# Patient Record
Sex: FEMALE | Race: WHITE | Employment: FULL TIME | URBAN - METROPOLITAN AREA
[De-identification: names, ages, dates, MRNs, and addresses within clinical notes are randomized per-mention and may not be internally consistent; named-entity substitution may affect disease eponyms.]

---

## 2018-09-18 ENCOUNTER — HOSPITAL ENCOUNTER (OUTPATIENT)
Dept: CT IMAGING | Age: 43
Discharge: HOME | End: 2018-09-20
Payer: COMMERCIAL

## 2018-09-18 DIAGNOSIS — R10.9 ACUTE ABDOMINAL PAIN: ICD-10-CM

## 2018-09-18 PROCEDURE — 2580000003 HC RX 258: Performed by: INTERNAL MEDICINE

## 2018-09-18 PROCEDURE — 74177 CT ABD & PELVIS W/CONTRAST: CPT

## 2018-09-18 PROCEDURE — 6360000004 HC RX CONTRAST MEDICATION: Performed by: INTERNAL MEDICINE

## 2018-09-18 RX ORDER — SODIUM CHLORIDE 0.9 % (FLUSH) 0.9 %
10 SYRINGE (ML) INJECTION
Status: COMPLETED | OUTPATIENT
Start: 2018-09-18 | End: 2018-09-18

## 2018-09-18 RX ORDER — 0.9 % SODIUM CHLORIDE 0.9 %
80 INTRAVENOUS SOLUTION INTRAVENOUS ONCE
Status: COMPLETED | OUTPATIENT
Start: 2018-09-18 | End: 2018-09-18

## 2018-09-18 RX ADMIN — IOPAMIDOL 75 ML: 755 INJECTION, SOLUTION INTRAVENOUS at 17:27

## 2018-09-18 RX ADMIN — SODIUM CHLORIDE 80 ML: 9 INJECTION, SOLUTION INTRAVENOUS at 17:26

## 2018-09-18 RX ADMIN — IOHEXOL 50 ML: 240 INJECTION, SOLUTION INTRATHECAL; INTRAVASCULAR; INTRAVENOUS; ORAL at 17:27

## 2018-09-18 RX ADMIN — Medication 10 ML: at 17:27

## 2021-08-02 ENCOUNTER — HOSPITAL ENCOUNTER (OUTPATIENT)
Dept: PREADMISSION TESTING | Age: 46
Discharge: HOME | End: 2021-08-06
Payer: COMMERCIAL

## 2021-08-02 VITALS
BODY MASS INDEX: 37.54 KG/M2 | TEMPERATURE: 98.1 F | SYSTOLIC BLOOD PRESSURE: 128 MMHG | DIASTOLIC BLOOD PRESSURE: 85 MMHG | OXYGEN SATURATION: 100 % | HEART RATE: 99 BPM | RESPIRATION RATE: 16 BRPM | HEIGHT: 68 IN | WEIGHT: 247.7 LBS

## 2021-08-02 LAB
-: ABNORMAL
ABSOLUTE EOS #: 1.51 K/UL (ref 0–0.4)
ABSOLUTE IMMATURE GRANULOCYTE: 0 K/UL (ref 0–0.3)
ABSOLUTE LYMPH #: 0.63 K/UL (ref 1–4.8)
ABSOLUTE MONO #: 0.44 K/UL (ref 0.2–0.8)
AMORPHOUS: ABNORMAL
ANION GAP SERPL CALCULATED.3IONS-SCNC: 13 MMOL/L (ref 9–17)
BACTERIA: ABNORMAL
BASOPHILS # BLD: 0 %
BASOPHILS ABSOLUTE: 0 K/UL (ref 0–0.2)
BILIRUBIN URINE: NEGATIVE
BUN BLDV-MCNC: 15 MG/DL (ref 6–20)
BUN/CREAT BLD: 18 (ref 9–20)
CALCIUM SERPL-MCNC: 9.2 MG/DL (ref 8.6–10.4)
CASTS UA: ABNORMAL /LPF
CHLORIDE BLD-SCNC: 104 MMOL/L (ref 98–107)
CO2: 20 MMOL/L (ref 20–31)
COLOR: YELLOW
COMMENT UA: ABNORMAL
CREAT SERPL-MCNC: 0.82 MG/DL (ref 0.5–0.9)
CRYSTALS, UA: ABNORMAL /HPF
DIFFERENTIAL TYPE: ABNORMAL
EOSINOPHILS RELATIVE PERCENT: 24 % (ref 1–4)
EPITHELIAL CELLS UA: ABNORMAL /HPF (ref 0–5)
GFR AFRICAN AMERICAN: >60 ML/MIN
GFR NON-AFRICAN AMERICAN: >60 ML/MIN
GFR SERPL CREATININE-BSD FRML MDRD: ABNORMAL ML/MIN/{1.73_M2}
GFR SERPL CREATININE-BSD FRML MDRD: ABNORMAL ML/MIN/{1.73_M2}
GLUCOSE BLD-MCNC: 96 MG/DL (ref 70–99)
GLUCOSE URINE: NEGATIVE
HCT VFR BLD CALC: 45.1 % (ref 36.3–47.1)
HEMOGLOBIN: 15.1 G/DL (ref 11.9–15.1)
IMMATURE GRANULOCYTES: 0 %
INR BLD: 1
KETONES, URINE: NEGATIVE
LEUKOCYTE ESTERASE, URINE: NEGATIVE
LYMPHOCYTES # BLD: 10 % (ref 24–44)
MCH RBC QN AUTO: 31.5 PG (ref 25.2–33.5)
MCHC RBC AUTO-ENTMCNC: 33.5 G/DL (ref 28.4–34.8)
MCV RBC AUTO: 94.2 FL (ref 82.6–102.9)
MONOCYTES # BLD: 7 % (ref 1–7)
MUCUS: ABNORMAL
NITRITE, URINE: NEGATIVE
NRBC AUTOMATED: 0 PER 100 WBC
OTHER OBSERVATIONS UA: ABNORMAL
PARTIAL THROMBOPLASTIN TIME: 24.6 SEC (ref 23.9–33.8)
PDW BLD-RTO: 14.1 % (ref 11.8–14.4)
PH UA: 6 (ref 5–8)
PLATELET # BLD: 251 K/UL (ref 138–453)
PLATELET ESTIMATE: ABNORMAL
PMV BLD AUTO: 9.9 FL (ref 8.1–13.5)
POTASSIUM SERPL-SCNC: 3.5 MMOL/L (ref 3.7–5.3)
PROTEIN UA: ABNORMAL
PROTHROMBIN TIME: 13.5 SEC (ref 11.5–14.2)
RBC # BLD: 4.79 M/UL (ref 3.95–5.11)
RBC # BLD: ABNORMAL 10*6/UL
RBC UA: ABNORMAL /HPF (ref 0–2)
RENAL EPITHELIAL, UA: ABNORMAL /HPF
SEG NEUTROPHILS: 59 % (ref 36–66)
SEGMENTED NEUTROPHILS ABSOLUTE COUNT: 3.72 K/UL (ref 1.8–7.7)
SODIUM BLD-SCNC: 137 MMOL/L (ref 135–144)
SPECIFIC GRAVITY UA: 1.03 (ref 1–1.03)
TRICHOMONAS: ABNORMAL
TURBIDITY: CLEAR
URINE HGB: NEGATIVE
UROBILINOGEN, URINE: ABNORMAL
WBC # BLD: 6.3 K/UL (ref 3.5–11.3)
WBC # BLD: ABNORMAL 10*3/UL
WBC UA: ABNORMAL /HPF (ref 0–5)
YEAST: ABNORMAL

## 2021-08-02 PROCEDURE — 36415 COLL VENOUS BLD VENIPUNCTURE: CPT

## 2021-08-02 PROCEDURE — 85025 COMPLETE CBC W/AUTO DIFF WBC: CPT

## 2021-08-02 PROCEDURE — 87641 MR-STAPH DNA AMP PROBE: CPT

## 2021-08-02 PROCEDURE — 81001 URINALYSIS AUTO W/SCOPE: CPT

## 2021-08-02 PROCEDURE — 85610 PROTHROMBIN TIME: CPT

## 2021-08-02 PROCEDURE — 80048 BASIC METABOLIC PNL TOTAL CA: CPT

## 2021-08-02 PROCEDURE — 85730 THROMBOPLASTIN TIME PARTIAL: CPT

## 2021-08-02 RX ORDER — VENLAFAXINE 37.5 MG/1
37.5 TABLET ORAL 2 TIMES DAILY
COMMUNITY

## 2021-08-02 RX ORDER — TRAMADOL HYDROCHLORIDE 50 MG/1
50 TABLET ORAL EVERY 6 HOURS PRN
Status: ON HOLD | COMMUNITY
End: 2021-08-24 | Stop reason: HOSPADM

## 2021-08-02 RX ORDER — KETOROLAC TROMETHAMINE 10 MG/1
10 TABLET, FILM COATED ORAL EVERY 6 HOURS PRN
COMMUNITY
End: 2021-08-02

## 2021-08-02 RX ORDER — CLINDAMYCIN PHOSPHATE 900 MG/50ML
900 INJECTION INTRAVENOUS ONCE
Status: CANCELLED | OUTPATIENT
Start: 2021-08-23

## 2021-08-02 RX ORDER — ROPINIROLE 2 MG/1
2 TABLET, FILM COATED ORAL 2 TIMES DAILY
COMMUNITY

## 2021-08-02 RX ORDER — TOPIRAMATE 25 MG/1
25 TABLET ORAL 2 TIMES DAILY
COMMUNITY

## 2021-08-02 RX ORDER — DEXTROAMPHETAMINE SACCHARATE, AMPHETAMINE ASPARTATE, DEXTROAMPHETAMINE SULFATE AND AMPHETAMINE SULFATE 7.5; 7.5; 7.5; 7.5 MG/1; MG/1; MG/1; MG/1
30 TABLET ORAL DAILY
Status: ON HOLD | COMMUNITY
End: 2021-12-22

## 2021-08-02 ASSESSMENT — PAIN DESCRIPTION - DESCRIPTORS: DESCRIPTORS: CONSTANT

## 2021-08-02 ASSESSMENT — PAIN DESCRIPTION - ORIENTATION: ORIENTATION: LOWER

## 2021-08-02 ASSESSMENT — PAIN DESCRIPTION - LOCATION: LOCATION: BACK

## 2021-08-02 ASSESSMENT — PAIN DESCRIPTION - FREQUENCY: FREQUENCY: CONTINUOUS

## 2021-08-02 ASSESSMENT — PAIN SCALES - GENERAL: PAINLEVEL_OUTOF10: 5

## 2021-08-02 ASSESSMENT — PAIN DESCRIPTION - PAIN TYPE: TYPE: CHRONIC PAIN

## 2021-08-02 NOTE — H&P
History and Physical Service   [facility] [place] - INPATIENT    HISTORY AND PHYSICAL EXAMINATION            Date of Evaluation: 8/2/2021  Patient name:  Alona Stanley  MRN:   [id number]  YOB: 1975  PCP:    QIAN Hawkins CNP    History Obtained From:     Patient, Medical records    History of Present Illness: This is Alona Stanley a 40 y.o. female who presents for a pre-admission testing appointment for an upcoming T10-T11 posterior lateral fusion and instrumentation with neuromonitoring by Dr. Stefanie Purvis scheduled on 08/23/2021 at 0730 due to thoracic spine instability. The patient's chief complaint is continuous, non-radiating, 4-8/10 low back pain that has progressively worsened over the past year. The pain is aggravated by prolonged sitting or walking. The back pain is minimally relieved by taking tramadol and by doing stretches. Pt denies numbness, tingling, and weakness. S/p thoracic spine surgery in 2017. History of multiple sclerosis, unbalanced gait, and falls. Pt states she last fell 1 year ago. Functional Capacity per pt:   1) Pt is able to walk 2 city blocks on level ground without SOB. 2) Pt is able to climb 2 flights of stairs without SOB. 3) Pt is able to walk up a hill for 1-2 city blocks without SOB.     Past Medical History:     Past Medical History:   Diagnosis Date    Balance problem     COVID-19 virus infection 02/2021    Dizziness     History of depression     Hypothyroidism     Migraines     Multiple sclerosis ([facility] [address]     Restless leg syndrome     Sleep apnea     patient does not wear CPAP        Past Surgical History:     Past Surgical History:   Procedure Laterality Date    CARPAL TUNNEL RELEASE Left 10-    CARPAL TUNNEL RELEASE Right 10-    CHOLECYSTECTOMY      HYSTERECTOMY      THORACIC SPINE SURGERY      VARICOSE VEIN SURGERY  2001        Medications Prior to Admission:     Prior to Admission medications    Medication Sig Start Date End Date Taking? Authorizing Provider   amphetamine-dextroamphetamine (ADDERALL) 30 MG tablet Take 30 mg by mouth daily. Yes Historical Provider, MD   venlafaxine (EFFEXOR) 37.5 MG tablet Take 37.5 mg by mouth 2 times daily   Yes Historical Provider, MD   topiramate (TOPAMAX) 100 MG tablet Take 100 mg by mouth 2 times daily   Yes Historical Provider, MD   rOPINIRole (REQUIP) 2 MG tablet Take 2 mg by mouth 2 times daily   Yes Historical Provider, MD   Amivantamab-vmjw 350 MG/7ML SOLN Infuse 7 mLs intravenously every 30 days   Yes Historical Provider, MD   Cladribine (MAVENCLAD, 5 TABS, PO) Take 5 tablets by mouth See Admin Instructions Patient takes 5 tabs twice yearly   Yes Historical Provider, MD   traMADol (ULTRAM) 50 MG tablet Take 50 mg by mouth every 6 hours as needed for Pain. Yes Historical Provider, MD   levothyroxine (SYNTHROID) 25 MCG tablet Take 25 mcg by mouth Daily  8/26/14   Historical Provider, MD   vitamin B-12 (CYANOCOBALAMIN) 100 MCG tablet Take 100 mcg by mouth daily     Historical Provider, MD        Allergies:     Pcn [penicillins]    Social History:     Tobacco:    reports that she has never smoked. She has never used smokeless tobacco.  Alcohol:      reports no history of alcohol use. Drug Use:  reports no history of drug use. Family History:     History reviewed. No pertinent family history. Review of Systems:     Positive and Negative as described in HPI. CONSTITUTIONAL: Fatigue. Pt takes Adderall for \"energy\". Negative for fevers, chills, sweats, and weight loss. HEENT: Pt wears glasses. Negative for hearing changes, rhinorrhea, and throat pain. RESPIRATORY: Sleep apnea. Negative for shortness of breath, cough, congestion, and wheezing. CARDIOVASCULAR: Negative for chest pain, blood clot, irregular heartbeat, and palpitations. GASTROINTESTINAL: Chronic nausea due to dizziness and medication. Pt vomits approximately once per week.  Negative for reflux, diarrhea, constipation, change in bowel habits, and abdominal pain. GENITOURINARY: Negative for difficulty of urination, burning with urination, and frequency. INTEGUMENT: Negative for rash, skin lesions, and easy bruising. HEMATOLOGIC/LYMPHATIC: Left foot edema. S/p left lower extremity varicose vein surgery in 2001. ALLERGIC/IMMUNOLOGIC: Negative for urticaria and itching. ENDOCRINE: Heat intolerance. Hypothyroidism. Negative for increase in thirst, increase in urination, and cold intolerance. MUSCULOSKELETAL: See HPI. NEUROLOGICAL: Dizziness due to multiple sclerosis. Migraines. Pt follows-up with neurology at the [facility]. Negative for lightheadedness, numbness, and tingling extremities. Pt denies history of strokes, head injuries, and seizures. BEHAVIOR/PSYCH: Treated depression. Negative for anxiety. Physical Exam:   /85   Pulse 99   Temp 98.1 °F (36.7 °C)   Resp 16   Ht 5' 8\" (1.727 m)   Wt 247 lb 11.2 oz (112.4 kg)   LMP 10/16/2005   SpO2 100%   BMI 37.66 kg/m²   Patient's last menstrual period was 10/16/2005. No obstetric history on file. No results for input(s): POCGLU in the last 72 hours. General Appearance:  Alert, well appearing, and in no acute distress. Mental status: Oriented to person, place, and time. Head: Normocephalic and atraumatic. Eye: Pt is wearing glasses. No icterus, redness, pupils equal and reactive, extraocular eye movements intact, and conjunctiva clear. Ear: Hearing grossly intact. Nose: No drainage noted. Mouth: Mucous membranes moist.  Neck: Supple and no carotid bruits noted. Lungs: Bilateral equal air entry, clear to auscultation, no wheezing, rales or rhonchi, and normal effort. Cardiovascular: Normal rate, regular rhythm, no murmur, gallop, or rub. Abdomen: Soft, nontender, nondistended, and active bowel sounds. Neurologic: Normal speech and cranial nerves II through XII grossly intact. Strength 5/5 bilaterally.   Skin: No gross lesions, rashes, bruising, or bleeding on exposed skin area. Extremities: Mild left pedal edema. No right pedal edema. Posterior tibial pulses 2+ bilaterally. No calf tenderness with palpation. Psych: Normal affect.      Investigations:      Laboratory Testing:  Recent Results (from the past 24 hour(s))   CBC Auto Differential    Collection Time: 08/02/21  2:28 PM   Result Value Ref Range    WBC 6.3 3.5 - 11.3 k/uL    RBC 4.79 3.95 - 5.11 m/uL    Hemoglobin 15.1 11.9 - 15.1 g/dL    Hematocrit 45.1 36.3 - 47.1 %    MCV 94.2 82.6 - 102.9 fL    MCH 31.5 25.2 - 33.5 pg    MCHC 33.5 28.4 - 34.8 g/dL    RDW 14.1 11.8 - 14.4 %    Platelets 842 370 - 234 k/uL    MPV 9.9 8.1 - 13.5 fL    NRBC Automated 0.0 0.0 per 100 WBC    Differential Type NOT REPORTED     Seg Neutrophils PENDING %    Lymphocytes PENDING %    Monocytes PENDING %    Eosinophils % PENDING %    Basophils PENDING %    Immature Granulocytes PENDING 0 %    Segs Absolute PENDING k/uL    Absolute Lymph # PENDING k/uL    Absolute Mono # PENDING k/uL    Absolute Eos # PENDING k/uL    Basophils Absolute PENDING 0.0 - 0.2 k/uL    Absolute Immature Granulocyte PENDING 0.00 - 0.30 k/uL    WBC Morphology NOT REPORTED     RBC Morphology NOT REPORTED     Platelet Estimate NOT REPORTED    Protime-INR    Collection Time: 08/02/21  2:28 PM   Result Value Ref Range    Protime 13.5 11.5 - 14.2 sec    INR 1.0    APTT    Collection Time: 08/02/21  2:28 PM   Result Value Ref Range    PTT 24.6 23.9 - 33.8 sec   Basic Metabolic Panel    Collection Time: 08/02/21  2:28 PM   Result Value Ref Range    Glucose 96 70 - 99 mg/dL    BUN 15 6 - 20 mg/dL    CREATININE 0.82 0.50 - 0.90 mg/dL    Bun/Cre Ratio 18 9 - 20    Calcium 9.2 8.6 - 10.4 mg/dL    Sodium 137 135 - 144 mmol/L    Potassium 3.5 (L) 3.7 - 5.3 mmol/L    Chloride 104 98 - 107 mmol/L    CO2 20 20 - 31 mmol/L    Anion Gap 13 9 - 17 mmol/L    GFR Non-African American >60 >60 mL/min    GFR African American >60 >60 mL/min    GFR Comment GFR Staging NOT REPORTED        Recent Labs     08/02/21  1428   HGB 15.1   HCT 45.1   WBC 6.3   MCV 94.2      K 3.5*      CO2 20   BUN 15   CREATININE 0.82   GLUCOSE 96   INR 1.0   PROTIME 13.5   APTT 24.6       No results for input(s): COVID19 in the last 720 hours. Diagnosis:      1.  Thoracic spine instability    Plans:     1. T10-T11 posterior lateral fusion and instrumentation with neuromonitoring       QIAN Ramirez - CNP  8/2/2021  3:00 PM

## 2021-08-02 NOTE — H&P (VIEW-ONLY)
History and Physical Service   [facility] [place] - INPATIENT    HISTORY AND PHYSICAL EXAMINATION            Date of Evaluation: 8/2/2021  Patient name:  Lalo Kent  MRN:   [id number]  YOB: 1975  PCP:    QIAN Shukla CNP    History Obtained From:     Patient, Medical records    History of Present Illness: This is Lalo Kent a 40 y.o. female who presents for a pre-admission testing appointment for an upcoming T10-T11 posterior lateral fusion and instrumentation with neuromonitoring by Dr. Ayanna Fernandez scheduled on 08/23/2021 at 0730 due to thoracic spine instability. The patient's chief complaint is continuous, non-radiating, 4-8/10 low back pain that has progressively worsened over the past year. The pain is aggravated by prolonged sitting or walking. The back pain is minimally relieved by taking tramadol and by doing stretches. Pt denies numbness, tingling, and weakness. S/p thoracic spine surgery in 2017. History of multiple sclerosis, unbalanced gait, and falls. Pt states she last fell 1 year ago. Functional Capacity per pt:   1) Pt is able to walk 2 city blocks on level ground without SOB. 2) Pt is able to climb 2 flights of stairs without SOB. 3) Pt is able to walk up a hill for 1-2 city blocks without SOB.     Past Medical History:     Past Medical History:   Diagnosis Date    Balance problem     COVID-19 virus infection 02/2021    Dizziness     History of depression     Hypothyroidism     Migraines     Multiple sclerosis ([facility] [address]     Restless leg syndrome     Sleep apnea     patient does not wear CPAP        Past Surgical History:     Past Surgical History:   Procedure Laterality Date    CARPAL TUNNEL RELEASE Left 10-    CARPAL TUNNEL RELEASE Right 10-    CHOLECYSTECTOMY      HYSTERECTOMY      THORACIC SPINE SURGERY      VARICOSE VEIN SURGERY  2001        Medications Prior to Admission:     Prior to Admission medications    Medication Sig Start Date End Date Taking? Authorizing Provider   amphetamine-dextroamphetamine (ADDERALL) 30 MG tablet Take 30 mg by mouth daily. Yes Historical Provider, MD   venlafaxine (EFFEXOR) 37.5 MG tablet Take 37.5 mg by mouth 2 times daily   Yes Historical Provider, MD   topiramate (TOPAMAX) 100 MG tablet Take 100 mg by mouth 2 times daily   Yes Historical Provider, MD   rOPINIRole (REQUIP) 2 MG tablet Take 2 mg by mouth 2 times daily   Yes Historical Provider, MD   Amivantamab-vmjw 350 MG/7ML SOLN Infuse 7 mLs intravenously every 30 days   Yes Historical Provider, MD   Cladribine (MAVENCLAD, 5 TABS, PO) Take 5 tablets by mouth See Admin Instructions Patient takes 5 tabs twice yearly   Yes Historical Provider, MD   traMADol (ULTRAM) 50 MG tablet Take 50 mg by mouth every 6 hours as needed for Pain. Yes Historical Provider, MD   levothyroxine (SYNTHROID) 25 MCG tablet Take 25 mcg by mouth Daily  8/26/14   Historical Provider, MD   vitamin B-12 (CYANOCOBALAMIN) 100 MCG tablet Take 100 mcg by mouth daily     Historical Provider, MD        Allergies:     Pcn [penicillins]    Social History:     Tobacco:    reports that she has never smoked. She has never used smokeless tobacco.  Alcohol:      reports no history of alcohol use. Drug Use:  reports no history of drug use. Family History:     History reviewed. No pertinent family history. Review of Systems:     Positive and Negative as described in HPI. CONSTITUTIONAL: Fatigue. Pt takes Adderall for \"energy\". Negative for fevers, chills, sweats, and weight loss. HEENT: Pt wears glasses. Negative for hearing changes, rhinorrhea, and throat pain. RESPIRATORY: Sleep apnea. Negative for shortness of breath, cough, congestion, and wheezing. CARDIOVASCULAR: Negative for chest pain, blood clot, irregular heartbeat, and palpitations. GASTROINTESTINAL: Chronic nausea due to dizziness and medication. Pt vomits approximately once per week.  Negative for reflux, diarrhea, constipation, change in bowel habits, and abdominal pain. GENITOURINARY: Negative for difficulty of urination, burning with urination, and frequency. INTEGUMENT: Negative for rash, skin lesions, and easy bruising. HEMATOLOGIC/LYMPHATIC: Left foot edema. S/p left lower extremity varicose vein surgery in 2001. ALLERGIC/IMMUNOLOGIC: Negative for urticaria and itching. ENDOCRINE: Heat intolerance. Hypothyroidism. Negative for increase in thirst, increase in urination, and cold intolerance. MUSCULOSKELETAL: See HPI. NEUROLOGICAL: Dizziness due to multiple sclerosis. Migraines. Pt follows-up with [facility]y at the [facility]. Negative for lightheadedness, numbness, and tingling extremities. Pt denies history of strokes, head injuries, and seizures. BEHAVIOR/PSYCH: Treated depression. Negative for anxiety. Physical Exam:   /85   Pulse 99   Temp 98.1 °F (36.7 °C)   Resp 16   Ht 5' 8\" (1.727 m)   Wt 247 lb 11.2 oz (112.4 kg)   LMP 10/16/2005   SpO2 100%   BMI 37.66 kg/m²   Patient's last menstrual period was 10/16/2005. No obstetric history on file. No results for input(s): POCGLU in the last 72 hours. General Appearance:  Alert, well appearing, and in no acute distress. Mental status: Oriented to person, place, and time. Head: Normocephalic and atraumatic. Eye: Pt is wearing glasses. No icterus, redness, pupils equal and reactive, extraocular eye movements intact, and conjunctiva clear. Ear: Hearing grossly intact. Nose: No drainage noted. Mouth: Mucous membranes moist.  Neck: Supple and no carotid bruits noted. Lungs: Bilateral equal air entry, clear to auscultation, no wheezing, rales or rhonchi, and normal effort. Cardiovascular: Normal rate, regular rhythm, no murmur, gallop, or rub. Abdomen: Soft, nontender, nondistended, and active bowel sounds. Neurologic: Normal speech and cranial nerves II through XII grossly intact. Strength 5/5 bilaterally.   Skin: No gross lesions, rashes, bruising, or bleeding on exposed skin area. Extremities: Mild left pedal edema. No right pedal edema. Posterior tibial pulses 2+ bilaterally. No calf tenderness with palpation. Psych: Normal affect.      Investigations:      Laboratory Testing:  Recent Results (from the past 24 hour(s))   CBC Auto Differential    Collection Time: 08/02/21  2:28 PM   Result Value Ref Range    WBC 6.3 3.5 - 11.3 k/uL    RBC 4.79 3.95 - 5.11 m/uL    Hemoglobin 15.1 11.9 - 15.1 g/dL    Hematocrit 45.1 36.3 - 47.1 %    MCV 94.2 82.6 - 102.9 fL    MCH 31.5 25.2 - 33.5 pg    MCHC 33.5 28.4 - 34.8 g/dL    RDW 14.1 11.8 - 14.4 %    Platelets 273 376 - 559 k/uL    MPV 9.9 8.1 - 13.5 fL    NRBC Automated 0.0 0.0 per 100 WBC    Differential Type NOT REPORTED     Seg Neutrophils PENDING %    Lymphocytes PENDING %    Monocytes PENDING %    Eosinophils % PENDING %    Basophils PENDING %    Immature Granulocytes PENDING 0 %    Segs Absolute PENDING k/uL    Absolute Lymph # PENDING k/uL    Absolute Mono # PENDING k/uL    Absolute Eos # PENDING k/uL    Basophils Absolute PENDING 0.0 - 0.2 k/uL    Absolute Immature Granulocyte PENDING 0.00 - 0.30 k/uL    WBC Morphology NOT REPORTED     RBC Morphology NOT REPORTED     Platelet Estimate NOT REPORTED    Protime-INR    Collection Time: 08/02/21  2:28 PM   Result Value Ref Range    Protime 13.5 11.5 - 14.2 sec    INR 1.0    APTT    Collection Time: 08/02/21  2:28 PM   Result Value Ref Range    PTT 24.6 23.9 - 33.8 sec   Basic Metabolic Panel    Collection Time: 08/02/21  2:28 PM   Result Value Ref Range    Glucose 96 70 - 99 mg/dL    BUN 15 6 - 20 mg/dL    CREATININE 0.82 0.50 - 0.90 mg/dL    Bun/Cre Ratio 18 9 - 20    Calcium 9.2 8.6 - 10.4 mg/dL    Sodium 137 135 - 144 mmol/L    Potassium 3.5 (L) 3.7 - 5.3 mmol/L    Chloride 104 98 - 107 mmol/L    CO2 20 20 - 31 mmol/L    Anion Gap 13 9 - 17 mmol/L    GFR Non-African American >60 >60 mL/min    GFR African American >60 >60 mL/min    GFR Comment GFR Staging NOT REPORTED        Recent Labs     08/02/21  1428   HGB 15.1   HCT 45.1   WBC 6.3   MCV 94.2      K 3.5*      CO2 20   BUN 15   CREATININE 0.82   GLUCOSE 96   INR 1.0   PROTIME 13.5   APTT 24.6       No results for input(s): COVID19 in the last 720 hours. Diagnosis:      1.  Thoracic spine instability    Plans:     1. T10-T11 posterior lateral fusion and instrumentation with neuromonitoring       QIAN Whyte - CNP  8/2/2021  3:00 PM

## 2021-08-02 NOTE — PROGRESS NOTES
ARRIVE AT [facility] ON Monday, 8/23 at 0545 AM    Once you enter the hospital lobby, take the elevators to the second floor. Check-In is at the surgery registration desk. Continue to take your home medications as you normally do up to and including the night before surgery with the exception of any blood thinning medications. Please stop any blood thinning medications as directed by your surgeon or prescribing physician. Failure to stop certain medications may interfere with your scheduled surgery. These may include:  Aspirin, Warfarin (Coumadin), Clopidogrel (Plavix), Ibuprofen (Motrin, Advil), Naproxen (Aleve), Meloxicam (Mobic), Celecoxib (Celebrex), Eliquis, Pradaxa, Xarelto, Effient, Fish Oil, Herbal supplements. Please take the following medication(s) the day of surgery with a small sip of water:  Synthroid and Venlafaxine      PREPARING FOR YOUR SURGERY:     Before surgery, you can play an important role in your own health. Because skin is not sterile, we need to be sure that your skin is as free of germs as possible before surgery by carefully washing before surgery. Preparing or prepping skin before surgery can reduce the risk of a surgical site infection.   Do not shave the area of your body where your surgery will be performed unless you received specific permission from your physician. You will need to shower at home the night before surgery and the morning of surgery with a special soap called chlorhexidine gluconate (CHG*). *Not to be used by people allergic to Chlorhexidine Gluconate (CHG). Following these instructions will help you be sure that your skin is clean before surgery. Instructions on cleaning your skin before surgery: The night before your surgery:      You will need to shower with warm water (not hot) and the CHG soap.  Use a clean wash cloth and a clean towel. Have clean clothes available to put on after the shower.         First wash the day of surgery. No nail polish on the operative extremity (arm/leg surgeries)    · Do not bring any valuables such as jewelry, cash, or credit cards. If you are staying overnight with us, please bring a small bag of personal items.  Please wear loose, comfortable clothing. If you are potentially going to have a cast or brace bring clothing that will fit over them.  In case of illness - If you have cold or flu like symptoms (high fever, runny nose, sore throat, cough, etc.) rash, nausea, vomiting, loose stools, and/or recent contact with someone who has a contagious disease (chicken pox, measles, etc.) Please call your doctor before coming to the hospital.         Day of Surgery/Procedure:    As a patient at [facility] you can expect quality medical and nursing care that is centered on your individual needs. Our goal is to make your surgical experience as comfortable as possible    . Transportation After Your Surgery/Procedure: You will need a friend or family member to drive you home after your procedure. Your  must be 25years of age or older and able to sign off on your discharge instructions. A taxi cab or any other form of public transportation is not acceptable. Your friend or family member must stay at the hospital throughout your procedure. Someone must remain with you for the first 24 hours after your surgery if you receive anesthesia or medication. If you do not have someone to stay with you, your procedure may be cancelled.       If you have any other questions regarding your procedure or the day of surgery, please call [phone]      _________________________  ____________________________  Signature (Patient)              Signature (Provider) & date

## 2021-08-03 LAB
MRSA, DNA, NASAL: NORMAL
SPECIMEN DESCRIPTION: NORMAL

## 2021-08-20 ENCOUNTER — ANESTHESIA EVENT (OUTPATIENT)
Dept: OPERATING ROOM | Age: 46
DRG: 460 | End: 2021-08-20
Payer: COMMERCIAL

## 2021-08-23 ENCOUNTER — ANESTHESIA (OUTPATIENT)
Dept: OPERATING ROOM | Age: 46
DRG: 460 | End: 2021-08-23
Payer: COMMERCIAL

## 2021-08-23 ENCOUNTER — APPOINTMENT (OUTPATIENT)
Dept: GENERAL RADIOLOGY | Age: 46
DRG: 460 | End: 2021-08-23
Attending: ORTHOPAEDIC SURGERY
Payer: COMMERCIAL

## 2021-08-23 ENCOUNTER — HOSPITAL ENCOUNTER (INPATIENT)
Age: 46
LOS: 1 days | Discharge: HOME | DRG: 460 | End: 2021-08-24
Attending: ORTHOPAEDIC SURGERY | Admitting: ORTHOPAEDIC SURGERY
Payer: COMMERCIAL

## 2021-08-23 VITALS — SYSTOLIC BLOOD PRESSURE: 98 MMHG | TEMPERATURE: 97 F | DIASTOLIC BLOOD PRESSURE: 55 MMHG | OXYGEN SATURATION: 92 %

## 2021-08-23 DIAGNOSIS — G89.29 CHRONIC BACK PAIN GREATER THAN 3 MONTHS DURATION: ICD-10-CM

## 2021-08-23 DIAGNOSIS — M53.2X4 SPINAL INSTABILITY OF THORACIC REGION: Primary | ICD-10-CM

## 2021-08-23 DIAGNOSIS — M54.9 CHRONIC BACK PAIN GREATER THAN 3 MONTHS DURATION: ICD-10-CM

## 2021-08-23 PROCEDURE — 2580000003 HC RX 258: Performed by: ANESTHESIOLOGY

## 2021-08-23 PROCEDURE — 2500000003 HC RX 250 WO HCPCS: Performed by: NURSE ANESTHETIST, CERTIFIED REGISTERED

## 2021-08-23 PROCEDURE — 2500000003 HC RX 250 WO HCPCS: Performed by: ORTHOPAEDIC SURGERY

## 2021-08-23 PROCEDURE — 2720000010 HC SURG SUPPLY STERILE: Performed by: ORTHOPAEDIC SURGERY

## 2021-08-23 PROCEDURE — 2709999900 HC NON-CHARGEABLE SUPPLY: Performed by: ORTHOPAEDIC SURGERY

## 2021-08-23 PROCEDURE — 6360000002 HC RX W HCPCS: Performed by: ORTHOPAEDIC SURGERY

## 2021-08-23 PROCEDURE — 3600000004 HC SURGERY LEVEL 4 BASE: Performed by: ORTHOPAEDIC SURGERY

## 2021-08-23 PROCEDURE — 2580000003 HC RX 258: Performed by: ORTHOPAEDIC SURGERY

## 2021-08-23 PROCEDURE — 97163 PT EVAL HIGH COMPLEX 45 MIN: CPT

## 2021-08-23 PROCEDURE — 6370000000 HC RX 637 (ALT 250 FOR IP): Performed by: ORTHOPAEDIC SURGERY

## 2021-08-23 PROCEDURE — 3209999900 FLUORO FOR SURGICAL PROCEDURES

## 2021-08-23 PROCEDURE — 6360000002 HC RX W HCPCS: Performed by: NURSE ANESTHETIST, CERTIFIED REGISTERED

## 2021-08-23 PROCEDURE — C1713 ANCHOR/SCREW BN/BN,TIS/BN: HCPCS | Performed by: ORTHOPAEDIC SURGERY

## 2021-08-23 PROCEDURE — 2780000010 HC IMPLANT OTHER: Performed by: ORTHOPAEDIC SURGERY

## 2021-08-23 PROCEDURE — 7100000001 HC PACU RECOVERY - ADDTL 15 MIN: Performed by: ORTHOPAEDIC SURGERY

## 2021-08-23 PROCEDURE — C9359 IMPLNT,BON VOID FILLER-PUTTY: HCPCS | Performed by: ORTHOPAEDIC SURGERY

## 2021-08-23 PROCEDURE — 1200000000 HC SEMI PRIVATE

## 2021-08-23 PROCEDURE — 0RG6071 FUSION OF THORACIC VERTEBRAL JOINT WITH AUTOLOGOUS TISSUE SUBSTITUTE, POSTERIOR APPROACH, POSTERIOR COLUMN, OPEN APPROACH: ICD-10-PCS | Performed by: ORTHOPAEDIC SURGERY

## 2021-08-23 PROCEDURE — 97116 GAIT TRAINING THERAPY: CPT

## 2021-08-23 PROCEDURE — 6370000000 HC RX 637 (ALT 250 FOR IP): Performed by: ANESTHESIOLOGY

## 2021-08-23 PROCEDURE — 3700000001 HC ADD 15 MINUTES (ANESTHESIA): Performed by: ORTHOPAEDIC SURGERY

## 2021-08-23 PROCEDURE — 6360000002 HC RX W HCPCS: Performed by: ANESTHESIOLOGY

## 2021-08-23 PROCEDURE — 7100000000 HC PACU RECOVERY - FIRST 15 MIN: Performed by: ORTHOPAEDIC SURGERY

## 2021-08-23 PROCEDURE — 87086 URINE CULTURE/COLONY COUNT: CPT

## 2021-08-23 PROCEDURE — 3700000000 HC ANESTHESIA ATTENDED CARE: Performed by: ORTHOPAEDIC SURGERY

## 2021-08-23 PROCEDURE — 3600000014 HC SURGERY LEVEL 4 ADDTL 15MIN: Performed by: ORTHOPAEDIC SURGERY

## 2021-08-23 PROCEDURE — 97530 THERAPEUTIC ACTIVITIES: CPT

## 2021-08-23 DEVICE — SCREW SPNL DIA5.5MM OPN TULIP LOK RELINE: Type: IMPLANTABLE DEVICE | Site: BACK | Status: FUNCTIONAL

## 2021-08-23 DEVICE — DBM 005005 PROGENIX DBM 5 CC SRVC FEE
Type: IMPLANTABLE DEVICE | Site: BACK | Status: FUNCTIONAL
Brand: PROGENIX® PUTTY AND PROGENIX® PLUS

## 2021-08-23 DEVICE — SCREW SPNL MOD TULIP RELINE: Type: IMPLANTABLE DEVICE | Site: BACK | Status: FUNCTIONAL

## 2021-08-23 DEVICE — IMPLANTABLE DEVICE: Type: IMPLANTABLE DEVICE | Site: BACK | Status: FUNCTIONAL

## 2021-08-23 DEVICE — ROD SPNL L40MM DIA5.5MM POST THORACOLUMBOSACRAL TI LORD: Type: IMPLANTABLE DEVICE | Site: BACK | Status: FUNCTIONAL

## 2021-08-23 DEVICE — GRAFT BONE SUB 30CC L1-10MM CANC CRUSH FRZ DRY: Type: IMPLANTABLE DEVICE | Site: BACK | Status: FUNCTIONAL

## 2021-08-23 RX ORDER — SODIUM CHLORIDE 0.9 % (FLUSH) 0.9 %
10 SYRINGE (ML) INJECTION PRN
Status: DISCONTINUED | OUTPATIENT
Start: 2021-08-23 | End: 2021-08-23 | Stop reason: HOSPADM

## 2021-08-23 RX ORDER — FENTANYL CITRATE 50 UG/ML
25 INJECTION, SOLUTION INTRAMUSCULAR; INTRAVENOUS EVERY 5 MIN PRN
Status: DISCONTINUED | OUTPATIENT
Start: 2021-08-23 | End: 2021-08-23 | Stop reason: HOSPADM

## 2021-08-23 RX ORDER — BUPIVACAINE HYDROCHLORIDE AND EPINEPHRINE 5; 5 MG/ML; UG/ML
INJECTION, SOLUTION EPIDURAL; INTRACAUDAL; PERINEURAL PRN
Status: DISCONTINUED | OUTPATIENT
Start: 2021-08-23 | End: 2021-08-23 | Stop reason: HOSPADM

## 2021-08-23 RX ORDER — TOPIRAMATE 100 MG/1
100 TABLET, FILM COATED ORAL 2 TIMES DAILY
Status: DISCONTINUED | OUTPATIENT
Start: 2021-08-23 | End: 2021-08-24 | Stop reason: HOSPADM

## 2021-08-23 RX ORDER — HYDROMORPHONE HYDROCHLORIDE 1 MG/ML
0.25 INJECTION, SOLUTION INTRAMUSCULAR; INTRAVENOUS; SUBCUTANEOUS EVERY 5 MIN PRN
Status: COMPLETED | OUTPATIENT
Start: 2021-08-23 | End: 2021-08-23

## 2021-08-23 RX ORDER — MIDAZOLAM HYDROCHLORIDE 1 MG/ML
INJECTION INTRAMUSCULAR; INTRAVENOUS PRN
Status: DISCONTINUED | OUTPATIENT
Start: 2021-08-23 | End: 2021-08-23 | Stop reason: SDUPTHER

## 2021-08-23 RX ORDER — SENNA AND DOCUSATE SODIUM 50; 8.6 MG/1; MG/1
1 TABLET, FILM COATED ORAL 2 TIMES DAILY
Status: DISCONTINUED | OUTPATIENT
Start: 2021-08-23 | End: 2021-08-24 | Stop reason: HOSPADM

## 2021-08-23 RX ORDER — TIZANIDINE 4 MG/1
4 TABLET ORAL ONCE
Status: COMPLETED | OUTPATIENT
Start: 2021-08-23 | End: 2021-08-23

## 2021-08-23 RX ORDER — SODIUM CHLORIDE 0.9 % (FLUSH) 0.9 %
10 SYRINGE (ML) INJECTION EVERY 12 HOURS SCHEDULED
Status: DISCONTINUED | OUTPATIENT
Start: 2021-08-23 | End: 2021-08-24 | Stop reason: HOSPADM

## 2021-08-23 RX ORDER — ONDANSETRON 2 MG/ML
INJECTION INTRAMUSCULAR; INTRAVENOUS PRN
Status: DISCONTINUED | OUTPATIENT
Start: 2021-08-23 | End: 2021-08-23 | Stop reason: SDUPTHER

## 2021-08-23 RX ORDER — LIDOCAINE HYDROCHLORIDE 20 MG/ML
INJECTION, SOLUTION EPIDURAL; INFILTRATION; INTRACAUDAL; PERINEURAL PRN
Status: DISCONTINUED | OUTPATIENT
Start: 2021-08-23 | End: 2021-08-23 | Stop reason: SDUPTHER

## 2021-08-23 RX ORDER — SODIUM CHLORIDE, SODIUM LACTATE, POTASSIUM CHLORIDE, CALCIUM CHLORIDE 600; 310; 30; 20 MG/100ML; MG/100ML; MG/100ML; MG/100ML
INJECTION, SOLUTION INTRAVENOUS CONTINUOUS
Status: DISCONTINUED | OUTPATIENT
Start: 2021-08-24 | End: 2021-08-23

## 2021-08-23 RX ORDER — SODIUM CHLORIDE 0.9 % (FLUSH) 0.9 %
10 SYRINGE (ML) INJECTION EVERY 12 HOURS SCHEDULED
Status: DISCONTINUED | OUTPATIENT
Start: 2021-08-23 | End: 2021-08-23 | Stop reason: HOSPADM

## 2021-08-23 RX ORDER — OXYCODONE HCL 10 MG/1
10 TABLET, FILM COATED, EXTENDED RELEASE ORAL EVERY 12 HOURS SCHEDULED
Status: DISCONTINUED | OUTPATIENT
Start: 2021-08-23 | End: 2021-08-24 | Stop reason: HOSPADM

## 2021-08-23 RX ORDER — SODIUM CHLORIDE 9 MG/ML
INJECTION, SOLUTION INTRAVENOUS CONTINUOUS
Status: DISCONTINUED | OUTPATIENT
Start: 2021-08-24 | End: 2021-08-23

## 2021-08-23 RX ORDER — LEVOTHYROXINE SODIUM 0.03 MG/1
25 TABLET ORAL DAILY
Status: DISCONTINUED | OUTPATIENT
Start: 2021-08-24 | End: 2021-08-24 | Stop reason: HOSPADM

## 2021-08-23 RX ORDER — TIZANIDINE 4 MG/1
4 TABLET ORAL EVERY 8 HOURS PRN
Status: DISCONTINUED | OUTPATIENT
Start: 2021-08-23 | End: 2021-08-24 | Stop reason: HOSPADM

## 2021-08-23 RX ORDER — ONDANSETRON 2 MG/ML
4 INJECTION INTRAMUSCULAR; INTRAVENOUS EVERY 6 HOURS PRN
Status: DISCONTINUED | OUTPATIENT
Start: 2021-08-23 | End: 2021-08-24 | Stop reason: HOSPADM

## 2021-08-23 RX ORDER — CLINDAMYCIN PHOSPHATE 900 MG/50ML
900 INJECTION INTRAVENOUS ONCE
Status: COMPLETED | OUTPATIENT
Start: 2021-08-23 | End: 2021-08-23

## 2021-08-23 RX ORDER — VANCOMYCIN HYDROCHLORIDE 1 G/20ML
INJECTION, POWDER, LYOPHILIZED, FOR SOLUTION INTRAVENOUS PRN
Status: DISCONTINUED | OUTPATIENT
Start: 2021-08-23 | End: 2021-08-23 | Stop reason: HOSPADM

## 2021-08-23 RX ORDER — POLYETHYLENE GLYCOL 3350 17 G/17G
17 POWDER, FOR SOLUTION ORAL DAILY
Status: DISCONTINUED | OUTPATIENT
Start: 2021-08-23 | End: 2021-08-24 | Stop reason: HOSPADM

## 2021-08-23 RX ORDER — VENLAFAXINE 37.5 MG/1
37.5 TABLET ORAL 2 TIMES DAILY
Status: DISCONTINUED | OUTPATIENT
Start: 2021-08-23 | End: 2021-08-24 | Stop reason: HOSPADM

## 2021-08-23 RX ORDER — SODIUM CHLORIDE 9 MG/ML
INJECTION, SOLUTION INTRAVENOUS CONTINUOUS
Status: DISCONTINUED | OUTPATIENT
Start: 2021-08-23 | End: 2021-08-24 | Stop reason: HOSPADM

## 2021-08-23 RX ORDER — PROMETHAZINE HYDROCHLORIDE 12.5 MG/1
12.5 TABLET ORAL EVERY 6 HOURS PRN
Status: DISCONTINUED | OUTPATIENT
Start: 2021-08-23 | End: 2021-08-24 | Stop reason: HOSPADM

## 2021-08-23 RX ORDER — PHENYLEPHRINE HCL IN 0.9% NACL 1 MG/10 ML
SYRINGE (ML) INTRAVENOUS PRN
Status: DISCONTINUED | OUTPATIENT
Start: 2021-08-23 | End: 2021-08-23 | Stop reason: SDUPTHER

## 2021-08-23 RX ORDER — KETAMINE HCL IN NACL, ISO-OSM 100MG/10ML
SYRINGE (ML) INJECTION PRN
Status: DISCONTINUED | OUTPATIENT
Start: 2021-08-23 | End: 2021-08-23 | Stop reason: SDUPTHER

## 2021-08-23 RX ORDER — ONDANSETRON 2 MG/ML
4 INJECTION INTRAMUSCULAR; INTRAVENOUS
Status: DISCONTINUED | OUTPATIENT
Start: 2021-08-23 | End: 2021-08-23 | Stop reason: HOSPADM

## 2021-08-23 RX ORDER — SODIUM CHLORIDE 9 MG/ML
25 INJECTION, SOLUTION INTRAVENOUS PRN
Status: DISCONTINUED | OUTPATIENT
Start: 2021-08-23 | End: 2021-08-23 | Stop reason: HOSPADM

## 2021-08-23 RX ORDER — SUCCINYLCHOLINE/SOD CL,ISO/PF 100 MG/5ML
SYRINGE (ML) INTRAVENOUS PRN
Status: DISCONTINUED | OUTPATIENT
Start: 2021-08-23 | End: 2021-08-23 | Stop reason: SDUPTHER

## 2021-08-23 RX ORDER — PROPOFOL 10 MG/ML
INJECTION, EMULSION INTRAVENOUS CONTINUOUS PRN
Status: DISCONTINUED | OUTPATIENT
Start: 2021-08-23 | End: 2021-08-23 | Stop reason: SDUPTHER

## 2021-08-23 RX ORDER — LIDOCAINE HYDROCHLORIDE 10 MG/ML
1 INJECTION, SOLUTION EPIDURAL; INFILTRATION; INTRACAUDAL; PERINEURAL
Status: DISCONTINUED | OUTPATIENT
Start: 2021-08-24 | End: 2021-08-23 | Stop reason: HOSPADM

## 2021-08-23 RX ORDER — DEXAMETHASONE SODIUM PHOSPHATE 10 MG/ML
6 INJECTION, SOLUTION INTRAMUSCULAR; INTRAVENOUS EVERY 8 HOURS
Status: COMPLETED | OUTPATIENT
Start: 2021-08-23 | End: 2021-08-24

## 2021-08-23 RX ORDER — FENTANYL CITRATE 50 UG/ML
INJECTION, SOLUTION INTRAMUSCULAR; INTRAVENOUS PRN
Status: DISCONTINUED | OUTPATIENT
Start: 2021-08-23 | End: 2021-08-23 | Stop reason: SDUPTHER

## 2021-08-23 RX ORDER — DEXAMETHASONE SODIUM PHOSPHATE 10 MG/ML
INJECTION, SOLUTION INTRAMUSCULAR; INTRAVENOUS PRN
Status: DISCONTINUED | OUTPATIENT
Start: 2021-08-23 | End: 2021-08-23 | Stop reason: SDUPTHER

## 2021-08-23 RX ORDER — ROPINIROLE 1 MG/1
2 TABLET, FILM COATED ORAL 2 TIMES DAILY
Status: DISCONTINUED | OUTPATIENT
Start: 2021-08-23 | End: 2021-08-24 | Stop reason: HOSPADM

## 2021-08-23 RX ORDER — SODIUM CHLORIDE 0.9 % (FLUSH) 0.9 %
10 SYRINGE (ML) INJECTION PRN
Status: DISCONTINUED | OUTPATIENT
Start: 2021-08-23 | End: 2021-08-24 | Stop reason: HOSPADM

## 2021-08-23 RX ORDER — CLINDAMYCIN PHOSPHATE 900 MG/50ML
900 INJECTION INTRAVENOUS EVERY 8 HOURS
Status: COMPLETED | OUTPATIENT
Start: 2021-08-23 | End: 2021-08-24

## 2021-08-23 RX ORDER — DEXTROAMPHETAMINE SACCHARATE, AMPHETAMINE ASPARTATE MONOHYDRATE, DEXTROAMPHETAMINE SULFATE AND AMPHETAMINE SULFATE 7.5; 7.5; 7.5; 7.5 MG/1; MG/1; MG/1; MG/1
30 CAPSULE, EXTENDED RELEASE ORAL DAILY
Status: DISCONTINUED | OUTPATIENT
Start: 2021-08-23 | End: 2021-08-24 | Stop reason: HOSPADM

## 2021-08-23 RX ORDER — OXYCODONE HYDROCHLORIDE AND ACETAMINOPHEN 5; 325 MG/1; MG/1
2 TABLET ORAL EVERY 4 HOURS PRN
Status: DISCONTINUED | OUTPATIENT
Start: 2021-08-23 | End: 2021-08-24 | Stop reason: HOSPADM

## 2021-08-23 RX ORDER — OXYCODONE HYDROCHLORIDE AND ACETAMINOPHEN 5; 325 MG/1; MG/1
1 TABLET ORAL EVERY 4 HOURS PRN
Status: DISCONTINUED | OUTPATIENT
Start: 2021-08-23 | End: 2021-08-24 | Stop reason: HOSPADM

## 2021-08-23 RX ORDER — SCOLOPAMINE TRANSDERMAL SYSTEM 1 MG/1
1 PATCH, EXTENDED RELEASE TRANSDERMAL ONCE
Status: DISCONTINUED | OUTPATIENT
Start: 2021-08-23 | End: 2021-08-23

## 2021-08-23 RX ORDER — SODIUM CHLORIDE 9 MG/ML
25 INJECTION, SOLUTION INTRAVENOUS PRN
Status: DISCONTINUED | OUTPATIENT
Start: 2021-08-23 | End: 2021-08-24 | Stop reason: HOSPADM

## 2021-08-23 RX ORDER — HYDROMORPHONE HYDROCHLORIDE 1 MG/ML
0.25 INJECTION, SOLUTION INTRAMUSCULAR; INTRAVENOUS; SUBCUTANEOUS EVERY 5 MIN PRN
Status: DISCONTINUED | OUTPATIENT
Start: 2021-08-23 | End: 2021-08-23 | Stop reason: HOSPADM

## 2021-08-23 RX ADMIN — PROPOFOL 200 MG: 10 INJECTION, EMULSION INTRAVENOUS at 07:49

## 2021-08-23 RX ADMIN — ROPINIROLE HYDROCHLORIDE 2 MG: 1 TABLET, FILM COATED ORAL at 20:38

## 2021-08-23 RX ADMIN — SODIUM CHLORIDE, POTASSIUM CHLORIDE, SODIUM LACTATE AND CALCIUM CHLORIDE: 600; 310; 30; 20 INJECTION, SOLUTION INTRAVENOUS at 06:24

## 2021-08-23 RX ADMIN — HYDROMORPHONE HYDROCHLORIDE 0.25 MG: 1 INJECTION, SOLUTION INTRAMUSCULAR; INTRAVENOUS; SUBCUTANEOUS at 12:10

## 2021-08-23 RX ADMIN — DOCUSATE SODIUM 50MG AND SENNOSIDES 8.6MG 1 TABLET: 8.6; 5 TABLET, FILM COATED ORAL at 20:38

## 2021-08-23 RX ADMIN — PROPOFOL 30 MG: 10 INJECTION, EMULSION INTRAVENOUS at 08:05

## 2021-08-23 RX ADMIN — Medication 100 MCG: at 07:58

## 2021-08-23 RX ADMIN — Medication 100 MCG: at 09:25

## 2021-08-23 RX ADMIN — SODIUM CHLORIDE: 9 INJECTION, SOLUTION INTRAVENOUS at 13:48

## 2021-08-23 RX ADMIN — OXYCODONE AND ACETAMINOPHEN 2 TABLET: 5; 325 TABLET ORAL at 21:48

## 2021-08-23 RX ADMIN — PROPOFOL 100 MCG/KG/MIN: 10 INJECTION, EMULSION INTRAVENOUS at 08:15

## 2021-08-23 RX ADMIN — Medication 100 MCG: at 08:43

## 2021-08-23 RX ADMIN — CLINDAMYCIN PHOSPHATE 900 MG: 900 INJECTION, SOLUTION INTRAVENOUS at 07:57

## 2021-08-23 RX ADMIN — MIDAZOLAM 2 MG: 1 INJECTION INTRAMUSCULAR; INTRAVENOUS at 07:41

## 2021-08-23 RX ADMIN — Medication 100 MCG: at 08:13

## 2021-08-23 RX ADMIN — TIZANIDINE 4 MG: 4 TABLET ORAL at 06:44

## 2021-08-23 RX ADMIN — TOPIRAMATE 100 MG: 100 TABLET, FILM COATED ORAL at 13:41

## 2021-08-23 RX ADMIN — Medication 20 MG: at 09:35

## 2021-08-23 RX ADMIN — HYDROMORPHONE HYDROCHLORIDE 0.25 MG: 1 INJECTION, SOLUTION INTRAMUSCULAR; INTRAVENOUS; SUBCUTANEOUS at 11:34

## 2021-08-23 RX ADMIN — SODIUM CHLORIDE, POTASSIUM CHLORIDE, SODIUM LACTATE AND CALCIUM CHLORIDE: 600; 310; 30; 20 INJECTION, SOLUTION INTRAVENOUS at 09:58

## 2021-08-23 RX ADMIN — Medication 100 MG: at 07:50

## 2021-08-23 RX ADMIN — TOPIRAMATE 100 MG: 100 TABLET, FILM COATED ORAL at 20:38

## 2021-08-23 RX ADMIN — POLYETHYLENE GLYCOL 3350 17 G: 17 POWDER, FOR SOLUTION ORAL at 13:41

## 2021-08-23 RX ADMIN — BISACODYL 5 MG: 5 TABLET, COATED ORAL at 13:41

## 2021-08-23 RX ADMIN — FENTANYL CITRATE 25 MCG: 50 INJECTION, SOLUTION INTRAMUSCULAR; INTRAVENOUS at 10:02

## 2021-08-23 RX ADMIN — CLINDAMYCIN PHOSPHATE 900 MG: 900 INJECTION, SOLUTION INTRAVENOUS at 17:47

## 2021-08-23 RX ADMIN — Medication 30 MG: at 08:55

## 2021-08-23 RX ADMIN — SODIUM CHLORIDE: 9 INJECTION, SOLUTION INTRAVENOUS at 22:23

## 2021-08-23 RX ADMIN — CLINDAMYCIN PHOSPHATE 900 MG: 900 INJECTION, SOLUTION INTRAVENOUS at 23:58

## 2021-08-23 RX ADMIN — OXYCODONE AND ACETAMINOPHEN 2 TABLET: 5; 325 TABLET ORAL at 17:43

## 2021-08-23 RX ADMIN — HYDROMORPHONE HYDROCHLORIDE 0.5 MG: 1 INJECTION, SOLUTION INTRAMUSCULAR; INTRAVENOUS; SUBCUTANEOUS at 15:20

## 2021-08-23 RX ADMIN — HYDROMORPHONE HYDROCHLORIDE 0.25 MG: 1 INJECTION, SOLUTION INTRAMUSCULAR; INTRAVENOUS; SUBCUTANEOUS at 11:09

## 2021-08-23 RX ADMIN — DEXAMETHASONE SODIUM PHOSPHATE 10 MG: 10 INJECTION INTRAMUSCULAR; INTRAVENOUS at 08:18

## 2021-08-23 RX ADMIN — DEXAMETHASONE SODIUM PHOSPHATE 6 MG: 10 INJECTION, SOLUTION INTRAMUSCULAR; INTRAVENOUS at 13:42

## 2021-08-23 RX ADMIN — FENTANYL CITRATE 25 MCG: 50 INJECTION, SOLUTION INTRAMUSCULAR; INTRAVENOUS at 08:32

## 2021-08-23 RX ADMIN — FENTANYL CITRATE 25 MCG: 50 INJECTION, SOLUTION INTRAMUSCULAR; INTRAVENOUS at 09:05

## 2021-08-23 RX ADMIN — Medication 20 MG: at 07:49

## 2021-08-23 RX ADMIN — HYDROMORPHONE HYDROCHLORIDE 0.25 MG: 1 INJECTION, SOLUTION INTRAMUSCULAR; INTRAVENOUS; SUBCUTANEOUS at 11:25

## 2021-08-23 RX ADMIN — OXYCODONE AND ACETAMINOPHEN 2 TABLET: 5; 325 TABLET ORAL at 13:42

## 2021-08-23 RX ADMIN — VENLAFAXINE 37.5 MG: 37.5 TABLET ORAL at 20:39

## 2021-08-23 RX ADMIN — OXYCODONE HYDROCHLORIDE 10 MG: 10 TABLET, FILM COATED, EXTENDED RELEASE ORAL at 20:38

## 2021-08-23 RX ADMIN — HYDROMORPHONE HYDROCHLORIDE 0.25 MG: 1 INJECTION, SOLUTION INTRAMUSCULAR; INTRAVENOUS; SUBCUTANEOUS at 12:05

## 2021-08-23 RX ADMIN — FENTANYL CITRATE 100 MCG: 50 INJECTION, SOLUTION INTRAMUSCULAR; INTRAVENOUS at 07:47

## 2021-08-23 RX ADMIN — HYDROMORPHONE HYDROCHLORIDE 0.25 MG: 1 INJECTION, SOLUTION INTRAMUSCULAR; INTRAVENOUS; SUBCUTANEOUS at 11:40

## 2021-08-23 RX ADMIN — PROPOFOL 30 MG: 10 INJECTION, EMULSION INTRAVENOUS at 08:14

## 2021-08-23 RX ADMIN — Medication 100 MCG: at 08:35

## 2021-08-23 RX ADMIN — DEXAMETHASONE SODIUM PHOSPHATE 6 MG: 10 INJECTION, SOLUTION INTRAMUSCULAR; INTRAVENOUS at 20:38

## 2021-08-23 RX ADMIN — LIDOCAINE HYDROCHLORIDE 100 MG: 20 INJECTION, SOLUTION EPIDURAL; INFILTRATION; INTRACAUDAL; PERINEURAL at 07:49

## 2021-08-23 RX ADMIN — DOCUSATE SODIUM 50MG AND SENNOSIDES 8.6MG 1 TABLET: 8.6; 5 TABLET, FILM COATED ORAL at 13:41

## 2021-08-23 RX ADMIN — ONDANSETRON 4 MG: 2 INJECTION, SOLUTION INTRAMUSCULAR; INTRAVENOUS at 09:47

## 2021-08-23 ASSESSMENT — PULMONARY FUNCTION TESTS
PIF_VALUE: 23
PIF_VALUE: 30
PIF_VALUE: 24
PIF_VALUE: 22
PIF_VALUE: 4
PIF_VALUE: 23
PIF_VALUE: 27
PIF_VALUE: 26
PIF_VALUE: 21
PIF_VALUE: 23
PIF_VALUE: 27
PIF_VALUE: 26
PIF_VALUE: 4
PIF_VALUE: 25
PIF_VALUE: 25
PIF_VALUE: 27
PIF_VALUE: 25
PIF_VALUE: 30
PIF_VALUE: 28
PIF_VALUE: 1
PIF_VALUE: 23
PIF_VALUE: 27
PIF_VALUE: 31
PIF_VALUE: 25
PIF_VALUE: 32
PIF_VALUE: 25
PIF_VALUE: 25
PIF_VALUE: 24
PIF_VALUE: 30
PIF_VALUE: 21
PIF_VALUE: 27
PIF_VALUE: 27
PIF_VALUE: 25
PIF_VALUE: 23
PIF_VALUE: 12
PIF_VALUE: 29
PIF_VALUE: 21
PIF_VALUE: 27
PIF_VALUE: 24
PIF_VALUE: 21
PIF_VALUE: 30
PIF_VALUE: 25
PIF_VALUE: 24
PIF_VALUE: 27
PIF_VALUE: 24
PIF_VALUE: 29
PIF_VALUE: 23
PIF_VALUE: 27
PIF_VALUE: 26
PIF_VALUE: 22
PIF_VALUE: 24
PIF_VALUE: 28
PIF_VALUE: 6
PIF_VALUE: 3
PIF_VALUE: 26
PIF_VALUE: 27
PIF_VALUE: 2
PIF_VALUE: 22
PIF_VALUE: 23
PIF_VALUE: 24
PIF_VALUE: 27
PIF_VALUE: 23
PIF_VALUE: 24
PIF_VALUE: 29
PIF_VALUE: 4
PIF_VALUE: 27
PIF_VALUE: 30
PIF_VALUE: 26
PIF_VALUE: 23
PIF_VALUE: 24
PIF_VALUE: 23
PIF_VALUE: 0
PIF_VALUE: 21
PIF_VALUE: 25
PIF_VALUE: 4
PIF_VALUE: 26
PIF_VALUE: 27
PIF_VALUE: 26
PIF_VALUE: 28
PIF_VALUE: 29
PIF_VALUE: 1
PIF_VALUE: 27
PIF_VALUE: 1
PIF_VALUE: 21
PIF_VALUE: 22
PIF_VALUE: 27
PIF_VALUE: 25
PIF_VALUE: 26
PIF_VALUE: 1
PIF_VALUE: 25
PIF_VALUE: 30
PIF_VALUE: 26
PIF_VALUE: 26
PIF_VALUE: 20
PIF_VALUE: 1
PIF_VALUE: 23
PIF_VALUE: 24
PIF_VALUE: 24
PIF_VALUE: 35
PIF_VALUE: 16
PIF_VALUE: 4
PIF_VALUE: 30
PIF_VALUE: 26
PIF_VALUE: 20
PIF_VALUE: 27
PIF_VALUE: 30
PIF_VALUE: 27
PIF_VALUE: 33
PIF_VALUE: 24
PIF_VALUE: 21
PIF_VALUE: 27
PIF_VALUE: 4
PIF_VALUE: 21
PIF_VALUE: 19
PIF_VALUE: 25
PIF_VALUE: 27
PIF_VALUE: 24
PIF_VALUE: 27
PIF_VALUE: 25
PIF_VALUE: 21
PIF_VALUE: 25
PIF_VALUE: 27
PIF_VALUE: 28
PIF_VALUE: 27
PIF_VALUE: 25
PIF_VALUE: 20
PIF_VALUE: 26
PIF_VALUE: 27
PIF_VALUE: 25
PIF_VALUE: 28
PIF_VALUE: 29
PIF_VALUE: 24
PIF_VALUE: 15
PIF_VALUE: 21
PIF_VALUE: 0
PIF_VALUE: 23
PIF_VALUE: 24
PIF_VALUE: 29
PIF_VALUE: 26
PIF_VALUE: 24
PIF_VALUE: 4
PIF_VALUE: 25
PIF_VALUE: 23
PIF_VALUE: 25
PIF_VALUE: 22
PIF_VALUE: 25
PIF_VALUE: 24
PIF_VALUE: 21
PIF_VALUE: 25
PIF_VALUE: 27
PIF_VALUE: 1
PIF_VALUE: 25

## 2021-08-23 ASSESSMENT — PAIN DESCRIPTION - DESCRIPTORS
DESCRIPTORS: DISCOMFORT;CONSTANT
DESCRIPTORS: DISCOMFORT;CONSTANT
DESCRIPTORS: ACHING;CONSTANT

## 2021-08-23 ASSESSMENT — PAIN SCALES - GENERAL
PAINLEVEL_OUTOF10: 8
PAINLEVEL_OUTOF10: 8
PAINLEVEL_OUTOF10: 9
PAINLEVEL_OUTOF10: 9
PAINLEVEL_OUTOF10: 8
PAINLEVEL_OUTOF10: 7
PAINLEVEL_OUTOF10: 8
PAINLEVEL_OUTOF10: 9

## 2021-08-23 ASSESSMENT — PAIN DESCRIPTION - ORIENTATION
ORIENTATION: MID

## 2021-08-23 ASSESSMENT — PAIN DESCRIPTION - PAIN TYPE
TYPE: SURGICAL PAIN

## 2021-08-23 ASSESSMENT — PAIN DESCRIPTION - FREQUENCY
FREQUENCY: CONTINUOUS
FREQUENCY: CONTINUOUS

## 2021-08-23 ASSESSMENT — PAIN DESCRIPTION - LOCATION
LOCATION: BACK

## 2021-08-23 ASSESSMENT — PAIN DESCRIPTION - DIRECTION
RADIATING_TOWARDS: LOWER
RADIATING_TOWARDS: LOWER BACK

## 2021-08-23 ASSESSMENT — PAIN - FUNCTIONAL ASSESSMENT
PAIN_FUNCTIONAL_ASSESSMENT: PREVENTS OR INTERFERES SOME ACTIVE ACTIVITIES AND ADLS
PAIN_FUNCTIONAL_ASSESSMENT: PREVENTS OR INTERFERES SOME ACTIVE ACTIVITIES AND ADLS

## 2021-08-23 ASSESSMENT — PAIN DESCRIPTION - ONSET
ONSET: ON-GOING
ONSET: ON-GOING

## 2021-08-23 ASSESSMENT — PAIN DESCRIPTION - PROGRESSION
CLINICAL_PROGRESSION: GRADUALLY WORSENING
CLINICAL_PROGRESSION: NOT CHANGED

## 2021-08-23 NOTE — ANESTHESIA PRE PROCEDURE
Department of Anesthesiology  Preprocedure Note       Name:  Gino Alejandro   Age:  40 y.o.  :  1975                                          MRN:  [id number]         Date:  2021      Surgeon: Maksim Belcher):  Diandra Gong MD    Procedure: Procedure(s):  T10- T11  POSTERIOR LATERAL    FUSION  & INSTRUMENTATION    WITH NEUROMONITORING   EVOKES    NUVASIVE  2 C-ARM    Medications prior to admission:   Prior to Admission medications    Medication Sig Start Date End Date Taking? Authorizing Provider   amphetamine-dextroamphetamine (ADDERALL) 30 MG tablet Take 30 mg by mouth daily. Yes Historical Provider, MD   venlafaxine (EFFEXOR) 37.5 MG tablet Take 37.5 mg by mouth 2 times daily   Yes Historical Provider, MD   topiramate (TOPAMAX) 100 MG tablet Take 100 mg by mouth 2 times daily   Yes Historical Provider, MD   rOPINIRole (REQUIP) 2 MG tablet Take 2 mg by mouth 2 times daily   Yes Historical Provider, MD   levothyroxine (SYNTHROID) 25 MCG tablet Take 25 mcg by mouth Daily  14  Yes Historical Provider, MD   Amivantamab-vmjw 350 MG/7ML SOLN Infuse 7 mLs intravenously every 30 days    Historical Provider, MD   Cladribine (MAVENCLAD, 5 TABS, PO) Take 5 tablets by mouth See Admin Instructions Patient takes 5 tabs twice yearly    Historical Provider, MD   traMADol (ULTRAM) 50 MG tablet Take 50 mg by mouth every 6 hours as needed for Pain.     Historical Provider, MD   vitamin B-12 (CYANOCOBALAMIN) 100 MCG tablet Take 100 mcg by mouth daily     Historical Provider, MD       Current medications:    Current Facility-Administered Medications   Medication Dose Route Frequency Provider Last Rate Last Admin    [START ON 2021] lactated ringers infusion   Intravenous Continuous Derrick Stewart  mL/hr at 21 0624 New Bag at 21 0624    sodium chloride flush 0.9 % injection 10 mL  10 mL Intravenous 2 times per day Robby Nunez DO        sodium chloride flush 0.9 % injection 10 mL  10 BP Readings from Last 3 Encounters:   08/23/21 114/73   08/02/21 128/85   02/18/15 (!) 131/95       NPO Status: Time of last liquid consumption: 2000                        Time of last solid consumption: 1730                        Date of last liquid consumption: 08/22/21                        Date of last solid food consumption: 08/22/21    BMI:   Wt Readings from Last 3 Encounters:   08/23/21 247 lb (112 kg)   08/02/21 247 lb 11.2 oz (112.4 kg)   02/18/15 230 lb (104.3 kg)     Body mass index is 37.56 kg/m². CBC:   Lab Results   Component Value Date    WBC 6.3 08/02/2021    RBC 4.79 08/02/2021    HGB 15.1 08/02/2021    HCT 45.1 08/02/2021    MCV 94.2 08/02/2021    RDW 14.1 08/02/2021     08/02/2021       CMP:   Lab Results   Component Value Date     08/02/2021    K 3.5 08/02/2021     08/02/2021    CO2 20 08/02/2021    BUN 15 08/02/2021    CREATININE 0.82 08/02/2021    GFRAA >60 08/02/2021    LABGLOM >60 08/02/2021    GLUCOSE 96 08/02/2021    CALCIUM 9.2 08/02/2021       POC Tests: No results for input(s): POCGLU, POCNA, POCK, POCCL, POCBUN, POCHEMO, POCHCT in the last 72 hours.     Coags:   Lab Results   Component Value Date    PROTIME 13.5 08/02/2021    INR 1.0 08/02/2021    APTT 24.6 08/02/2021       HCG (If Applicable): No results found for: PREGTESTUR, PREGSERUM, HCG, HCGQUANT     ABGs: No results found for: PHART, PO2ART, DYK3CQP, EOS0USJ, BEART, A7GUPHSK     Type & Screen (If Applicable):  No results found for: LABABO, LABRH    Drug/Infectious Status (If Applicable):  No results found for: HIV, HEPCAB    COVID-19 Screening (If Applicable): No results found for: COVID19        Anesthesia Evaluation   no history of anesthetic complications:   Airway: Mallampati: II  TM distance: >3 FB   Neck ROM: full  Mouth opening: > = 3 FB Dental:          Pulmonary:normal exam    (+) sleep apnea:                             Cardiovascular:  Exercise tolerance: good (>4 METS),       (-)  angina                Neuro/Psych:   (+) neuromuscular disease (neck and back pain): multiple sclerosis,             GI/Hepatic/Renal:   (+) morbid obesity          Endo/Other:    (+) hypothyroidism::., .                 Abdominal:             Vascular: Other Findings:             Anesthesia Plan      general     ASA 3     (Neuromonitoring  )  Induction: intravenous. MIPS: Postoperative opioids intended and Prophylactic antiemetics administered. Anesthetic plan and risks discussed with patient. Plan discussed with CRNA.     Attending anesthesiologist reviewed and agrees with Kellen Irby MD   8/23/2021

## 2021-08-23 NOTE — ACP (ADVANCE CARE PLANNING)
Advance Care Planning     Advance Care Planning Activator (Inpatient)  Conversation Note      Date of ACP Conversation: 2021     Conversation Conducted with: Patient with Decision Making Capacity    ACP Activator: Pre Brock RN        Health Care Decision Maker: Self     Current Designated Health Care Decision Maker: self     Click here to complete Healthcare Decision Makers including section of the Healthcare Decision Maker Relationship (ie \"Primary\")  Today we documented Decision Maker(s) consistent with Legal Next of Kin hierarchy. Care Preferences    Ventilation: \"If you were in your present state of health and suddenly became very ill and were unable to breathe on your own, what would your preference be about the use of a ventilator (breathing machine) if it were available to you? \"      Would the patient desire the use of ventilator (breathing machine)?: yes    \"If your health worsens and it becomes clear that your chance of recovery is unlikely, what would your preference be about the use of a ventilator (breathing machine) if it were available to you? \"     Would the patient desire the use of ventilator (breathing machine)?: No      Resuscitation  \"CPR works best to restart the heart when there is a sudden event, like a heart attack, in someone who is otherwise healthy. Unfortunately, CPR does not typically restart the heart for people who have serious health conditions or who are very sick. \"    \"In the event your heart stopped as a result of an underlying serious health condition, would you want attempts to be made to restart your heart (answer \"yes\" for attempt to resuscitate) or would you prefer a natural death (answer \"no\" for do not attempt to resuscitate)? \" no       [] Yes   [] No   Educated Patient / Deneen Apgar regarding differences between Advance Directives and portable DNR orders.     Length of ACP Conversation in minutes:  10  Conversation Outcomes:  [x] ACP discussion completed  [] Existing advance directive reviewed with patient; no changes to patient's previously recorded wishes  [] New Advance Directive completed  [] Portable Do Not Rescitate prepared for Provider review and signature  [] POLST/POST/MOLST/MOST prepared for Provider review and signature      Follow-up plan:    [] Schedule follow-up conversation to continue planning  [x] Referred individual to Provider for additional questions/concerns   [] Advised patient/agent/surrogate to review completed ACP document and update if needed with changes in condition, patient preferences or care setting    [] This note routed to one or more involved healthcare providers

## 2021-08-23 NOTE — INTERVAL H&P NOTE
Interval H&P Note    Pt Name: Korey Carrizales  MRN: [id number]  YOB: 1975  Date of evaluation: 8/23/2021      [x] I have reviewed in epic the H&P by JEREMY Zendejas CNP for an Interval History and Physical note. [x] I have examined  Korey Carrizales  There are no changes to the patient who is scheduled for a T10- T11 posterior lateral fusion with neuro-monitoring by Dr Michelle Presley for thoracic spine instability. The patient c/o itching from the chlorhexidine wipes this am  Patient shaved her legs this am.  She denies new health changes, fever, chills, wheezing, cough, increased SOB, chest pain, open sores or wounds. Vital signs: /73   Pulse 85   Temp 97.7 °F (36.5 °C) (Temporal)   Resp 18   LMP 10/16/2005   SpO2 96%     Allergies:  Pcn [penicillins]    Medications:    Prior to Admission medications    Medication Sig Start Date End Date Taking? Authorizing Provider   amphetamine-dextroamphetamine (ADDERALL) 30 MG tablet Take 30 mg by mouth daily. Historical Provider, MD   venlafaxine (EFFEXOR) 37.5 MG tablet Take 37.5 mg by mouth 2 times daily    Historical Provider, MD   topiramate (TOPAMAX) 100 MG tablet Take 100 mg by mouth 2 times daily    Historical Provider, MD   rOPINIRole (REQUIP) 2 MG tablet Take 2 mg by mouth 2 times daily    Historical Provider, MD   Amivantamab-vmjw 350 MG/7ML SOLN Infuse 7 mLs intravenously every 30 days    Historical Provider, MD   Cladribine (MAVENCLAD, 5 TABS, PO) Take 5 tablets by mouth See Admin Instructions Patient takes 5 tabs twice yearly    Historical Provider, MD   traMADol (ULTRAM) 50 MG tablet Take 50 mg by mouth every 6 hours as needed for Pain.     Historical Provider, MD   levothyroxine (SYNTHROID) 25 MCG tablet Take 25 mcg by mouth Daily  8/26/14   Historical Provider, MD   vitamin B-12 (CYANOCOBALAMIN) 100 MCG tablet Take 100 mcg by mouth daily     Historical Provider, MD         This is a 40 y.o.obese female who is pleasant, cooperative, alert and oriented x3, in no acute distress    Physical Exam:  Lungs: Bilateral equal air entry, unlabored, clear to ausculation, no wheezing, rales or rhonchi, normal effort  Cardiovascular: HR 85 normal rate, regular rhythm, no murmur, gallop, rub. Abdomen: Soft, round, nontender, nondistended, normal bowel sounds. Skin: small pink flat nondraining lesions scattered on inner thighs and legs with itching past chlorhexidine wipes (Patient shaved this am) Patient  washed with water and cool rags Anesthesiologist notified. Labs:  Recent Labs     08/02/21  1428   HGB 15.1   HCT 45.1   WBC 6.3   MCV 94.2         K 3.5*      CO2 20   BUN 15   CREATININE 0.82   GLUCOSE 96   INR 1.0   PROTIME 13.5   APTT 24.6       No results for input(s): COVID19 in the last 720 hours.     QIAN Gr CNP   Electronically signed 8/23/2021 at 6:09 AM

## 2021-08-23 NOTE — CARE COORDINATION
Case Management Initial Discharge Plan  Graeme Fraser,         Readmission Risk              Risk of Unplanned Readmission:  7             Met with:patient to discuss discharge plans. Information verified: address, contacts, phone number, , insurance Yes  PCP: QIAN Flores CNP  Date of last visit: last Wednesday     Insurance Provider: [facility]     Discharge Planning  Current Residence:  1 story home   Living Arrangements:  Spouse/Significant Other, Parent   Home has 1 stories/1 stairs to climb  Support Systems:  Spouse/Significant Other, Parent  Current Services PTA:  Na  Agency: na   Patient able to perform ADL's:Independent  DME in home:  Cane   DME used to aid ambulation prior to admission:   Cane prn   DME used during admission:  tbd     Potential Assistance Needed:  N/A    Pharmacy: [facility]    Potential Assistance Purchasing Medications:  No  Does patient want to participate in local refill/ meds to beds program?  Yes    Patient agreeable to home care: No  [place] of choice provided:  n/a      Type of Home Care Services:  None  Patient expects to be discharged to:       Prior SNF/Rehab Placement and Facility: No   Agreeable to SNF/Rehab: No  [place] of choice provided: n/a   Evaluation: n/a    Expected Discharge date:  21  Follow Up Appointment: Best Day/ Time:      Transportation provider: spouse   Transportation arrangements needed for discharge: No    Discharge Plan: Pt is from home with spouse, independent. DME-cane. Watch for walker declines vns    Pt was  May 29, 2021- she just changed her name and does not want demo changed just yet.      SP PLIF T10-T11        Electronically signed by Rosibel Stauffer RN on 21 at 4:06 PM EDT

## 2021-08-23 NOTE — PROGRESS NOTES
Physical Therapy    Facility/Department: [facility]  Initial Assessment-day of surgery    NAME: Dulce Han  : 1975  MRN: [id number]    Date of Service: 2021            Surgeon: Poly Sepulveda): 21  Chely Sandhu MD     Procedure: Procedure(s):  T10- T11  POSTERIOR LATERAL    FUSION  & INSTRUMENTATION    WITH NEUROMONITORING   EVOKES    NUVASIVE  2 C-ARM         Assessment   Assessment: Pt. up amb. in room day of surgery, short distance due to pain. Back brace on order. Will continue to progress and prepare for d/c home, tomorrow, 21. Specific instructions for Next Treatment: back brace should have arrived by next treatment; prepare for d/c home  Prognosis: Excellent  Decision Making: High Complexity  PT Education: Goals;PT Role;Plan of Care  Patient Education: walker safety, impt. of OOB  REQUIRES PT FOLLOW UP: Yes  Activity Tolerance  Activity Tolerance: Patient limited by pain       Patient Diagnosis(es): There were no encounter diagnoses. has a past medical history of Balance problem, COVID-19 virus infection, Dizziness, History of depression, Hypothyroidism, Migraines, Multiple sclerosis ([address] Restless leg syndrome, and Sleep apnea. has a past surgical history that includes Hysterectomy; Cholecystectomy; Varicose vein surgery (); Carpal tunnel release (Left, 10-); Carpal tunnel release (Right, 10-); Thoracic spine surgery; back surgery (2021); and laminectomy (N/A, 2021).     Restrictions  Restrictions/Precautions  Restrictions/Precautions: General Precautions, Surgical Protocols, Fall Risk, Up as Tolerated  Position Activity Restriction  Other position/activity restrictions: Back brace ordered but not here yet- orders state OK to mobilize without  Vision/Hearing  Vision: Impaired  Vision Exceptions: Wears glasses at all times  Hearing: Within functional limits     Subjective  General  Chart Reviewed: Yes  Patient assessed for rehabilitation services?: Yes  Additional Pertinent Hx: MS  Family / Caregiver Present: No  Follows Commands: Within Functional Limits  Subjective  Subjective: 8/10 on oral pain meds; requesting IV pain meds (RN administered at start of eval)  Pain Screening  Patient Currently in Pain: Yes          Orientation  Orientation  Overall Orientation Status: Within Normal Limits  Social/Functional History  Social/Functional History  Lives With: Spouse, Family (mother, dtr. in nursing school)  Type of Home: House  Home Layout: One level  Home Access: Stairs to enter without rails  Entrance Stairs - Number of Steps: 1  Bathroom Shower/Tub: Walk-in shower  Bathroom Toilet: Standard  Bathroom Equipment: Shower chair, Grab bars in [address] [place]: Cane  ADL Assistance: Independent  Homemaking Assistance: Independent  Ambulation Assistance: Independent  Transfer Assistance: Independent  Active : Yes  Occupation: Full time employment  Type of occupation:   Leisure & Hobbies: crafts  Additional Comments: Reports one fall in last 6 months- pt. has MS and gets \"lightheaded\" from [address] meds  Cognition   Cognition  Overall Cognitive Status: WNL    Objective     Observation/Palpation  Posture: Good    AROM RLE (degrees)  RLE AROM: WFL  AROM LLE (degrees)  LLE AROM : WFL  AROM RUE (degrees)  RUE AROM : WFL  AROM LUE (degrees)  LUE AROM : WFL  Strength RLE  Strength RLE: WFL  Strength LLE  Strength LLE: WFL  Strength RUE  Strength RUE: WFL  Strength LUE  Strength LUE: WFL  Tone RLE  RLE Tone: Normotonic  Tone LLE  LLE Tone: Normotonic  Sensation  Overall Sensation Status: WFL  Bed mobility  Supine to Sit: Moderate assistance  Sit to Supine:  Moderate assistance  Scooting: Contact guard assistance (sitting)  Transfers  Sit to Stand: Contact guard assistance  Stand to sit: Contact guard assistance  Ambulation  Ambulation?: Yes  Ambulation 1  Surface: level tile  Device: Rolling Walker  Assistance: Contact guard assistance  Quality of Gait: moving slowly, relying on RW for support  Distance: 15ft. Comments: back to bed; positioned on L side, propped with pillows     Balance  Posture: Good  Sitting - Static: Good  Sitting - Dynamic: Good;-  Standing - Static: Fair;+ (RW)  Exercises  Heelslides: 3  Ankle Pumps: 10  Comments: AP and heel slides for circulation LEs     Plan   Plan  Times per week: twice daily/ 7 days/wk  Specific instructions for Next Treatment: back brace should have arrived by next treatment; prepare for d/c home  Current Treatment Recommendations: Strengthening, ROM, Balance Training, Functional Mobility Training, Transfer Training, Endurance Training, Gait Training, Stair training, Safety Education & Training, Patient/Caregiver Education & Training  Safety Devices  Type of devices: All fall risk precautions in place, Gait belt, Call light within reach, Bed alarm in place, Patient at risk for falls, Left in bed, Nurse notified    G-Code       OutComes Score                                                  AM-PAC Score  AM-PAC Inpatient Mobility Raw Score : 15 (08/23/21 1606)  AM-PAC Inpatient T-Scale Score : 39.45 (08/23/21 1606)  Mobility Inpatient CMS 0-100% Score: 57.7 (08/23/21 1606)  Mobility Inpatient CMS G-Code Modifier : CK (08/23/21 1606)          Goals  Short term goals  Time Frame for Short term goals: 6 visits:  Short term goal 1: Pt. to be indep. with bed mob. using log roll tech. (from flat bed with no bedrails to simulate home)  Short term goal 2: Pt to be indep with gait, 50ft (household distance) with RW  Short term goal 3: Pt. to safely negotiate one step with RW to prepare for entering her home at discharge  Short term goal 4: Pt. to have good standing dynamic balance with RW  Patient Goals   Patient goals : no pain       Therapy Time   Individual Concurrent Group Co-treatment   Time In 1513         Time Out 1600         Minutes 47              Treatment time:  35min.     Miguel Reardon, PT

## 2021-08-24 VITALS
TEMPERATURE: 97.9 F | RESPIRATION RATE: 16 BRPM | BODY MASS INDEX: 37.44 KG/M2 | OXYGEN SATURATION: 98 % | HEIGHT: 68 IN | WEIGHT: 247 LBS | DIASTOLIC BLOOD PRESSURE: 70 MMHG | SYSTOLIC BLOOD PRESSURE: 104 MMHG | HEART RATE: 76 BPM

## 2021-08-24 LAB
ANION GAP SERPL CALCULATED.3IONS-SCNC: 10 MMOL/L (ref 9–17)
BUN BLDV-MCNC: 12 MG/DL (ref 6–20)
BUN/CREAT BLD: 19 (ref 9–20)
CALCIUM SERPL-MCNC: 8.5 MG/DL (ref 8.6–10.4)
CHLORIDE BLD-SCNC: 108 MMOL/L (ref 98–107)
CO2: 21 MMOL/L (ref 20–31)
CREAT SERPL-MCNC: 0.62 MG/DL (ref 0.5–0.9)
CULTURE: NO GROWTH
GFR AFRICAN AMERICAN: >60 ML/MIN
GFR NON-AFRICAN AMERICAN: >60 ML/MIN
GFR SERPL CREATININE-BSD FRML MDRD: ABNORMAL ML/MIN/{1.73_M2}
GFR SERPL CREATININE-BSD FRML MDRD: ABNORMAL ML/MIN/{1.73_M2}
GLUCOSE BLD-MCNC: 134 MG/DL (ref 70–99)
HCT VFR BLD CALC: 41 % (ref 36.3–47.1)
HEMOGLOBIN: 13.5 G/DL (ref 11.9–15.1)
Lab: NORMAL
MCH RBC QN AUTO: 31.7 PG (ref 25.2–33.5)
MCHC RBC AUTO-ENTMCNC: 32.9 G/DL (ref 28.4–34.8)
MCV RBC AUTO: 96.2 FL (ref 82.6–102.9)
NRBC AUTOMATED: 0 PER 100 WBC
PDW BLD-RTO: 12.9 % (ref 11.8–14.4)
PLATELET # BLD: 262 K/UL (ref 138–453)
PMV BLD AUTO: 9.7 FL (ref 8.1–13.5)
POTASSIUM SERPL-SCNC: 4.2 MMOL/L (ref 3.7–5.3)
RBC # BLD: 4.26 M/UL (ref 3.95–5.11)
SODIUM BLD-SCNC: 139 MMOL/L (ref 135–144)
SPECIMEN DESCRIPTION: NORMAL
WBC # BLD: 16.6 K/UL (ref 3.5–11.3)

## 2021-08-24 PROCEDURE — 97530 THERAPEUTIC ACTIVITIES: CPT

## 2021-08-24 PROCEDURE — 97116 GAIT TRAINING THERAPY: CPT

## 2021-08-24 PROCEDURE — 97112 NEUROMUSCULAR REEDUCATION: CPT

## 2021-08-24 PROCEDURE — 80048 BASIC METABOLIC PNL TOTAL CA: CPT

## 2021-08-24 PROCEDURE — 2580000003 HC RX 258: Performed by: ORTHOPAEDIC SURGERY

## 2021-08-24 PROCEDURE — 97110 THERAPEUTIC EXERCISES: CPT

## 2021-08-24 PROCEDURE — 6360000002 HC RX W HCPCS: Performed by: ORTHOPAEDIC SURGERY

## 2021-08-24 PROCEDURE — 36415 COLL VENOUS BLD VENIPUNCTURE: CPT

## 2021-08-24 PROCEDURE — 85027 COMPLETE CBC AUTOMATED: CPT

## 2021-08-24 PROCEDURE — 6370000000 HC RX 637 (ALT 250 FOR IP): Performed by: ORTHOPAEDIC SURGERY

## 2021-08-24 RX ORDER — OXYCODONE HCL 10 MG/1
10 TABLET, FILM COATED, EXTENDED RELEASE ORAL EVERY 12 HOURS SCHEDULED
Qty: 6 EACH | Refills: 0 | Status: SHIPPED | OUTPATIENT
Start: 2021-08-24 | End: 2021-08-27

## 2021-08-24 RX ORDER — SENNA AND DOCUSATE SODIUM 50; 8.6 MG/1; MG/1
1 TABLET, FILM COATED ORAL 2 TIMES DAILY
Qty: 60 TABLET | Refills: 0 | Status: ON HOLD | OUTPATIENT
Start: 2021-08-24 | End: 2021-12-22

## 2021-08-24 RX ORDER — TIZANIDINE 4 MG/1
4 TABLET ORAL EVERY 8 HOURS PRN
Qty: 50 TABLET | Refills: 0 | Status: SHIPPED | OUTPATIENT
Start: 2021-08-24

## 2021-08-24 RX ORDER — OXYCODONE HYDROCHLORIDE AND ACETAMINOPHEN 5; 325 MG/1; MG/1
1-2 TABLET ORAL EVERY 4 HOURS PRN
Qty: 60 TABLET | Refills: 0 | Status: SHIPPED | OUTPATIENT
Start: 2021-08-24 | End: 2021-08-31

## 2021-08-24 RX ADMIN — OXYCODONE HYDROCHLORIDE 10 MG: 10 TABLET, FILM COATED, EXTENDED RELEASE ORAL at 08:05

## 2021-08-24 RX ADMIN — DEXAMETHASONE SODIUM PHOSPHATE 6 MG: 10 INJECTION, SOLUTION INTRAMUSCULAR; INTRAVENOUS at 04:46

## 2021-08-24 RX ADMIN — ROPINIROLE HYDROCHLORIDE 2 MG: 1 TABLET, FILM COATED ORAL at 08:05

## 2021-08-24 RX ADMIN — LEVOTHYROXINE SODIUM 25 MCG: 25 TABLET ORAL at 05:24

## 2021-08-24 RX ADMIN — OXYCODONE AND ACETAMINOPHEN 2 TABLET: 5; 325 TABLET ORAL at 12:34

## 2021-08-24 RX ADMIN — OXYCODONE AND ACETAMINOPHEN 2 TABLET: 5; 325 TABLET ORAL at 03:20

## 2021-08-24 RX ADMIN — VENLAFAXINE 37.5 MG: 37.5 TABLET ORAL at 08:05

## 2021-08-24 RX ADMIN — POLYETHYLENE GLYCOL 3350 17 G: 17 POWDER, FOR SOLUTION ORAL at 08:04

## 2021-08-24 RX ADMIN — OXYCODONE AND ACETAMINOPHEN 2 TABLET: 5; 325 TABLET ORAL at 08:05

## 2021-08-24 RX ADMIN — SODIUM CHLORIDE: 9 INJECTION, SOLUTION INTRAVENOUS at 06:19

## 2021-08-24 ASSESSMENT — PAIN SCALES - GENERAL
PAINLEVEL_OUTOF10: 3
PAINLEVEL_OUTOF10: 4
PAINLEVEL_OUTOF10: 7
PAINLEVEL_OUTOF10: 7
PAINLEVEL_OUTOF10: 6
PAINLEVEL_OUTOF10: 4

## 2021-08-24 ASSESSMENT — PAIN DESCRIPTION - LOCATION
LOCATION: BACK

## 2021-08-24 ASSESSMENT — PAIN DESCRIPTION - ORIENTATION
ORIENTATION: MID
ORIENTATION: MID

## 2021-08-24 ASSESSMENT — PAIN DESCRIPTION - PAIN TYPE
TYPE: SURGICAL PAIN

## 2021-08-24 ASSESSMENT — PAIN DESCRIPTION - ONSET: ONSET: AWAKENED FROM SLEEP

## 2021-08-24 ASSESSMENT — PAIN - FUNCTIONAL ASSESSMENT: PAIN_FUNCTIONAL_ASSESSMENT: PREVENTS OR INTERFERES SOME ACTIVE ACTIVITIES AND ADLS

## 2021-08-24 ASSESSMENT — PAIN DESCRIPTION - FREQUENCY: FREQUENCY: CONTINUOUS

## 2021-08-24 ASSESSMENT — PAIN DESCRIPTION - DESCRIPTORS: DESCRIPTORS: DISCOMFORT

## 2021-08-24 ASSESSMENT — PAIN DESCRIPTION - DIRECTION: RADIATING_TOWARDS: LOWER BACK

## 2021-08-24 ASSESSMENT — PAIN DESCRIPTION - PROGRESSION: CLINICAL_PROGRESSION: GRADUALLY WORSENING

## 2021-08-24 NOTE — PROGRESS NOTES
Physical Therapy  Facility/Department: [facility]  Daily Treatment Note  NAME: Juana Marin  : 1975  MRN: [id number]    Date of Service: 2021    Discharge Recommendations:  Patient would benefit from continued therapy after discharge        Assessment   Body structures, Functions, Activity limitations: Decreased functional mobility   Assessment: Patient appropriate to discharge today. Prognosis: Excellent  PT Education: Goals;PT Role;Plan of Care  REQUIRES PT FOLLOW UP: Yes  Activity Tolerance  Activity Tolerance: Patient limited by pain     Patient Diagnosis(es): The primary encounter diagnosis was Spinal instability of thoracic region. A diagnosis of Chronic back pain greater than 3 months duration was also pertinent to this visit. has a past medical history of Balance problem, COVID-19 virus infection, Dizziness, History of depression, Hypothyroidism, Migraines, Multiple sclerosis ([facility] [address] Restless leg syndrome, and Sleep apnea. has a past surgical history that includes Hysterectomy; Cholecystectomy; Varicose vein surgery (); Carpal tunnel release (Left, 10-); Carpal tunnel release (Right, 10-); Thoracic spine surgery; back surgery (2021); and laminectomy (N/A, 2021). Restrictions  Restrictions/Precautions  Restrictions/Precautions: General Precautions, Surgical Protocols, Fall Risk, Up as Tolerated  Required Braces or Orthoses?: No  Position Activity Restriction  Other position/activity restrictions:   Subjective   General  Chart Reviewed: Yes  Additional Pertinent Hx: MS  Family / Caregiver Present: No  Subjective  Subjective: Patient agreeable for PT Ttreatment.           Orientation  Orientation  Overall Orientation Status: Within Normal Limits  Cognition   Cognition  Overall Cognitive Status: WNL  Objective   Bed mobility  Supine to Sit: Contact guard assistance  Sit to Supine: Contact guard assistance  Scooting: Contact guard assistance  Comment: VCs for log roll and tech. Transfers  Sit to Stand: Contact guard assistance  Stand to sit: Contact guard assistance  Bed to Chair: Contact guard assistance  Stand Pivot Transfers: Contact guard assistance  Lateral Transfers: Contact guard assistance  Ambulation  Ambulation?: Yes  More Ambulation?: Yes  Ambulation 1  Surface: level tile  Device: Rolling Walker  Assistance: Contact guard assistance  Quality of Gait: moving slowly, relying on RW for support  Gait Deviations: Slow [place];Decreased step height;Decreased step length  Distance: 50' x 1  Comments: back to bed; positioned on L side, propped with pillows  Ambulation 2  Surface - 2: level tile  Device 2: Rolling Walker  Assistance 2: Contact guard assistance  Quality of Gait 2: Improved to normal speed but reported increased pain. Gait Deviations: Decreased step height;Decreased arm swing  Distance: 20' x2  Comments: Ambulated to rest room, toilet tranfers VCs for hand placement and safety with RW placement, Stood at sink for oral and hand hygiene ~2 mins then ambluated back to EOB. Back brace on for out of bed mobility. Stairs/Curb  Stairs?: No (Patient reports feeling safe with one step at home)     Balance  Posture: Good  Sitting - Static: Good  Sitting - Dynamic: Good  Standing - Static: Good  Standing - Dynamic: Good;-  Comments: w/ Rw  Exercises  Comments: Reviewed seated HEP x 10 reps      Strength RLE  Strength RLE: WFL  Strength LLE  Strength LLE: WFL  Strength RUE  Strength RUE: WFL  Strength LUE  Strength LUE: WFL    AM-PAC Score  AM-PAC Inpatient Mobility Raw Score : 22 (08/24/21 1452)  AM-PAC Inpatient T-Scale Score : 53.28 (08/24/21 1452)  Mobility Inpatient CMS 0-100% Score: 20.91 (08/24/21 1452)  Mobility Inpatient CMS G-Code Modifier : CJ (08/24/21 1452)          Goals  Short term goals  Time Frame for Short term goals: 6 visits:  Short term goal 1: Pt. to be indep. with bed mob. using log roll tech.  (from flat bed with no bedrails to simulate home)  Short term goal 2: Pt to be indep with gait, 50ft (household distance) with RW  Short term goal 3: Pt. to safely negotiate one step with RW to prepare for entering her home at discharge  Short term goal 4: Pt. to have good standing dynamic balance with RW  Patient Goals   Patient goals : no pain    Plan    Plan  Times per week: twice daily/ 7 days/wk  Specific instructions for Next Treatment: back brace should have arrived by next treatment; prepare for d/c home  Current Treatment Recommendations: Strengthening, ROM, Balance Training, Functional Mobility Training, Transfer Training, Endurance Training, Gait Training, Stair training, Safety Education & Training, Patient/Caregiver Education & Training  Safety Devices  Type of devices:  All fall risk precautions in place, Gait belt, Call light within reach, Patient at risk for falls, Nurse notified, Left in bed  Restraints  Initially in place: No     Therapy Time   Individual Concurrent Group Co-treatment   Time In 0856 [id number]       Time Out 0922 1436       Minutes [address] [place], [place]

## 2021-08-24 NOTE — PLAN OF CARE
Problem: Pain:  Goal: Pain level will decrease  Description: Pain level will decrease  8/24/2021 1506 by Charisse Ceron RN  Outcome: Completed  8/24/2021 1046 by Charisse Ceron RN  Outcome: Ongoing  Goal: Control of acute pain  Description: Control of acute pain  8/24/2021 1506 by Charisse Ceron RN  Outcome: Completed  8/24/2021 1046 by Charisse Ceron RN  Outcome: Ongoing  Goal: Control of chronic pain  Description: Control of chronic pain  8/24/2021 1506 by Charisse Ceron RN  Outcome: Completed  8/24/2021 1046 by Charisse Ceron RN  Outcome: Ongoing     Problem: Falls - Risk of:  Goal: Will remain free from falls  Description: Will remain free from falls  8/24/2021 1506 by Charisse Ceron RN  Outcome: Completed  8/24/2021 1046 by Charisse Ceron RN  Outcome: Ongoing  Goal: Absence of physical injury  Description: Absence of physical injury  8/24/2021 1506 by Charisse Ceron RN  Outcome: Completed  8/24/2021 1046 by Charisse Ceron RN  Outcome: Ongoing     Problem: IP BALANCE  Goal: LTG - Patient will maintain balance to allow for safe/functional mobility  8/24/2021 1506 by Charisse Ceron RN  Outcome: Completed  8/24/2021 1046 by Charisse Ceron RN  Outcome: Ongoing

## 2021-08-24 NOTE — PROGRESS NOTES
[facility]ine  Attending Progress Note  8/24/2021  7:55 AM     Jessica Quivers    1975   [id number]      SUBJECTIVE:  Doing ok. Moderate pain but improving. She was up to bathroom and ambulating well. Denies leg symptoms. No CP/SOB    OBJECTIVE      Physical      VITALS:  /70   Pulse 76   Temp 97.9 °F (36.6 °C) (Oral)   Resp 16   Ht 5' 8\" (1.727 m)   Wt 247 lb (112 kg)   LMP 10/16/2005   SpO2 98%   BMI 37.56 kg/m²     Dressing C/D/I    NEUROLOGIC: Alert and Oriented x 3. Strength 5/5 HF, 5/5 Q, 5/5 TA, 5/5 EHL, 5/5 GS. 5/5 D, 5/5 B, 5/5 T, 5/5 WE, 5/5 WF, 5/5 I                                                                  Sensation intact.      Data  CBC:   Lab Results   Component Value Date    WBC 16.6 08/24/2021    RBC 4.26 08/24/2021    HGB 13.5 08/24/2021    HCT 41.0 08/24/2021    MCV 96.2 08/24/2021    MCH 31.7 08/24/2021    MCHC 32.9 08/24/2021    RDW 12.9 08/24/2021     08/24/2021    MPV 9.7 08/24/2021     BMP:    Lab Results   Component Value Date     08/24/2021    K 4.2 08/24/2021     08/24/2021    CO2 21 08/24/2021    BUN 12 08/24/2021    CREATININE 0.62 08/24/2021    CALCIUM 8.5 08/24/2021    GFRAA >60 08/24/2021    LABGLOM >60 08/24/2021    GLUCOSE 134 08/24/2021           Current Inpatient Medications    Current Facility-Administered Medications: amphetamine-dextroamphetamine (ADDERALL XR) extended release capsule 30 mg, 30 mg, Oral, Daily  levothyroxine (SYNTHROID) tablet 25 mcg, 25 mcg, Oral, Daily  rOPINIRole (REQUIP) tablet 2 mg, 2 mg, Oral, BID  topiramate (TOPAMAX) tablet 100 mg, 100 mg, Oral, BID  venlafaxine (EFFEXOR) tablet 37.5 mg, 37.5 mg, Oral, BID  0.9 % sodium chloride infusion, , Intravenous, Continuous  sodium chloride flush 0.9 % injection 10 mL, 10 mL, Intravenous, 2 times per day  sodium chloride flush 0.9 % injection 10 mL, 10 mL, Intravenous, PRN  0.9 % sodium chloride infusion, 25 mL, Intravenous, PRN  HYDROmorphone (DILAUDID) injection 0.25 mg, 0.25 mg, Intravenous, Q3H PRN **OR** HYDROmorphone (DILAUDID) injection 0.5 mg, 0.5 mg, Intravenous, Q3H PRN  polyethylene glycol (GLYCOLAX) packet 17 g, 17 g, Oral, Daily  bisacodyl (DULCOLAX) EC tablet 5 mg, 5 mg, Oral, Daily  sennosides-docusate sodium (SENOKOT-S) 8.6-50 MG tablet 1 tablet, 1 tablet, Oral, BID  promethazine (PHENERGAN) tablet 12.5 mg, 12.5 mg, Oral, Q6H PRN **OR** ondansetron (ZOFRAN) injection 4 mg, 4 mg, Intravenous, Q6H PRN  tiZANidine (ZANAFLEX) tablet 4 mg, 4 mg, Oral, Q8H PRN  oxyCODONE-acetaminophen (PERCOCET) 5-325 MG per tablet 1 tablet, 1 tablet, Oral, Q4H PRN **OR** oxyCODONE-acetaminophen (PERCOCET) 5-325 MG per tablet 2 tablet, 2 tablet, Oral, Q4H PRN  oxyCODONE (OXYCONTIN) extended release tablet 10 mg, 10 mg, Oral, 2 times per day    ASSESSMENT AND PLAN    40 y.o. female status post T10-11 PLF  post op day #  1    1. PT- WBAT  2. Pain control  3. EPC  4. D/C plan for home later today if pain controlled.   5. Will need walker for home due to gait instability after spine surgery      Ebonie Nair MD  [facility]  Spine Surgeon  [phone]

## 2021-08-24 NOTE — CARE COORDINATION
DC Planning    [place], faces[place], [place] [facility] [place] [facility] for dc today.

## 2021-08-24 NOTE — PROGRESS NOTES
CLINICAL PHARMACY NOTE: MEDS TO BEDS    Total # of Prescriptions Filled: 3   The following medications were delivered to the patient:  · PERCOCET 5-325  OXYCONTIN ER 10MG  TIZANIDINE 4MG  Additional Documentation:  PT BOUGHT OTC BOTTLE OF SENNA PLUS 8.6-50MG

## 2021-08-24 NOTE — PLAN OF CARE
Problem: Pain:  Goal: Pain level will decrease  Description: Pain level will decrease  Outcome: Ongoing     Problem: Pain:  Goal: Control of chronic pain  Description: Control of chronic pain  Outcome: Ongoing     Problem: Pain:  Goal: Control of chronic pain  Description: Control of chronic pain  Outcome: Ongoing     Problem: Falls - Risk of:  Goal: Will remain free from falls  Description: Will remain free from falls  Outcome: Ongoing     Problem: Falls - Risk of:  Goal: Absence of physical injury  Description: Absence of physical injury  Outcome: Ongoing

## 2021-08-24 NOTE — FLOWSHEET NOTE
08/24/21 0530 08/24/21 0545   Mobility   Activity To Bathroom Return to bed; In bed   Level of Assistance Modified independent, requires aide device or extra time Modified independent, requires aide device or extra time   Assistive Device Front wheel walker Front wheel walker   Distance Ambulated (ft)  --  15 ft   Ambulation Response Tolerated fairly well Tolerated fairly well   Repositioned  --  Turns self   Patient Turned  --  Turns self   Head of Bed Elevated   --  Self regulated   Heels/Feet  --  Foot of bed elevated   Range of Motion Active; All extremities Active; All extremities   Anti-Embolism Devices  --  Bilateral;Pneumatic compression devices, below knee   Anti-Embolism Intervention  --  On

## 2021-08-24 NOTE — PLAN OF CARE
Problem: Pain:  Goal: Pain level will decrease  Description: Pain level will decrease  8/24/2021 1046 by Walker Small RN  Outcome: Ongoing  8/24/2021 0027 by Rancho Shukla RN  Outcome: Ongoing  Goal: Control of acute pain  Description: Control of acute pain  Outcome: Ongoing  Goal: Control of chronic pain  Description: Control of chronic pain  8/24/2021 1046 by Walker Small RN  Outcome: Ongoing  8/24/2021 0027 by Rancho Shukla RN  Outcome: Ongoing     Problem: Falls - Risk of:  Goal: Will remain free from falls  Description: Will remain free from falls  8/24/2021 1046 by Walker Small RN  Outcome: Ongoing  8/24/2021 0027 by Rancho Shukla RN  Outcome: Ongoing  Goal: Absence of physical injury  Description: Absence of physical injury  8/24/2021 1046 by Walker Small RN  Outcome: Ongoing  8/24/2021 0027 by Rancho Shukla RN  Outcome: Ongoing     Problem: IP BALANCE  Goal: LTG - Patient will maintain balance to allow for safe/functional mobility  Outcome: Ongoing   Patient is a fall risk during this admission. Fall risk assessment was performed. Patient is absent of falls. Bed is in the lowest position. Wheels on the bed are locked. Call light and bed side table are within reach. Clutter is removed. Patient was educated to call out when needing assistance or wanting to get out of bed. Patient offered toileting assistance during rounding. Hourly rounds have been performed. Pt medicated with pain medication prn. Assessed all pain characteristics including level, type, location, frequency, and onset. Non-pharmacologic interventions offered to pt as well. Pt states pain is tolerable at this time.  Will continue to monitor

## 2021-08-24 NOTE — OP NOTE
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 T10-11 minimally invasive posterolateral spinal fusion   2. Insertion of posterior spinal instrumentation at T10-11      utilizing NuVasive Reline pedicle screws and rods. 3. Bradford of local bone for spinal fusion. 4. Use of allograft bone for spinal fusion to include DBM,       as well as crushed cancellous allograft bone. 5. Intraoperative use of C-arm fluoroscopy. ESTIMATED BLOOD LOSS:  200 mL. FLUIDS:  Per Anesthesia record. COMPLICATIONS:  None. INDICATIONS:  This is a pleasant 40year-old female with a  longstanding history of severe back pain since previous left hemilaminectomy year ago at T10-11. She had done extensive  conservative management to include physical therapy, medications,  pain management, all with minimal benefit. CT thoracic myelegram showed resection of over 50% of left T10-11 facet joint leading to instability. Due to  her failure of conservative management, it was discussed with her  the option of performing a fusion in an attempt  to help alleviate her symptoms. Risks were discussed including  bleeding, infection, injury to nerves, vessels, anesthetic risk,  need for possible further future surgery, as well as the  possibility for continued pain and continued symptoms, as well as  possible dural tear and possible nonunion. She did understand  all these risks, did wish to proceed, and informed consent was  obtained. DESCRIPTION OF PROCEDURE:  The patient was taken to the operating  room, kept supine on her bed. She was intubated and placed under  general anesthesia by anesthesiologist.  She was given  preoperative antibiotic prophylaxis.   Stoll catheter was done on the right side as well at T10-11. Rods were then measured and placed into the heads of the screws and set screws were then placed and subsequently final  tightened. At this point, C-arm confirmed that the rods  were in good position. The tower devices were removed and kim   was removed. Final pictures were then taken in AP and  lateral views, which showed all instrumentation to be in  excellent position at T10-11. At this point, the wounds were then  irrigated with sterile saline. Vancomycin powder was placed  followed by closure with 0-Vicryl, 2-0 Vicryl, and a running 4-0  Monocryl suture with Dermabond glue. Standard dressings were  then applied. She was then placed supine on her bed, extubated,  and taken to recovery room in stable condition.     Electronically signed by Devonte Brothers MD on 8/23/2021 at 11:52 PM

## 2021-12-02 ENCOUNTER — HOSPITAL ENCOUNTER (OUTPATIENT)
Age: 46
Setting detail: OBSERVATION
Discharge: HOME | End: 2021-12-03
Attending: EMERGENCY MEDICINE | Admitting: INTERNAL MEDICINE
Payer: COMMERCIAL

## 2021-12-02 DIAGNOSIS — G43.019 INTRACTABLE MIGRAINE WITHOUT AURA AND WITHOUT STATUS MIGRAINOSUS: Primary | ICD-10-CM

## 2021-12-02 PROBLEM — G43.819 MIGRAINE VARIANT, INTRACTABLE: Status: ACTIVE | Noted: 2021-12-02

## 2021-12-02 LAB
ABSOLUTE EOS #: 0.28 K/UL (ref 0–0.44)
ABSOLUTE IMMATURE GRANULOCYTE: 0.05 K/UL (ref 0–0.3)
ABSOLUTE LYMPH #: 0.99 K/UL (ref 1.1–3.7)
ABSOLUTE MONO #: 0.68 K/UL (ref 0.1–1.2)
ANION GAP SERPL CALCULATED.3IONS-SCNC: 11 MMOL/L (ref 9–17)
BASOPHILS # BLD: 1 % (ref 0–2)
BASOPHILS ABSOLUTE: 0.04 K/UL (ref 0–0.2)
BUN BLDV-MCNC: 17 MG/DL (ref 6–20)
BUN/CREAT BLD: 28 (ref 9–20)
CALCIUM SERPL-MCNC: 8.9 MG/DL (ref 8.6–10.4)
CHLORIDE BLD-SCNC: 107 MMOL/L (ref 98–107)
CO2: 21 MMOL/L (ref 20–31)
CREAT SERPL-MCNC: 0.61 MG/DL (ref 0.5–0.9)
DIFFERENTIAL TYPE: ABNORMAL
EOSINOPHILS RELATIVE PERCENT: 5 % (ref 1–4)
GFR AFRICAN AMERICAN: >60 ML/MIN
GFR NON-AFRICAN AMERICAN: >60 ML/MIN
GFR SERPL CREATININE-BSD FRML MDRD: ABNORMAL ML/MIN/{1.73_M2}
GFR SERPL CREATININE-BSD FRML MDRD: ABNORMAL ML/MIN/{1.73_M2}
GLUCOSE BLD-MCNC: 93 MG/DL (ref 70–99)
HCT VFR BLD CALC: 45.2 % (ref 36.3–47.1)
HEMOGLOBIN: 15.2 G/DL (ref 11.9–15.1)
IMMATURE GRANULOCYTES: 1 %
LYMPHOCYTES # BLD: 17 % (ref 24–43)
MAGNESIUM: 2.2 MG/DL (ref 1.6–2.6)
MCH RBC QN AUTO: 30.9 PG (ref 25.2–33.5)
MCHC RBC AUTO-ENTMCNC: 33.6 G/DL (ref 28.4–34.8)
MCV RBC AUTO: 91.9 FL (ref 82.6–102.9)
MONOCYTES # BLD: 12 % (ref 3–12)
NRBC AUTOMATED: 0 PER 100 WBC
PDW BLD-RTO: 13.2 % (ref 11.8–14.4)
PLATELET # BLD: 277 K/UL (ref 138–453)
PLATELET ESTIMATE: ABNORMAL
PMV BLD AUTO: 9.5 FL (ref 8.1–13.5)
POTASSIUM SERPL-SCNC: 3.5 MMOL/L (ref 3.7–5.3)
RBC # BLD: 4.92 M/UL (ref 3.95–5.11)
RBC # BLD: ABNORMAL 10*6/UL
SEG NEUTROPHILS: 64 % (ref 36–65)
SEGMENTED NEUTROPHILS ABSOLUTE COUNT: 3.7 K/UL (ref 1.5–8.1)
SODIUM BLD-SCNC: 139 MMOL/L (ref 135–144)
WBC # BLD: 5.7 K/UL (ref 3.5–11.3)
WBC # BLD: ABNORMAL 10*3/UL

## 2021-12-02 PROCEDURE — 99219 PR INITIAL OBSERVATION CARE/DAY 50 MINUTES: CPT | Performed by: NURSE PRACTITIONER

## 2021-12-02 PROCEDURE — 99283 EMERGENCY DEPT VISIT LOW MDM: CPT

## 2021-12-02 PROCEDURE — 80048 BASIC METABOLIC PNL TOTAL CA: CPT

## 2021-12-02 PROCEDURE — 6360000002 HC RX W HCPCS: Performed by: EMERGENCY MEDICINE

## 2021-12-02 PROCEDURE — 6370000000 HC RX 637 (ALT 250 FOR IP): Performed by: EMERGENCY MEDICINE

## 2021-12-02 PROCEDURE — 96366 THER/PROPH/DIAG IV INF ADDON: CPT

## 2021-12-02 PROCEDURE — 96376 TX/PRO/DX INJ SAME DRUG ADON: CPT

## 2021-12-02 PROCEDURE — 85025 COMPLETE CBC W/AUTO DIFF WBC: CPT

## 2021-12-02 PROCEDURE — 96367 TX/PROPH/DG ADDL SEQ IV INF: CPT

## 2021-12-02 PROCEDURE — 2580000003 HC RX 258: Performed by: EMERGENCY MEDICINE

## 2021-12-02 PROCEDURE — 83735 ASSAY OF MAGNESIUM: CPT

## 2021-12-02 PROCEDURE — 96365 THER/PROPH/DIAG IV INF INIT: CPT

## 2021-12-02 PROCEDURE — G0378 HOSPITAL OBSERVATION PER HR: HCPCS

## 2021-12-02 PROCEDURE — 96375 TX/PRO/DX INJ NEW DRUG ADDON: CPT

## 2021-12-02 RX ORDER — MAGNESIUM SULFATE 1 G/100ML
1000 INJECTION INTRAVENOUS
Status: COMPLETED | OUTPATIENT
Start: 2021-12-02 | End: 2021-12-02

## 2021-12-02 RX ORDER — DIHYDROERGOTAMINE MESYLATE 1 MG/ML
1 INJECTION, SOLUTION INTRAMUSCULAR; INTRAVENOUS; SUBCUTANEOUS ONCE
Status: COMPLETED | OUTPATIENT
Start: 2021-12-02 | End: 2021-12-02

## 2021-12-02 RX ORDER — 0.9 % SODIUM CHLORIDE 0.9 %
1000 INTRAVENOUS SOLUTION INTRAVENOUS ONCE
Status: COMPLETED | OUTPATIENT
Start: 2021-12-02 | End: 2021-12-02

## 2021-12-02 RX ORDER — ONDANSETRON 2 MG/ML
4 INJECTION INTRAMUSCULAR; INTRAVENOUS ONCE
Status: COMPLETED | OUTPATIENT
Start: 2021-12-02 | End: 2021-12-02

## 2021-12-02 RX ORDER — DIPHENHYDRAMINE HYDROCHLORIDE 50 MG/ML
25 INJECTION INTRAMUSCULAR; INTRAVENOUS ONCE
Status: COMPLETED | OUTPATIENT
Start: 2021-12-02 | End: 2021-12-02

## 2021-12-02 RX ORDER — ALPRAZOLAM 0.25 MG/1
0.25 TABLET ORAL ONCE
Status: COMPLETED | OUTPATIENT
Start: 2021-12-02 | End: 2021-12-02

## 2021-12-02 RX ADMIN — DIPHENHYDRAMINE HYDROCHLORIDE 25 MG: 50 INJECTION INTRAMUSCULAR; INTRAVENOUS at 17:34

## 2021-12-02 RX ADMIN — ALPRAZOLAM 0.25 MG: 0.25 TABLET ORAL at 20:41

## 2021-12-02 RX ADMIN — DIHYDROERGOTAMINE MESYLATE 1 MG: 1 INJECTION INTRAMUSCULAR; INTRAVENOUS; SUBCUTANEOUS at 21:53

## 2021-12-02 RX ADMIN — DIHYDROERGOTAMINE MESYLATE 1 MG: 1 INJECTION INTRAMUSCULAR; INTRAVENOUS; SUBCUTANEOUS at 17:35

## 2021-12-02 RX ADMIN — MAGNESIUM SULFATE HEPTAHYDRATE 1000 MG: 1 INJECTION, SOLUTION INTRAVENOUS at 21:53

## 2021-12-02 RX ADMIN — SODIUM CHLORIDE 1000 ML: 9 INJECTION, SOLUTION INTRAVENOUS at 17:34

## 2021-12-02 RX ADMIN — MAGNESIUM SULFATE HEPTAHYDRATE 1000 MG: 1 INJECTION, SOLUTION INTRAVENOUS at 20:41

## 2021-12-02 RX ADMIN — SODIUM CHLORIDE 500 MG: 9 INJECTION, SOLUTION INTRAVENOUS at 18:35

## 2021-12-02 RX ADMIN — ONDANSETRON 4 MG: 2 INJECTION INTRAMUSCULAR; INTRAVENOUS at 17:34

## 2021-12-02 ASSESSMENT — ENCOUNTER SYMPTOMS
SHORTNESS OF BREATH: 0
BACK PAIN: 0
CHEST TIGHTNESS: 0
EYE PAIN: 0
FACIAL SWELLING: 0
ABDOMINAL DISTENTION: 0
ABDOMINAL PAIN: 0
EYE DISCHARGE: 0

## 2021-12-02 ASSESSMENT — PAIN SCALES - GENERAL
PAINLEVEL_OUTOF10: 8

## 2021-12-02 NOTE — ED NOTES
Bed: 12  Expected date:   Expected time:   Means of arrival:   Comments:  Cancer center     Alberto Stratton, MATTHEW  12/02/21 6007

## 2021-12-02 NOTE — ED PROVIDER NOTES
EMERGENCY DEPARTMENT ENCOUNTER    Pt Name: Feliciano Tobar  MRN: [id number]  [place] 1975  Date of evaluation: 12/2/21  CHIEF COMPLAINT       Chief Complaint   Patient presents with    Migraine     Sent by neurologist for MS flare up; migraine x1 week without relief     HISTORY OF PRESENT ILLNESS   HPI  The patient is a 71-year-old female with a history of MS as well as migraine headaches who presented to the emergency department secondary to headache. Patient complains of greater than 1 week history of a migraine headache not receptive to her home medications. She was evaluated by a neurologist yesterday with recommendation to come to the emergency department to receive steroids as well as DHE however patient decided to go home and come in today. She is complain of a headache and out of 10 on the pain scale localized to the front of her head consistent nature with previous migraine headaches. Of note, patient underwent back surgery 1 week ago she has pain in her back with no new injury or trauma. She denied loss of bladder or bowel urinary retention. Patient denied chest pain, shortness of breath, nausea, vomiting, fevers or chills  REVIEW OF SYSTEMS     Review of Systems   Constitutional: Negative for chills, diaphoresis and fever. HENT: Negative for congestion, ear pain and facial swelling. Eyes: Negative for pain, discharge and visual disturbance. Respiratory: Negative for chest tightness and shortness of breath. Cardiovascular: Negative for chest pain and palpitations. Gastrointestinal: Negative for abdominal distention and abdominal pain. Genitourinary: Negative for difficulty urinating and flank pain. Musculoskeletal: Negative for back pain. Skin: Negative for wound. Neurological: Positive for headaches. Negative for dizziness and light-headedness.      PASTMEDICAL HISTORY     Past Medical History:   Diagnosis Date    Balance problem     COVID-19 virus infection 02/2021    Dizziness  History of depression     Hypothyroidism     Migraines     Multiple sclerosis (HCC)     Restless leg syndrome     Sleep apnea     patient does not wear CPAP     SURGICAL HISTORY       Past Surgical History:   Procedure Laterality Date    BACK SURGERY  08/23/2021    T10-11 posterior lateral fusion    CARPAL TUNNEL RELEASE Left 10-    CARPAL TUNNEL RELEASE Right 10-    CHOLECYSTECTOMY      HYSTERECTOMY      LAMINECTOMY N/A 8/23/2021    T10- T11  POSTERIOR LATERAL    FUSION  & INSTRUMENTATION    WITH NEUROMONITORING   EVOKES    NUVASIVE  2 C-ARM performed by Bryan Lozano MD at [address] [facility]       Previous Medications    AMIVANTAMAB-VMJW 350 MG/7ML SOLN    Infuse 7 mLs intravenously every 30 days    AMPHETAMINE-DEXTROAMPHETAMINE (ADDERALL) 30 MG TABLET    Take 30 mg by mouth daily. XR 30mg    LEVOTHYROXINE (SYNTHROID) 25 MCG TABLET    Take 25 mcg by mouth Daily     ROPINIROLE (REQUIP) 2 MG TABLET    Take 2 mg by mouth 2 times daily    SENNOSIDES-DOCUSATE SODIUM (SENOKOT-S) 8.6-50 MG TABLET    Take 1 tablet by mouth 2 times daily    TIZANIDINE (ZANAFLEX) 4 MG TABLET    Take 1 tablet by mouth every 8 hours as needed (spasm)    TOPIRAMATE (TOPAMAX) 100 MG TABLET    Take 100 mg by mouth 2 times daily    VENLAFAXINE (EFFEXOR) 37.5 MG TABLET    Take 37.5 mg by mouth 2 times daily    VITAMIN B-12 (CYANOCOBALAMIN) 100 MCG TABLET    Take 100 mcg by mouth daily      ALLERGIES     is allergic to pcn [penicillins]. FAMILY HISTORY     has no family status information on file.       SOCIAL HISTORY       Social History     Tobacco Use    Smoking status: Never Smoker    Smokeless tobacco: Never Used   Vaping Use    Vaping Use: Never used   Substance Use Topics    Alcohol use: No    Drug use: No     PHYSICAL EXAM     INITIAL VITALS: BP (!) 136/94   Pulse 107   Temp 97.6 °F (36.4 °C) (Oral)   Resp 18   Ht 5' 8\" (1.727 m)   Wt 240 lb (108.9 kg)   LMP 10/16/2005   SpO2 98%   BMI 36.49 kg/m²    Physical Exam  Vitals and nursing note reviewed. Constitutional:       General: She is not in acute distress. Appearance: She is well-developed. She is not diaphoretic. HENT:      Head: Normocephalic and atraumatic. Eyes:      Pupils: Pupils are equal, round, and reactive to light. Cardiovascular:      Rate and Rhythm: Normal rate and regular rhythm. Pulmonary:      Effort: Pulmonary effort is normal.      Breath sounds: Normal breath sounds. Abdominal:      General: Bowel sounds are normal.      Palpations: Abdomen is soft. Musculoskeletal:         General: Normal range of motion. Cervical back: Normal range of motion and neck supple. Skin:     General: Skin is warm. Capillary Refill: Capillary refill takes less than 2 seconds. Neurological:      Mental Status: She is alert and oriented to person, place, and time. MEDICAL DECISION MAKING:   The patient is a 50-year-old female who presented to the emergency department secondary to headache. Orders for IV fluids, Solu-Medrol IV piggyback DHE Benadryl as well as Zofran. Patient was reevaluated had some minimal improvement in her headache and therefore she received magnesium as well as Xanax with no improvement in her headache. Discussed with patient admission for neurology consult and pain control she is amenable to this treatment plan. Internal medicine service paged for further evaluation and treatment. The internal medicine service agreed to admit for further evaluation treatment. Patient also discussed with Dr. Chastity Anderson with the neurology service will follow on admission. All patient's question's and concerns were answered prior to disposition and patient and/or family expressed understanding and agreement of treatment plan.         CRITICAL CARE:              NIH STROKE SCALE:            PROCEDURES:    Procedures    DIAGNOSTIC RESULTS   EKG:All EKG's are interpreted by the Emergency Department Physician who either signs or Co-signs this chart in the absence of a cardiologist.        RADIOLOGY:All plain film, CT, MRI, and formal ultrasound images (except ED bedside ultrasound) are read by the radiologist, see reports below, unless otherwisenoted in MDM or here. No orders to display     LABS: All lab results were reviewed by myself, and all abnormals are listed below. Labs Reviewed   BASIC METABOLIC PANEL W/ REFLEX TO MG FOR LOW K - Abnormal; Notable for the following components:       Result Value    Bun/Cre Ratio 28 (*)     Potassium 3.5 (*)     All other components within normal limits   CBC WITH AUTO DIFFERENTIAL - Abnormal; Notable for the following components:    Hemoglobin 15.2 (*)     Lymphocytes 17 (*)     Eosinophils % 5 (*)     Immature Granulocytes 1 (*)     Absolute Lymph # 0.99 (*)     All other components within normal limits   MAGNESIUM       EMERGENCY DEPARTMENTCOURSE:         Vitals:    Vitals:    12/02/21 1643   BP: (!) 136/94   Pulse: 107   Resp: 18   Temp: 97.6 °F (36.4 °C)   TempSrc: Oral   SpO2: 98%   Weight: 240 lb (108.9 kg)   Height: 5' 8\" (1.727 m)       The patient was given the following medications while in the emergency department:  Orders Placed This Encounter   Medications    0.9 % sodium chloride bolus    methylPREDNISolone sodium (SOLU-MEDROL) 500 mg in sodium chloride 0.9 % 250 mL IVPB    diphenhydrAMINE (BENADRYL) injection 25 mg    dihydroergotamine (DHE) injection 1 mg    ondansetron (ZOFRAN) injection 4 mg    magnesium sulfate 1000 mg in dextrose 5% 100 mL IVPB    ALPRAZolam (XANAX) tablet 0.25 mg    dihydroergotamine (DHE) injection 1 mg     CONSULTS:  IP CONSULT TO INTERNAL MEDICINE  IP CONSULT TO NEUROLOGY    FINAL IMPRESSION      1.  Intractable migraine without aura and without status migrainosus          DISPOSITION/PLAN   DISPOSITION Decision To Admit 12/02/2021 09:46:51 PM      PATIENT REFERRED TO:  No follow-up provider specified. DISCHARGE MEDICATIONS:  New Prescriptions    No medications on file     Mirella Mejia MD  Attending Emergency Physician      The care is provided during an unprecedented national emergency due to the novel coronavirus, COVID 19. This note was created with the assistance of a speech-recognition program. While intending to generate a document that actually reflects the content of the visit, no guarantees can be provided that every mistake has been identified and corrected by editing.     Gwen Arce MD  [id number]

## 2021-12-03 VITALS
HEART RATE: 88 BPM | DIASTOLIC BLOOD PRESSURE: 71 MMHG | WEIGHT: 240 LBS | TEMPERATURE: 98.6 F | SYSTOLIC BLOOD PRESSURE: 125 MMHG | HEIGHT: 68 IN | RESPIRATION RATE: 18 BRPM | BODY MASS INDEX: 36.37 KG/M2 | OXYGEN SATURATION: 96 %

## 2021-12-03 PROBLEM — R73.9 STEROID-INDUCED HYPERGLYCEMIA: Status: ACTIVE | Noted: 2021-12-03

## 2021-12-03 PROBLEM — T38.0X5A STEROID-INDUCED HYPERGLYCEMIA: Status: ACTIVE | Noted: 2021-12-03

## 2021-12-03 LAB
ANION GAP SERPL CALCULATED.3IONS-SCNC: 12 MMOL/L (ref 9–17)
BUN BLDV-MCNC: 14 MG/DL (ref 6–20)
BUN/CREAT BLD: 20 (ref 9–20)
CALCIUM SERPL-MCNC: 9 MG/DL (ref 8.6–10.4)
CHLORIDE BLD-SCNC: 109 MMOL/L (ref 98–107)
CO2: 19 MMOL/L (ref 20–31)
CREAT SERPL-MCNC: 0.7 MG/DL (ref 0.5–0.9)
GFR AFRICAN AMERICAN: >60 ML/MIN
GFR NON-AFRICAN AMERICAN: >60 ML/MIN
GFR SERPL CREATININE-BSD FRML MDRD: ABNORMAL ML/MIN/{1.73_M2}
GFR SERPL CREATININE-BSD FRML MDRD: ABNORMAL ML/MIN/{1.73_M2}
GLUCOSE BLD-MCNC: 208 MG/DL (ref 70–99)
HCT VFR BLD CALC: 48.3 % (ref 36.3–47.1)
HEMOGLOBIN: 16.1 G/DL (ref 11.9–15.1)
INR BLD: 1
MCH RBC QN AUTO: 31 PG (ref 25.2–33.5)
MCHC RBC AUTO-ENTMCNC: 33.3 G/DL (ref 28.4–34.8)
MCV RBC AUTO: 92.9 FL (ref 82.6–102.9)
NRBC AUTOMATED: 0 PER 100 WBC
PDW BLD-RTO: 12.9 % (ref 11.8–14.4)
PLATELET # BLD: 281 K/UL (ref 138–453)
PMV BLD AUTO: 9.5 FL (ref 8.1–13.5)
POTASSIUM SERPL-SCNC: 4.1 MMOL/L (ref 3.7–5.3)
PROTHROMBIN TIME: 12.7 SEC (ref 11.5–14.2)
RBC # BLD: 5.2 M/UL (ref 3.95–5.11)
SODIUM BLD-SCNC: 140 MMOL/L (ref 135–144)
TSH SERPL DL<=0.05 MIU/L-ACNC: 0.6 MIU/L (ref 0.3–5)
WBC # BLD: 4.9 K/UL (ref 3.5–11.3)

## 2021-12-03 PROCEDURE — 96366 THER/PROPH/DIAG IV INF ADDON: CPT

## 2021-12-03 PROCEDURE — 97162 PT EVAL MOD COMPLEX 30 MIN: CPT

## 2021-12-03 PROCEDURE — 97530 THERAPEUTIC ACTIVITIES: CPT

## 2021-12-03 PROCEDURE — 6370000000 HC RX 637 (ALT 250 FOR IP): Performed by: NURSE PRACTITIONER

## 2021-12-03 PROCEDURE — 96375 TX/PRO/DX INJ NEW DRUG ADDON: CPT

## 2021-12-03 PROCEDURE — 99224 PR SBSQ OBSERVATION CARE/DAY 15 MINUTES: CPT | Performed by: PSYCHIATRY & NEUROLOGY

## 2021-12-03 PROCEDURE — 85610 PROTHROMBIN TIME: CPT

## 2021-12-03 PROCEDURE — 85027 COMPLETE CBC AUTOMATED: CPT

## 2021-12-03 PROCEDURE — 2580000003 HC RX 258: Performed by: PSYCHIATRY & NEUROLOGY

## 2021-12-03 PROCEDURE — 2580000003 HC RX 258: Performed by: NURSE PRACTITIONER

## 2021-12-03 PROCEDURE — 97166 OT EVAL MOD COMPLEX 45 MIN: CPT

## 2021-12-03 PROCEDURE — 6360000002 HC RX W HCPCS: Performed by: NURSE PRACTITIONER

## 2021-12-03 PROCEDURE — 36415 COLL VENOUS BLD VENIPUNCTURE: CPT

## 2021-12-03 PROCEDURE — 80048 BASIC METABOLIC PNL TOTAL CA: CPT

## 2021-12-03 PROCEDURE — G0378 HOSPITAL OBSERVATION PER HR: HCPCS

## 2021-12-03 PROCEDURE — 83036 HEMOGLOBIN GLYCOSYLATED A1C: CPT

## 2021-12-03 PROCEDURE — 6360000002 HC RX W HCPCS: Performed by: PSYCHIATRY & NEUROLOGY

## 2021-12-03 PROCEDURE — 97535 SELF CARE MNGMENT TRAINING: CPT

## 2021-12-03 PROCEDURE — 96376 TX/PRO/DX INJ SAME DRUG ADON: CPT

## 2021-12-03 PROCEDURE — 99225 PR SBSQ OBSERVATION CARE/DAY 25 MINUTES: CPT | Performed by: INTERNAL MEDICINE

## 2021-12-03 PROCEDURE — 84443 ASSAY THYROID STIM HORMONE: CPT

## 2021-12-03 RX ORDER — ACETAMINOPHEN 325 MG/1
650 TABLET ORAL EVERY 6 HOURS PRN
Status: DISCONTINUED | OUTPATIENT
Start: 2021-12-03 | End: 2021-12-03 | Stop reason: HOSPADM

## 2021-12-03 RX ORDER — SODIUM CHLORIDE 9 MG/ML
25 INJECTION, SOLUTION INTRAVENOUS PRN
Status: DISCONTINUED | OUTPATIENT
Start: 2021-12-03 | End: 2021-12-03 | Stop reason: HOSPADM

## 2021-12-03 RX ORDER — POTASSIUM CHLORIDE 7.45 MG/ML
10 INJECTION INTRAVENOUS PRN
Status: DISCONTINUED | OUTPATIENT
Start: 2021-12-03 | End: 2021-12-03 | Stop reason: HOSPADM

## 2021-12-03 RX ORDER — DEXTROAMPHETAMINE SACCHARATE, AMPHETAMINE ASPARTATE, DEXTROAMPHETAMINE SULFATE AND AMPHETAMINE SULFATE 7.5; 7.5; 7.5; 7.5 MG/1; MG/1; MG/1; MG/1
30 TABLET ORAL DAILY
Status: DISCONTINUED | OUTPATIENT
Start: 2021-12-03 | End: 2021-12-03 | Stop reason: HOSPADM

## 2021-12-03 RX ORDER — SODIUM CHLORIDE 0.9 % (FLUSH) 0.9 %
5-40 SYRINGE (ML) INJECTION EVERY 12 HOURS SCHEDULED
Status: DISCONTINUED | OUTPATIENT
Start: 2021-12-03 | End: 2021-12-03 | Stop reason: HOSPADM

## 2021-12-03 RX ORDER — LEVOTHYROXINE SODIUM 0.03 MG/1
25 TABLET ORAL DAILY
Status: DISCONTINUED | OUTPATIENT
Start: 2021-12-03 | End: 2021-12-03 | Stop reason: HOSPADM

## 2021-12-03 RX ORDER — DIHYDROERGOTAMINE MESYLATE 1 MG/ML
1 INJECTION, SOLUTION INTRAMUSCULAR; INTRAVENOUS; SUBCUTANEOUS ONCE
Status: COMPLETED | OUTPATIENT
Start: 2021-12-03 | End: 2021-12-03

## 2021-12-03 RX ORDER — MAGNESIUM SULFATE 1 G/100ML
1000 INJECTION INTRAVENOUS PRN
Status: DISCONTINUED | OUTPATIENT
Start: 2021-12-03 | End: 2021-12-03 | Stop reason: HOSPADM

## 2021-12-03 RX ORDER — METOCLOPRAMIDE HYDROCHLORIDE 5 MG/ML
10 INJECTION INTRAMUSCULAR; INTRAVENOUS ONCE
Status: COMPLETED | OUTPATIENT
Start: 2021-12-03 | End: 2021-12-03

## 2021-12-03 RX ORDER — NICOTINE POLACRILEX 4 MG
15 LOZENGE BUCCAL PRN
Status: DISCONTINUED | OUTPATIENT
Start: 2021-12-03 | End: 2021-12-03 | Stop reason: HOSPADM

## 2021-12-03 RX ORDER — VENLAFAXINE 37.5 MG/1
37.5 TABLET ORAL 2 TIMES DAILY
Status: DISCONTINUED | OUTPATIENT
Start: 2021-12-03 | End: 2021-12-03 | Stop reason: HOSPADM

## 2021-12-03 RX ORDER — DEXTROSE MONOHYDRATE 50 MG/ML
100 INJECTION, SOLUTION INTRAVENOUS PRN
Status: DISCONTINUED | OUTPATIENT
Start: 2021-12-03 | End: 2021-12-03 | Stop reason: HOSPADM

## 2021-12-03 RX ORDER — ACETAMINOPHEN 650 MG/1
650 SUPPOSITORY RECTAL EVERY 6 HOURS PRN
Status: DISCONTINUED | OUTPATIENT
Start: 2021-12-03 | End: 2021-12-03 | Stop reason: HOSPADM

## 2021-12-03 RX ORDER — DEXTROSE MONOHYDRATE 25 G/50ML
12.5 INJECTION, SOLUTION INTRAVENOUS PRN
Status: DISCONTINUED | OUTPATIENT
Start: 2021-12-03 | End: 2021-12-03 | Stop reason: HOSPADM

## 2021-12-03 RX ORDER — ONDANSETRON 4 MG/1
4 TABLET, ORALLY DISINTEGRATING ORAL EVERY 8 HOURS PRN
Status: DISCONTINUED | OUTPATIENT
Start: 2021-12-03 | End: 2021-12-03 | Stop reason: HOSPADM

## 2021-12-03 RX ORDER — ROPINIROLE 2 MG/1
2 TABLET, FILM COATED ORAL 2 TIMES DAILY
Status: DISCONTINUED | OUTPATIENT
Start: 2021-12-03 | End: 2021-12-03 | Stop reason: HOSPADM

## 2021-12-03 RX ORDER — UBIDECARENONE 75 MG
100 CAPSULE ORAL DAILY
Status: DISCONTINUED | OUTPATIENT
Start: 2021-12-03 | End: 2021-12-03 | Stop reason: HOSPADM

## 2021-12-03 RX ORDER — LANOLIN ALCOHOL/MO/W.PET/CERES
3 CREAM (GRAM) TOPICAL NIGHTLY PRN
Status: DISCONTINUED | OUTPATIENT
Start: 2021-12-03 | End: 2021-12-03 | Stop reason: HOSPADM

## 2021-12-03 RX ORDER — SENNA AND DOCUSATE SODIUM 50; 8.6 MG/1; MG/1
1 TABLET, FILM COATED ORAL 2 TIMES DAILY
Status: DISCONTINUED | OUTPATIENT
Start: 2021-12-03 | End: 2021-12-03 | Stop reason: HOSPADM

## 2021-12-03 RX ORDER — POTASSIUM CHLORIDE 20 MEQ/1
40 TABLET, EXTENDED RELEASE ORAL PRN
Status: DISCONTINUED | OUTPATIENT
Start: 2021-12-03 | End: 2021-12-03 | Stop reason: HOSPADM

## 2021-12-03 RX ORDER — POLYETHYLENE GLYCOL 3350 17 G/17G
17 POWDER, FOR SOLUTION ORAL DAILY PRN
Status: DISCONTINUED | OUTPATIENT
Start: 2021-12-03 | End: 2021-12-03 | Stop reason: HOSPADM

## 2021-12-03 RX ORDER — ONDANSETRON 2 MG/ML
4 INJECTION INTRAMUSCULAR; INTRAVENOUS EVERY 6 HOURS PRN
Status: DISCONTINUED | OUTPATIENT
Start: 2021-12-03 | End: 2021-12-03 | Stop reason: HOSPADM

## 2021-12-03 RX ORDER — TOPIRAMATE 25 MG/1
100 TABLET ORAL 2 TIMES DAILY
Status: DISCONTINUED | OUTPATIENT
Start: 2021-12-03 | End: 2021-12-03 | Stop reason: HOSPADM

## 2021-12-03 RX ORDER — SODIUM CHLORIDE 0.9 % (FLUSH) 0.9 %
10 SYRINGE (ML) INJECTION PRN
Status: DISCONTINUED | OUTPATIENT
Start: 2021-12-03 | End: 2021-12-03 | Stop reason: HOSPADM

## 2021-12-03 RX ADMIN — METOCLOPRAMIDE 10 MG: 5 INJECTION, SOLUTION INTRAMUSCULAR; INTRAVENOUS at 11:10

## 2021-12-03 RX ADMIN — ACETAMINOPHEN 650 MG: 325 TABLET ORAL at 02:26

## 2021-12-03 RX ADMIN — SODIUM CHLORIDE 500 MG: 9 INJECTION, SOLUTION INTRAVENOUS at 11:10

## 2021-12-03 RX ADMIN — TOPIRAMATE 100 MG: 25 TABLET, FILM COATED ORAL at 02:41

## 2021-12-03 RX ADMIN — METOCLOPRAMIDE 10 MG: 5 INJECTION, SOLUTION INTRAMUSCULAR; INTRAVENOUS at 17:20

## 2021-12-03 RX ADMIN — POTASSIUM CHLORIDE 40 MEQ: 1500 TABLET, EXTENDED RELEASE ORAL at 02:26

## 2021-12-03 RX ADMIN — VITAM B12 100 MCG: 100 TAB at 08:23

## 2021-12-03 RX ADMIN — VENLAFAXINE 37.5 MG: 37.5 TABLET ORAL at 08:23

## 2021-12-03 RX ADMIN — LEVOTHYROXINE SODIUM 25 MCG: 0.03 TABLET ORAL at 06:58

## 2021-12-03 RX ADMIN — ONDANSETRON 4 MG: 2 INJECTION INTRAMUSCULAR; INTRAVENOUS at 11:10

## 2021-12-03 RX ADMIN — ENOXAPARIN SODIUM 30 MG: 100 INJECTION SUBCUTANEOUS at 08:30

## 2021-12-03 RX ADMIN — SODIUM CHLORIDE, PRESERVATIVE FREE 10 ML: 5 INJECTION INTRAVENOUS at 08:24

## 2021-12-03 RX ADMIN — TOPIRAMATE 100 MG: 25 TABLET, FILM COATED ORAL at 08:30

## 2021-12-03 RX ADMIN — DOCUSATE SODIUM 50MG AND SENNOSIDES 8.6MG 1 TABLET: 8.6; 5 TABLET, FILM COATED ORAL at 08:23

## 2021-12-03 RX ADMIN — ROPINIROLE HYDROCHLORIDE 2 MG: 2 TABLET, FILM COATED ORAL at 08:24

## 2021-12-03 RX ADMIN — Medication 3 MG: at 02:26

## 2021-12-03 RX ADMIN — DIHYDROERGOTAMINE MESYLATE 1 MG: 1 INJECTION INTRAMUSCULAR; INTRAVENOUS; SUBCUTANEOUS at 11:09

## 2021-12-03 RX ADMIN — DIHYDROERGOTAMINE MESYLATE 1 MG: 1 INJECTION INTRAMUSCULAR; INTRAVENOUS; SUBCUTANEOUS at 17:21

## 2021-12-03 RX ADMIN — VENLAFAXINE 37.5 MG: 37.5 TABLET ORAL at 02:39

## 2021-12-03 RX ADMIN — ROPINIROLE HYDROCHLORIDE 2 MG: 2 TABLET, FILM COATED ORAL at 02:39

## 2021-12-03 ASSESSMENT — PAIN DESCRIPTION - PROGRESSION
CLINICAL_PROGRESSION: NOT CHANGED
CLINICAL_PROGRESSION: GRADUALLY IMPROVING

## 2021-12-03 ASSESSMENT — PAIN DESCRIPTION - LOCATION
LOCATION: HEAD

## 2021-12-03 ASSESSMENT — ENCOUNTER SYMPTOMS
VOICE CHANGE: 1
PHOTOPHOBIA: 0
WHEEZING: 0
COLOR CHANGE: 0
VOMITING: 0
NAUSEA: 0
TROUBLE SWALLOWING: 0
CONSTIPATION: 0
CHEST TIGHTNESS: 0
COUGH: 0
DIARRHEA: 0
ABDOMINAL PAIN: 0
NAUSEA: 1
CONSTIPATION: 1
SHORTNESS OF BREATH: 0
BACK PAIN: 1
BLOOD IN STOOL: 0

## 2021-12-03 ASSESSMENT — PAIN DESCRIPTION - PAIN TYPE
TYPE: ACUTE PAIN

## 2021-12-03 ASSESSMENT — PAIN SCALES - GENERAL
PAINLEVEL_OUTOF10: 5
PAINLEVEL_OUTOF10: 4
PAINLEVEL_OUTOF10: 8
PAINLEVEL_OUTOF10: 0
PAINLEVEL_OUTOF10: 8
PAINLEVEL_OUTOF10: 7
PAINLEVEL_OUTOF10: 5

## 2021-12-03 ASSESSMENT — PAIN DESCRIPTION - DESCRIPTORS
DESCRIPTORS: ACHING;THROBBING
DESCRIPTORS: ACHING;THROBBING

## 2021-12-03 ASSESSMENT — PAIN DESCRIPTION - ONSET
ONSET: ON-GOING
ONSET: ON-GOING

## 2021-12-03 ASSESSMENT — PAIN DESCRIPTION - FREQUENCY
FREQUENCY: INTERMITTENT
FREQUENCY: INTERMITTENT

## 2021-12-03 NOTE — PROGRESS NOTES
Physical Therapy    Facility/Department: [facility] CVICU  Initial Assessment    NAME: Elton Stoddard  : 1975  MRN: [id number]    Date of Service: 12/3/2021    Discharge Recommendations:           Assessment   Assessment: Pt safe and independent w/ mobility, no need for further PT intervention  Prognosis: Excellent  PT Education: General Safety; PT Role  Activity Tolerance  Activity Tolerance: Patient Tolerated treatment well       Patient Diagnosis(es): The encounter diagnosis was Intractable migraine without aura and without status migrainosus. has a past medical history of Balance problem, COVID-19 virus infection, Dizziness, History of depression, Hypothyroidism, Migraines, Multiple sclerosis ([facility] [address] Restless leg syndrome, and Sleep apnea. has a past surgical history that includes Hysterectomy; Cholecystectomy; Varicose vein surgery (); Carpal tunnel release (Left, 10-); Carpal tunnel release (Right, 10-); Thoracic spine surgery; back surgery (2021); and laminectomy (N/A, 2021).     Restrictions  Restrictions/Precautions  Restrictions/Precautions: General Precautions  Required Braces or Orthoses?: No  Vision/Hearing        Subjective  General  Patient assessed for rehabilitation services?: Yes  Response To Previous Treatment: Not applicable  Family / Caregiver Present: No  Follows Commands: Within Functional Limits  General Comment  Comments: OK for PT per [facility] RN  Pain Screening  Patient Currently in Pain: Yes  Pain Assessment  Pain Assessment: 0-10  Pain Level: 8  Pain Type: Acute pain  Pain Location: Head (Headache)  Vital Signs  Patient Currently in Pain: Yes       Orientation  Orientation  Overall Orientation Status: Within Normal Limits  Social/Functional History  Social/Functional History  Lives With: Spouse, Daughter  Type of Home: House  Home Layout: One level  Home Access: Stairs to enter without rails  Entrance Stairs - Number of Steps: 2  Bathroom Shower/Tub: Walk-in shower  Bathroom Toilet: Standard  Bathroom Equipment: Shower chair  Home Equipment: Rolling walker, Cane  ADL Assistance: Independent  Homemaking Assistance: Independent  Ambulation Assistance: Independent  Transfer Assistance: Independent  Active : Yes  Mode of Transportation: Car  Type of occupation:  (works partially from home)  [address]: [place]  Additional Comments: h.o. falls d/t balance issues from [address]. None in last year. Cognition   Cognition  Overall Cognitive Status: WNL    Objective     Observation/Palpation  Posture: Good    AROM RLE (degrees)  RLE AROM: WNL  AROM LLE (degrees)  LLE AROM : WNL  AROM RUE (degrees)  RUE General AROM: See OT eval for B UE ROM  Strength RLE  Strength RLE: WFL  Comment: B UE's 5/5, minimal tremor at B ankles w/ initial resistance  Strength LLE  Strength LLE: WFL  Strength RUE  Comment: See OT eval for B UE MMT  Tone RLE  RLE Tone: Normotonic  Tone LLE  LLE Tone: Normotonic  Motor Control  Gross Motor?: WNL     Bed mobility  Rolling to Right: Independent  Supine to Sit: Independent  Scooting: Independent  Transfers  Sit to Stand: Independent  Stand to sit: Independent  Stand Pivot Transfers: Independent  Ambulation  Ambulation?: Yes  Ambulation 1  Surface: level tile  Device: No Device  Assistance: Independent  Gait Deviations: None  Distance: 45' x 1  Comments:  To chair  Stairs/Curb  Stairs?: No     Balance  Posture: Good  Sitting - Static: Good  Sitting - Dynamic: Good  Standing - Static: Good  Standing - Dynamic: Good        Plan   Plan  Times per week: No further PT intervention required at this time  Safety Devices  Type of devices: Gait belt, Left in chair, Call light within reach, Nurse notified  Restraints  Initially in place: No    G-Code       OutComes Score  Balance Score: 16 (12/03/21 1208)  Gait Score: 12 (12/03/21 1208)        Tinetti Total Score: 28 (12/03/21 1208)                                   AM-PAC Score  AM-PAC Inpatient Mobility Raw Score : 24 (12/03/21 1207)  AM-PAC Inpatient T-Scale Score : 61.14 (12/03/21 1207)  Mobility Inpatient CMS 0-100% Score: 0 (12/03/21 1207)  Mobility Inpatient CMS G-Code Modifier : [facility] (12/03/21 1207)          Goals  Short term goals  Time Frame for Short term goals: No further PT intervention required at this time  Patient Goals   Patient goals : Home ASAP       Therapy Time   Individual Concurrent Group Co-treatment   Time In 0848 (12 minutes treatment time)         Time Out [address] [facility] [address] [facility], [address] [facility]

## 2021-12-03 NOTE — CARE COORDINATION
Case Management Initial Discharge Plan  Kelly Bell,             Met with:patient to discuss discharge plans. Information verified: address, contacts, phone number, , insurance Yes  Insurance Provider: [facility]    Emergency Contact/Next of Koby Cooper name & number: spouse/Alvarez   [phone]  Who are involved in patient's support system? family    PCP: QIAN Jarquin CNP  Date of last visit: 1 month      Discharge Planning    Living Arrangements:        Home has 1 stories  1 stairs to climb to get into front door, 0stairs to climb to reach second floor  Location of bedroom/bathroom in home main    Patient able to perform ADL's:Independent    Current Services (outpatient & in home) none  DME equipment: cane  DME provider: -    Is patient receiving oral anticoagulation therapy? No    If indicated:   Physician managing anticoagulation treatment:   Where does patient obtain lab work for ATC treatment? Potential Assistance Needed:       Patient agreeable to home care: No  [place] of choice provided:  n/a    Prior SNF/Rehab Placement and Facility: n/a  Agreeable to SNF/Rehab: No  [place] of choice provided: n/a     Evaluation: no    Expected Discharge date:       Patient expects to be discharged to: If home: is the family and/or caregiver wiling & able to provide support at home? yes  Who will be providing this support? Self and family    Follow Up Appointment: Best Day/ Time:      Transportation provider: spouse  Transportation arrangements needed for discharge: No    Readmission Risk              Risk of Unplanned Readmission:  0             Does patient have a readmission risk score greater than 14?: No  If yes, follow-up appointment must be made within 7 days of discharge.      Goals of Care:       Educated patient on transitional options, provided freedom of choice and are agreeable with plan      Discharge Plan: Migraine  Patient lives with spouse in a 1 story home with 1 step to enter. Declines any skilled needs. Independent. [facility] is [facility] on [address] to follow for any needs. Has transport home. Probable discharge today.           Electronically signed by Ferny Gamez RN on 12/3/21 at 12:14 PM EST

## 2021-12-03 NOTE — CONSULTS
[id number] [facility] Neurology   IN-PATIENT SERVICE      NEUROLOGY CONSULT  NOTE            Date:   12/3/2021  Patient name:  Matilde Escobar  Date of admission:  12/2/2021  YOB: 1975      Chief Complaint:     Chief Complaint   Patient presents with    Migraine     Sent by neurologist for MS flare up; migraine x1 week without relief       Reason for Consult:      Management of MS flare and migraine    History of Present Illness: The patient is a 40 y.o. female who presents with Migraine (Sent by neurologist for MS flare up; migraine x1 week without relief)  . The patient was seen and examined and the chart was reviewed. Patient is right handed. This patient states that she has had multiple sclerosis since 2015. She is currently 40years old. She has had the problem for 5 years. Her initial diagnosis included MRIs of the brain cervical and lumbar spine which were apparently unimpressive but confirmed by a lumbar puncture which was positive for multiple sclerosis. She was initially treated with Copaxone. Recently she was started on Mavenclad (cladribine) which is a twice a year infusion of a disease modifying drug. Her last infusion was in August.    Her current MS flare consist of a hoarse voice. She states that she \"lost her voice\". She does not mean that she became mute but she does mean that she showed signs and symptoms similar to laryngitis. She initially saw her PCP who thought it was a viral laryngitis but it cleared. That onset was about 3 weeks ago. She did get better but then had a recurrent event which has been thought to be an MS flare. She has had no other symptoms such as loss of vision double vision slurring of speech numbness or paresthesia of the body weakness. She has normal control of bowel and bladder. She has been treated in the past with Solu-Medrol  mg daily x3 which is standard therapy for MS flares.   She indicates that Solu-Medrol can give her headache. The main reason for admission to the hospital at this time is migraine. She had been taking Aimovig for migraine with good control. She may have had one migraine every 3 months or so. She also takes Nurtec and topiramate for her migraines. When she has flares she sometimes receives DHE for migraines. She was referred in for DHE infusion. Her outside treating neurologist does not have hospital privileges and so he told her to proceed to the emergency room for evaluation and treatment. Patient did receive DHE 1 mg IV x2 yesterday without benefit. Patient also received 1000 mg of magnesium sulfate IV yesterday. She also received some Benadryl. She has been approved for Botox injections for management of headache. She states that her headaches persist and level of 8/10 at this time. She states this is typical for her migraine symptoms. Patient also has prior thoracic spine surgery with placement of rods. She apparently had a fall in the past and resection of a thoracic disc. A second surgery was performed for placement of rods when she became symptomatic again. She feels the surgery did help. Surgery has been within the past 6 months.       Past Medical History:     Past Medical History:   Diagnosis Date    Balance problem     COVID-19 virus infection 02/2021    Dizziness     History of depression     Hypothyroidism     Migraines     Multiple sclerosis (HCC)     Restless leg syndrome     Sleep apnea     patient does not wear CPAP        Past Surgical History:     Past Surgical History:   Procedure Laterality Date    BACK SURGERY  08/23/2021    T10-11 posterior lateral fusion    CARPAL TUNNEL RELEASE Left 10-    CARPAL TUNNEL RELEASE Right 10-    CHOLECYSTECTOMY      HYSTERECTOMY      LAMINECTOMY N/A 8/23/2021    T10- T11  POSTERIOR LATERAL    FUSION  & INSTRUMENTATION    WITH NEUROMONITORING   EVOKES    NUVASIVE  2 C-ARM performed by Candelaria Krueger MD at [address] [address]        Medications Prior to Admission:     Prior to Admission medications    Medication Sig Start Date End Date Taking? Authorizing Provider   sennosides-docusate sodium (SENOKOT-S) 8.6-50 MG tablet Take 1 tablet by mouth 2 times daily 8/24/21  Yes Inez Healy MD   tiZANidine (ZANAFLEX) 4 MG tablet Take 1 tablet by mouth every 8 hours as needed (spasm) 8/24/21  Yes Inez Healy MD   amphetamine-dextroamphetamine (ADDERALL) 30 MG tablet Take 30 mg by mouth daily. XR 30mg   Yes Historical Provider, MD   venlafaxine (EFFEXOR) 37.5 MG tablet Take 37.5 mg by mouth 2 times daily   Yes Historical Provider, MD   topiramate (TOPAMAX) 100 MG tablet Take 100 mg by mouth 2 times daily   Yes Historical Provider, MD   rOPINIRole (REQUIP) 2 MG tablet Take 2 mg by mouth 2 times daily   Yes Historical Provider, MD   Amivantamab-vmjw 350 MG/7ML SOLN Infuse 7 mLs intravenously every 30 days   Yes Historical Provider, MD   levothyroxine (SYNTHROID) 25 MCG tablet Take 25 mcg by mouth Daily  8/26/14  Yes Historical Provider, MD   vitamin B-12 (CYANOCOBALAMIN) 100 MCG tablet Take 100 mcg by mouth daily    Yes Historical Provider, MD        Allergies:     Pcn [penicillins]    Social History:     Tobacco:    reports that she has never smoked. She has never used smokeless tobacco.  Alcohol:      reports no history of alcohol use. Drug Use:  reports no history of drug use. Family History:     Family History   Problem Relation Age of Onset    Heart Attack Father        Review of Systems:       Constitutional Negative for fever and chills   HEENT Negative for ear discharge, ear pain, nosebleed. Negative for photophobia   Musculoskeletal Negative for joint pain, negative for myalgia   Respiratory Negative for cough, dyspnea. Negative for hemoptysis and sputum. Cardiovascular Negative for palpitations, chest pain. .    Gastrointestinal Negative for vomiting. Negative for abdominal pain, diarrhea, blood in stool she may have some nausea associated with her headaches. Genitourinary  Negative for dysuria, hematuria. Negative for bladder incontinence. Skin Negative for rash or itching   Hematology Negative for ecchymosis, anemia   Psychiatric Negative for anxiety, depression. Negative for suicidal ideation, hallucinations         Physical Exam:   BP (!) 116/59   Pulse 95   Temp 97.2 °F (36.2 °C) (Oral)   Resp 18   Ht 5' 8\" (1.727 m)   Wt 240 lb (108.9 kg)   LMP 10/16/2005   SpO2 97%   BMI 36.49 kg/m²   Temp (24hrs), Av.4 °F (36.3 °C), Min:97.2 °F (36.2 °C), Max:97.6 °F (36.4 °C)    General examination:      General Appearance:  alert, well appearing, and in no acute distress  HEENT: Normocephalic, atraumatic  Neck: supple  Extremities:  peripheral pulses palpable, no cyanosis, clubbing or edema  Psych: normal affect    Neurological examination:    Mental status   Alert and oriented x 3; following all commands; speech is fluent, no dysarthria, aphasia. Memory intact. Language shows normal reception expression repetition and naming. Insight and judgment is normal.     Cranial nerves   II - visual fields intact to confrontation; pupils reactive. Pupil 4 mm  III, IV, VI - extraocular muscles intact; no SKYLER; no nystagmus; no ptosis   V - normal facial sensation                                                               VII - normal facial symmetry                                                             VIII - intact hearing                                                                             IX, X -phonation shows mild hoarseness which seems slightly variable. Swallowing intact                                              XI - symmetrical head turning                                                      XII - midline tongue      Motor function  Strength: 5/5 RUE, 5/5 RLE, 5/5 LUE, 5/5  LLE  Normal bulk and tone.    No tremors

## 2021-12-03 NOTE — PLAN OF CARE
Problem: Falls - Risk of:  Goal: Will remain free from falls  Description: Will remain free from falls  12/3/2021 1270 by Norris Mann RN  Outcome: Ongoing  12/3/2021 0831 by Norris Mann RN  Outcome: Ongoing  Goal: Absence of physical injury  Description: Absence of physical injury  12/3/2021 0832 by Norris Mann RN  Outcome: Ongoing  12/3/2021 0831 by Norris Mann RN  Outcome: Ongoing     Problem: Pain:  Goal: Pain level will decrease  Description: Pain level will decrease  Outcome: Ongoing  Goal: Control of acute pain  Description: Control of acute pain  Outcome: Ongoing  Goal: Control of chronic pain  Description: Control of chronic pain  Outcome: Ongoing

## 2021-12-03 NOTE — H&P
[facility]  Office: [address], DO, Katrina Sarah, DO, Simona Silvestre, DO, Harlan Carlsonl Blood, DO, Cherie Jackson MD, Lenora Torres MD, Claudio Delgado MD, Deb Mcwilliams MD, Osmani Allen MD, Anne Marie Blunt MD, Kayla Lopez MD, Graeme Oneil, DO, James Taveras, DO, Rose Red MD,  Lyndsey Griffin DO, Sherrell Griffith MD, Toni Amin MD, Mitch Monteiro MD, Kendal Monzon MD, Yordy Elizabeth MD, Nereyda Ceja MD, Tori Laguna MD, Shyam Oliva [facility], [facility], [facility], Renetta Geiger, [facility], Cande العلي, CNS, Dmitriy Dean, CNP, Maria Luisa Rojas, CNP, Mary Ellen Jewell, CNP, Nata Carrion, CNP, Rudolph Santoro, CNP, Yanick Cummings PA-C, Tera Peña, [facility], Mickie Garrido, CNP, Oscar Bond, CNP, Hari Kowalski, CNP, Robel Gomez, CNP, Gardenia King, CNP, Manuel Salazar, CNP, Dany Walker, CNP         [facility]    HISTORY AND PHYSICAL EXAMINATION            Date:   12/2/2021  Patient name:  Yazmin Mendes  Date of admission:  12/2/2021  5:00 PM  MRN:   [id number]  Account:  [de-identified]  YOB: 1975  PCP:    QIAN Castillo CNP  Room:   [place] [address]  Code Status:    Prior    Chief Complaint:     Chief Complaint   Patient presents with    Migraine     Sent by neurologist for MS flare up; migraine x1 week without relief     History Obtained From:     Patient and electronic medical record. History of Present Illness:     Yazmin Mendes is a 40 y.o. Non- / non  female who presents with Migraine (Sent by neurologist for MS flare up; migraine x1 week without relief)   and is admitted to the hospital for the management of Migraine variant, intractable. The patient presents to the hospital with complaint of multiple sclerosis flare. She states that a few weeks ago she devloped laryngitis. She visited her PCP and was found to have a UTI and prescribed antibiotics.  She states that about 1 week ago she Pulses: Normal pulses. Heart sounds: Normal heart sounds. No murmur heard. Pulmonary:      Effort: Pulmonary effort is normal.      Breath sounds: Normal breath sounds. No decreased breath sounds. Abdominal:      General: Bowel sounds are normal. There is no distension. Palpations: Abdomen is soft. There is no mass. Tenderness: There is no abdominal tenderness. There is no guarding. Musculoskeletal:         General: No tenderness. Cervical back: Neck supple. Skin:     General: Skin is warm and dry. Neurological:      Mental Status: She is alert and oriented to person, place, and time. GCS: GCS eye subscore is 4. GCS verbal subscore is 5. GCS motor subscore is 6. Cranial Nerves: No cranial nerve deficit or facial asymmetry.       Coordination: Finger-Nose-Finger Test and Heel to [employer] normal.   Psychiatric:         Speech: Speech normal.         Behavior: Behavior normal.         Investigations:      Laboratory Testing:  Recent Results (from the past 24 hour(s))   Basic Metabolic Panel w/ Reflex to MG    Collection Time: 12/02/21  5:30 PM   Result Value Ref Range    Glucose 93 70 - 99 mg/dL    BUN 17 6 - 20 mg/dL    CREATININE 0.61 0.50 - 0.90 mg/dL    Bun/Cre Ratio 28 (H) 9 - 20    Calcium 8.9 8.6 - 10.4 mg/dL    Sodium 139 135 - 144 mmol/L    Potassium 3.5 (L) 3.7 - 5.3 mmol/L    Chloride 107 98 - 107 mmol/L    CO2 21 20 - 31 mmol/L    Anion Gap 11 9 - 17 mmol/L    GFR Non-African American >60 >60 mL/min    GFR African American >60 >60 mL/min    GFR Comment          GFR Staging NOT REPORTED    CBC Auto Differential    Collection Time: 12/02/21  5:30 PM   Result Value Ref Range    WBC 5.7 3.5 - 11.3 k/uL    RBC 4.92 3.95 - 5.11 m/uL    Hemoglobin 15.2 (H) 11.9 - 15.1 g/dL    Hematocrit 45.2 36.3 - 47.1 %    MCV 91.9 82.6 - 102.9 fL    MCH 30.9 25.2 - 33.5 pg    MCHC 33.6 28.4 - 34.8 g/dL    RDW 13.2 11.8 - 14.4 %    Platelets 511 320 - 603 k/uL    MPV 9.5 8.1 - 13.5 fL NRBC Automated 0.0 0.0 per 100 WBC    Differential Type NOT REPORTED     Seg Neutrophils 64 36 - 65 %    Lymphocytes 17 (L) 24 - 43 %    Monocytes 12 3 - 12 %    Eosinophils % 5 (H) 1 - 4 %    Basophils 1 0 - 2 %    Immature Granulocytes 1 (H) 0 %    Segs Absolute 3.70 1.50 - 8.10 k/uL    Absolute Lymph # 0.99 (L) 1.10 - 3.70 k/uL    Absolute Mono # 0.68 0.10 - 1.20 k/uL    Absolute Eos # 0.28 0.00 - 0.44 k/uL    Basophils Absolute 0.04 0.00 - 0.20 k/uL    Absolute Immature Granulocyte 0.05 0.00 - 0.30 k/uL    WBC Morphology NOT REPORTED     RBC Morphology NOT REPORTED     Platelet Estimate NOT REPORTED    Magnesium    Collection Time: 12/02/21  5:30 PM   Result Value Ref Range    Magnesium 2.2 1.6 - 2.6 mg/dL       Imaging/Diagnostics:  No results found. Assessment :      Hospital Problems           Last Modified POA    * (Principal) Migraine variant, intractable 12/2/2021 Yes    Multiple sclerosis ([facility] [address] 12/2/2021 Yes    Hypothyroidism 12/2/2021 Yes    Class 2 obesity due to excess calories with body mass index (BMI) of 36.0 to 36.9 in adult 12/2/2021 Yes    LAWRENCE (obstructive sleep apnea) 12/2/2021 Yes        Plan:     Patient status observation in the  Med/Surge    1. Neurology consulted in ER. However the patient's neurologist does not have hospital privileges. Will consult [facility]. 2. Patient given DHE, solumedrol, benadryl and magnesium sulfate in emergency department. 3. MRI brain. 4. Neuro checks q4h.   5. Hypothyroidism- check TSH, continue levothyroxine. 6. LAWRENCE- does not use cpap. Supplemental oxygen as needed. 7. Obesity- lifestyle modifications. 8. DVT prophylaxis. 9. Monitor vital signs. 10. Follow chemistries. 11. General diet. 12. Activity as tolerated. Plan of care discussed with patient.      Consultations:   IP CONSULT TO INTERNAL MEDICINE  IP CONSULT TO NEUROLOGY  IP CONSULT TO NEUROLOGY        Saadia Juarez, QIAN - CNP  12/2/2021  10:54 PM    Copy sent to  Isaura Meza, APRN - CNP

## 2021-12-03 NOTE — FLOWSHEET NOTE
12/03/21 0853   Provider Notification   Reason for Communication Review case   Provider Name Dr Tonja Adame   Provider Notification Physician   Method of Communication Face to face   Response Other (Comment)     Per Neurology - Primary to address steriod and elevated glucose. MRI cancelled pt had recent MRI o/p. Treat headache.

## 2021-12-03 NOTE — PROGRESS NOTES
Patient arrived on unit via stretcher from the ED. Patient admitted for migraines x 1 week. Patient has MS and states her MS is flaring up again causing her to have migraines. Patient orient to room and call light. Will continue to monitor.

## 2021-12-03 NOTE — PROGRESS NOTES
[facility]  Office: [address], DO, lAice Press, DO, Lesly Cushing, DO, Feng Fortune Blood, DO, Darnell Ventura MD, Leah Rodriges MD, Samson Hightower MD, Princess Otoole MD, Niurka Cardenas MD, Donna Yates MD, Verónica Chavez MD, Randolph Kaplan, DO, Nadine Schulte, DO, Gabriel Moe MD,  Gena Siemens, DO, Kade Diallo MD, Macarena Pierre MD, Maki Starr MD, Breonna Renteria MD, Nnamdi Randle MD, Diandra Dominguez MD, Heladio Servin MD, Dayana Kessler, [facility], [facility], CNP, Derrick Cruz, CNP, Dennis Bonilla, CNS, Robina Gil, CNP, Bayron Wesley, CNP, Bonny Fitzpatrick, CNP, Vincent Lopez, CNP, Niki Boone, CNP, Roylene Goodell, PA-C, Dennis Engle, [facility], Jeremie Jacinto, CNP, Wendi Pearson, CNP, Janny Mendoza, CNP, Dolores Gonzales, CNP, Megan Flores, CNP, Gregg Ca, [facility], Steffen Parrish [address]    Progress Note    12/3/2021    4:04 PM    Name:   Wili Long  MRN:     [id number]     Acct:      [de-identified]   Room:   2041/2041-01   Day:  0  Admit Date:  12/2/2021  5:00 PM    PCP:   QIAN Garsia CNP  Code Status:  Full Code    Subjective:     C/C:   Chief Complaint   Patient presents with    Migraine     Sent by neurologist for MS flare up; migraine x1 week without relief     Interval History Status:   Somewhat improved  Still reporting a bifrontal throbbing headache  Pain rated 8/10  No visual change  No focal neurologic deficits    Data Base Updates:  WBC4.9k/uL RBC5.20 High m/uL Gwozhamdez71.1 High      Edgzwkh603 High mg/dL     KOL64lr/dL   CREATININE0.70     TSH0.60       Brief History:     As documented in the medical record:  Swapna Campbell is a 40 y.o. Non- / non  female who presents with Migraine (Sent by neurologist for MS flare up; migraine x1 week without relief)   and is admitted to the hospital for the management of Migraine variant, intractable.      The patient presents to the hospital with complaint of multiple sclerosis flare. She states that a few weeks ago she devloped laryngitis. She visited her PCP and was found to have a UTI and prescribed antibiotics. She states that about 1 week ago she developed a frontal migraine that she describes as constant, pressure like and severe in intensity. She does not have photo or phono sensitivity. She states this is typical for her migraines. She endorses feeling weak, fatigued and has a decreased appetite. She has nausea without vomiting. She states that she had double vision that was isolated to night time and that this has resolved. She visited her neurologist who suspected she was having a multiple sclerosis flare and was coordinating outpatient treatment with DHE and steroids. She states her symptoms were persistent and she contacted her neurologist who advised her to come to the emergency department. She denies shortness of breath, chest pain, difficulty speaking or unilateral weakness. No additional symptomology or modifying factors. She has past medical history that includes multiple sclerosis, migraines, hypothyroidism and LAWRENCE (does not use cpap). She had a back surgery at the end of August 2021 and endorses chronic back pain. She is fully vaccinated against COVID-19. She follows outpatient with TIMO Agudelo CNP (DR Reyes Geiger) with neurology. \"    The intitial plan included:  1. \"Neurology consulted in ER. However the patient's neurologist does not have hospital privileges. Will consult [facility]. 2. Patient given DHE, solumedrol, benadryl and magnesium sulfate in emergency department. 3. MRI brain. 4. Neuro checks q4h.   5. Hypothyroidism- check TSH, continue levothyroxine. 6. LAWRENCE- does not use cpap. Supplemental oxygen as needed. 7. Obesity- lifestyle modifications. 8. DVT prophylaxis. 9. Monitor vital signs. 10. Follow chemistries. 11. General diet. 12. Activity as tolerated.  \"       Past Medical History:   has a past medical history of Balance problem, COVID-19 virus infection, Dizziness, History of depression, Hypothyroidism, Migraines, Multiple sclerosis ([address] Restless leg syndrome, and Sleep apnea. Social History:   reports that she has never smoked. She has never used smokeless tobacco. She reports that she does not drink alcohol and does not use drugs. Family History:   Family History   Problem Relation Age of Onset    Heart Attack Father        Review of Systems:     Review of Systems   Constitutional: Positive for activity change (Diminished) and appetite change (Decreased). HENT: Positive for voice change (Hoarse at times which she attributes to her MS). Negative for trouble swallowing. Respiratory: Negative for cough and shortness of breath. Cardiovascular: Negative for chest pain and palpitations. Gastrointestinal: Positive for constipation. Negative for abdominal pain, nausea and vomiting. Genitourinary: Negative for difficulty urinating (No significant urologic symptomatology with her MS), flank pain and hematuria. Musculoskeletal: Positive for back pain and gait problem (Sometimes unsteady). The patient has had chronic back pain. She has had degenerative disc disease from prior trauma necessitating surgery   Neurological: Positive for dizziness (Occasional, attributed to her MS) and headaches (See chief complaint). Negative for speech difficulty and weakness. Psychiatric/Behavioral: Positive for confusion (reports intermittent memory loss). The patient is nervous/anxious (has not slept for \"days\"). Physical Examination:        Vitals  BP (!) 116/59   Pulse 99   Temp 97 °F (36.1 °C) (Temporal)   Resp 18   Ht 5' 8\" (1.727 m)   Wt 240 lb (108.9 kg)   LMP 10/16/2005   SpO2 95%   BMI 36.49 kg/m²   Temp (24hrs), Av.3 °F (36.3 °C), Min:97 °F (36.1 °C), Max:97.6 °F (36.4 °C)    No results for input(s): POCGLU in the last 72 hours. Physical Exam  Vitals reviewed.    Constitutional: General: She is not in acute distress. Appearance: She is not diaphoretic. HENT:      Head: Normocephalic. Nose: Nose normal.   Eyes:      General: No scleral icterus. Conjunctiva/sclera: Conjunctivae normal.   Neck:      Trachea: No tracheal deviation. Cardiovascular:      Rate and Rhythm: Normal rate and regular rhythm. Pulmonary:      Effort: Pulmonary effort is normal. No respiratory distress. Breath sounds: Normal breath sounds. No wheezing or rales. Chest:      Chest wall: No tenderness. Abdominal:      General: There is no distension. Palpations: Abdomen is soft. Tenderness: There is no abdominal tenderness. Musculoskeletal:         General: No tenderness. Cervical back: Neck supple. Skin:     General: Skin is warm and dry. Neurological:      General: No focal deficit present. Mental Status: She is alert. Medications: Allergies: Allergies   Allergen Reactions    Pcn [Penicillins] Rash       Current Meds:   Scheduled Meds:    topiramate  100 mg Oral BID    venlafaxine  37.5 mg Oral BID    vitamin B-12  100 mcg Oral Daily    rOPINIRole  2 mg Oral BID    levothyroxine  25 mcg Oral Daily    sennosides-docusate sodium  1 tablet Oral BID    amphetamine-dextroamphetamine  30 mg Oral Daily    sodium chloride flush  5-40 mL IntraVENous 2 times per day    enoxaparin  30 mg SubCUTAneous BID    insulin lispro  0-6 Units SubCUTAneous TID WC    insulin lispro  0-3 Units SubCUTAneous Nightly     Continuous Infusions:    sodium chloride      dextrose       PRN Meds: sodium chloride flush, sodium chloride, potassium chloride **OR** potassium alternative oral replacement **OR** potassium chloride, magnesium sulfate, ondansetron **OR** ondansetron, polyethylene glycol, acetaminophen **OR** acetaminophen, melatonin, glucose, dextrose, glucagon (rDNA), dextrose    Data:     I/O (24Hr):     Intake/Output Summary (Last 24 hours) at 12/3/2021 [address] filed at 12/3/2021 0659  Gross per 24 hour   Intake 300 ml   Output --   Net 300 ml       Labs:  Hematology:  Recent Labs     12/02/21  1730 12/03/21  0446   WBC 5.7 4.9   RBC 4.92 5.20*   HGB 15.2* 16.1*   HCT 45.2 48.3*   MCV 91.9 92.9   MCH 30.9 31.0   MCHC 33.6 33.3   RDW 13.2 12.9    281   MPV 9.5 9.5   INR  --  1.0     Chemistry:  Recent Labs     12/02/21  1730 12/03/21  0446    140   K 3.5* 4.1    109*   CO2 21 19*   GLUCOSE 93 208*   BUN 17 14   CREATININE 0.61 0.70   MG 2.2  --    ANIONGAP 11 12   LABGLOM >60 >60   GFRAA >60 >60   CALCIUM 8.9 9.0     Recent Labs     12/03/21  0446   TSH 0.60     ABG:No results found for: POCPH, PHART, PH, POCPCO2, BIQ9VIT, PCO2, POCPO2, PO2ART, PO2, POCHCO3, ASG0IRN, HCO3, NBEA, PBEA, BEART, BE, THGBART, THB, XWZ0BSN, DRPP0KKG, R5YYORCV, O2SAT, FIO2  Lab Results   Component Value Date/Time    SPECIAL NOT REPORTED 08/23/2021 10:15 AM     Lab Results   Component Value Date/Time    CULTURE NO GROWTH 08/23/2021 10:15 AM       Radiology:  No results found.     Assessment:        Primary Problem  Migraine variant, intractable    Active Hospital Problems    Diagnosis Date Noted    Steroid-induced hyperglycemia [R73.9, T38.0X5A] 12/03/2021    Migraine variant, intractable [G43.819]     Multiple sclerosis (HCC) [G35]     Hypothyroidism [E03.9]     Class 2 obesity due to excess calories with body mass index (BMI) of 36.0 to 36.9 in adult [E66.09, Z68.36]     LAWRENCE (obstructive sleep apnea) [G47.33]        Plan:        Pain management  Neurology evaluation  DHE  Solumedrol  Reglan   The case has been reviewed with Dr. Johnson Zaomra  Neurology follow-up Dr. Jayce Gant  Glycemic contol - monitor and control blood sugars  Check A1c  Low Carb diet   DVT prophylaxis    IP CONSULT TO INTERNAL MEDICINE  IP CONSULT TO NEUROLOGY  IP CONSULT TO NEUROLOGY    Pratima Goldman DO  12/3/2021  4:04 PM

## 2021-12-03 NOTE — PROGRESS NOTES
Occupational Therapy   Occupational Therapy Initial Assessment  Date: 12/3/2021   Patient Name: Yazmin Mendes  MRN: [id number]     : 1975    Date of Service: 12/3/2021    Discharge Recommendations: Home Independently  RN reports patient is medically stable for therapy treatment this date. Chart reviewed prior to treatment and patient is agreeable for therapy. All lines intact and patient positioned comfortably at end of treatment. All patient needs addressed prior to ending therapy session. The patient is a 76-year-old female with a history of MS as well as migraine headaches who presented to the emergency department secondary to headache. Patient complains of greater than 1 week history of a migraine headache not receptive to her home medications. She was evaluated by a neurologist yesterday with recommendation to come to the emergency department to receive steroids as well as DHE however patient decided to go home and come in today. She is complain of a headache and out of 10 on the pain scale localized to the front of her head consistent nature with previous migraine headaches. Of note, patient underwent back surgery 1 week ago she has pain in her back with no new injury or trauma. She denied loss of bladder or bowel urinary retention. Patient denied chest pain, shortness of breath, nausea, vomiting, fevers or chills       Assessment   Performance deficits / Impairments: Decreased endurance  Assessment: no further OT warranted, pt is independent and verb good understanding of education  Prognosis: Good  Decision Making: Low Complexity  OT Education: OT Role; ADL Adaptive Strategies;  Energy Conservation  No Skilled OT: Safe to return home  REQUIRES OT FOLLOW UP: No  Activity Tolerance  Activity Tolerance: Patient Tolerated treatment well  Safety Devices  Safety Devices in place: Yes  Type of devices: Call light within reach; Nurse notified; Gait belt; Left in chair           Patient Diagnosis(es): The encounter diagnosis was Intractable migraine without aura and without status migrainosus. has a past medical history of Balance problem, COVID-19 virus infection, Dizziness, History of depression, Hypothyroidism, Migraines, Multiple sclerosis ([address] Restless leg syndrome, and Sleep apnea. has a past surgical history that includes Hysterectomy; Cholecystectomy; Varicose vein surgery (2001); Carpal tunnel release (Left, 10-); Carpal tunnel release (Right, 10-); Thoracic spine surgery; back surgery (08/23/2021); and laminectomy (N/A, 8/23/2021). Restrictions  Restrictions/Precautions  Restrictions/Precautions: General Precautions  Required Braces or Orthoses?: No    Subjective   General  Chart Reviewed: Yes  Patient assessed for rehabilitation services?: Yes  Family / Caregiver Present: No  Patient Currently in Pain: Yes  Pain Assessment  Pain Assessment: 0-10  Pain Level: 8  Pain Type: Acute pain  Pain Location: Head (Headache)  Vital Signs  Temp: 97 °F (36.1 °C)  Temp Source: Temporal  Heart Rate Source: Telemetry  Resp: 18  BP Location: Right upper arm  Patient Position: Semi fowlers  Patient Currently in Pain: Yes  Oxygen Therapy  SpO2: 95 %  O2 Device: None (Room air)  O2 Flow Rate (L/min): 0 L/min  Social/Functional History  Social/Functional History  Lives With: Spouse, Daughter  Type of Home: House  Home Layout: One level  Home Access: Stairs to enter without rails  Entrance Stairs - Number of Steps: 2  Bathroom Shower/Tub: Walk-in shower  Bathroom Toilet: Standard  Bathroom Equipment: Shower chair  Home Equipment: Rolling walker, Cane  ADL Assistance: Independent  Homemaking Assistance: Independent  Ambulation Assistance: Independent  Transfer Assistance: Independent  Active : Yes  Mode of Transportation: Car  Type of occupation:  (works partially from home)  Leisure & Hobbies: crafting  Additional Comments: h.o. falls d/t balance issues from [address]. None in last year. Objective   Vision: Impaired  Vision Exceptions: Wears glasses at all times (reports double vision at times d/t MS)  Hearing: Within functional limits    Orientation  Overall Orientation Status: Within Normal Limits  Observation/Palpation  Posture: Good  Observation: pt lying in bed, agreeable to session. Balance  Sitting Balance: Independent  Standing Balance: Independent  Functional Mobility  Functional - Mobility Device: No device  Activity: To/from bathroom  Assist Level: Independent  Functional Mobility Comments: pt demo's good safety, good balance and endurance. Toilet Transfers  Equipment Used: Standard toilet  Toilet Transfer: Independent  ADL  Feeding: Independent  Grooming: Independent  UE Bathing: Independent  LE Bathing: Independent  UE Dressing: Independent  LE Dressing: Independent  Toileting: Independent  Additional Comments: pt with no reported or observed deficits with ADL completion; pt does report at times she has numbnes in R hand d/t MS but not consistent and pt remains Independent  Tone RUE  RUE Tone: Normotonic  Tone LUE  LUE Tone: Normotonic  Coordination  Movements Are Fluid And Coordinated: Yes     Bed mobility  Rolling to Right: Independent  Supine to Sit: Independent  Scooting: Independent  Transfers  Sit to stand: Independent  Stand to sit:  Independent     Cognition  Overall Cognitive Status: WNL  Perception  Overall Perceptual Status: WFL     Sensation  Overall Sensation Status: Impaired (R hand tingling intermittent (drops things at times), sometimes your feet.)        LUE AROM (degrees)  LUE AROM : WFL  RUE AROM (degrees)  RUE AROM : WFL  LUE Strength  Gross LUE Strength: WFL  LUE Strength Comment: 4+/5 B UEs  RUE Strength  Gross RUE Strength: WFL                   Plan   Plan  Times per week: 1 visit with savanna  Current Treatment Recommendations: Endurance Training, Safety Education & Training, Self-Care / ADL, Patient/Caregiver Education & Training AM-PAC Inpatient Daily Activity Raw Score: 24 (12/03/21 1302)  AM-PAC Inpatient ADL T-Scale Score : 57.54 (12/03/21 1302)  ADL Inpatient CMS 0-100% Score: 0 (12/03/21 1302)  ADL Inpatient CMS G-Code Modifier : CH (12/03/21 1302)    Goals  Short term goals  Time Frame for Short term goals: 1 visit with eval  Short term goal 1: pt will demo and verb good understanding of ed provided on pacing, EC/WS techs, general strengthening, and safety techs  Patient Goals   Patient goals : to go home       Therapy Time   Individual Concurrent Group Co-treatment   Time In 0849         Time Out 0917         Minutes 28          treatment min: Malinda 5379, OT

## 2021-12-04 LAB
ESTIMATED AVERAGE GLUCOSE: 103 MG/DL
HBA1C MFR BLD: 5.2 % (ref 4–6)

## 2021-12-04 NOTE — DISCHARGE INSTR - COC
Continuity of Care Form    Patient Name: Irma Valle   :  1975  MRN:  [id number]    Admit date:  2021  Discharge date:  ***    Code Status Order: Full Code   Advance Directives:      Admitting Physician:  Bre Perez DO  PCP: Lafayette Babinski, APRN - CNP    Discharging Nurse: [facility] Unit/Room#: -  Discharging Unit Phone Number: ***    Emergency Contact:   Extended Emergency Contact Information  Primary Emergency Contact: [address] of [address] [place] [address] Phone: [phone]  Relation: Other  Secondary Emergency Contact: Renuka Bolanos  Mobile Phone: [phone]  Relation: Child    Past Surgical History:  Past Surgical History:   Procedure Laterality Date    BACK SURGERY  2021    T10-11 posterior lateral fusion    CARPAL TUNNEL RELEASE Left 10-    CARPAL TUNNEL RELEASE Right 10-    CHOLECYSTECTOMY      HYSTERECTOMY      LAMINECTOMY N/A 2021    T10- T11  POSTERIOR LATERAL    FUSION  & INSTRUMENTATION    WITH NEUROMONITORING   EVOKES    NUVASIVE  2 C-ARM performed by Rancho Castorena MD at [address] [place] [address]       Immunization History:   Immunization History   Administered Date(s) Administered    COVID-19, Pfizer, PF, 30mcg/0.3mL 2021, 2021    Influenza Virus Vaccine 10/12/2016, 10/08/2018    Influenza, Quadv, IM, PF (6 mo and older Fluzone, Flulaval, Fluarix, and 3 yrs and older Afluria) 2017    Influenza, Quadv, Recombinant, IM PF (Flublok 18 yrs and older) 2020    Pneumococcal Conjugate 13-valent (Karen August) 2018    Pneumococcal Polysaccharide ([id number]) 10/08/2018, 10/26/2020       Active Problems:  Patient Active Problem List   Diagnosis Code    CTS (carpal tunnel syndrome) G56.00    Spinal instability of thoracic region M53.2X4    Migraine variant, intractable G43.819    Multiple sclerosis ([facility] G35    Hypothyroidism E03.9    Class 2 obesity due to (Video Swallowing Test): {Done Not Done YIDT:418496694}    Treatments at the Time of Hospital Discharge:   Respiratory Treatments: ***  Oxygen Therapy:  {Therapy; copd oxygen:09505}  Ventilator:    { CC Vent BBAW:442567037}    Rehab Therapies: {THERAPEUTIC INTERVENTION:0646087598}  Weight Bearing Status/Restrictions: { CC Weight Bearin}  Other Medical Equipment (for information only, NOT a DME order):  {EQUIPMENT:986055800}  Other Treatments: ***    Patient's personal belongings (please select all that are sent with patient):  {[facility] DME Belongings:337894774}    RN SIGNATURE:  {Esignature:473500834}    CASE MANAGEMENT/SOCIAL WORK SECTION    Inpatient Status Date: ***    Readmission Risk Assessment Score:  Readmission Risk              Risk of Unplanned Readmission:  0           Discharging to Facility/ Agency   Name:   Address:  Phone:  Fax:    Dialysis Facility (if applicable)   Name:  Address:  Dialysis Schedule:  Phone:  Fax:    / signature: {Esignature:932069876}    PHYSICIAN SECTION    Prognosis: {Prognosis:8383117189}    Condition at Discharge: [address] [place] [address] Patient Condition:409833219}    Rehab Potential (if transferring to Rehab): {Prognosis:4696441312}    Recommended Labs or Other Treatments After Discharge: ***    Physician Certification: I certify the above information and transfer of Rubin Marr  is necessary for the continuing treatment of the diagnosis listed and that she requires {Admit to Appropriate Level of Care:72847} for {GREATER/LESS:160824273} 30 days.      Update Admission H&P: {[facility]:735088803}    PHYSICIAN SIGNATURE:  {Esignature:947856914}

## 2021-12-04 NOTE — PROGRESS NOTES
Discharge Note:     All discharge instructions given, IV and Telemetry Box removed. Patient verbalizes understanding and denies any further questions regarding discharge at this time. Patient has all belongings, and was also given discharge packet with unit letter, discharge instructions/medication instructions. Patient denies any further issues at this time. Pt wheeled out to front discharge doors via wheelchair. Patient left premises without any issues in private vehicle.

## 2021-12-04 NOTE — DISCHARGE SUMMARY
[facility]  Office: [address], DO, Aristeo Zelayay, DO, Manny Base, DO, Sarkis Bravo Blood, DO, Estella Oakley MD, Aliza Dumont MD, Fabian Atwood MD, Monika Carl MD, Dar Naidu MD, Delbert Gomez MD, Caty Landaverde MD, Madalyn Wyman, DO, Ximena Hebert, DO, Jeannine Manzo MD,  Vito Starr, DO, Cecelia Hernandez MD, Ty Reinoso MD, Tami Hall MD, Tamiko Hale MD, Andrea Scott MD, Brown Flores MD, Gloria Limon MD, Analia Brar, [facility], [facility], [facility], Milton Mccormick, CNP, Dusty Sheffield, [facility], Linnette Treviño, Radha Agosto, CNP, Baron Lesch, CNP, Monico Bird, CNP, Jaquan Myers, CNP, Phuong Friedman PA-C, [place] Parks, [facility], Jordan Kaur, CNP, Chloe Veronica, CNP, Kwame Young, CNP, Brown Wilks, CNP, Narciso Camara, CNP, Gina Posadas, CNP, Aby Quezada, [facility]    Discharge Summary    Patient ID: Ramy Bonilla  :  1975   MRN: [id number]     ACCOUNT:  [de-identified]   Patient's PCP: QIAN George CNP  Admit Date: 2021   Discharge Date: 12/3/2021    Discharge Physician: Radha Molina DO     The patient was seen and examined on day of discharge and this discharge summary is in conjunction with any daily progress note from day of discharge.     Active Discharge Diagnoses:     Primary Problem  Migraine variant, intractable      Hospital Problems  Active Hospital Problems    Diagnosis Date Noted    Steroid-induced hyperglycemia [R73.9, T38.0X5A] 2021    Migraine variant, intractable [G43.819]     Multiple sclerosis (HCC) [G35]     Hypothyroidism [E03.9]     Class 2 obesity due to excess calories with body mass index (BMI) of 36.0 to 36.9 in adult [E66.09, Z68.36]     LAWRENCE (obstructive sleep apnea) [G47.33]          Hospital Course:     Brief History  As documented in the medical record:  \"Kelli J Lejeune is a 40 y.o. Non- / non  female who presents with Migraine (Sent by neurologist for MS flare up; migraine x1 week without relief)   and is admitted to the hospital for the management of Migraine variant, intractable.     The patient presents to the hospital with complaint of multiple sclerosis flare. She states that a few weeks ago she devloped laryngitis. She visited her PCP and was found to have a UTI and prescribed antibiotics. She states that about 1 week ago she developed a frontal migraine that she describes as constant, pressure like and severe in intensity. She does not have photo or phono sensitivity. She states this is typical for her migraines. She endorses feeling weak, fatigued and has a decreased appetite. She has nausea without vomiting. She states that she had double vision that was isolated to night time and that this has resolved. She visited her neurologist who suspected she was having a multiple sclerosis flare and was coordinating outpatient treatment with DHE and steroids. She states her symptoms were persistent and she contacted her neurologist who advised her to come to the emergency department. She denies shortness of breath, chest pain, difficulty speaking or unilateral weakness. No additional symptomology or modifying factors. She has past medical history that includes multiple sclerosis, migraines, hypothyroidism and LAWRENCE (does not use cpap). She had a back surgery at the end of August 2021 and endorses chronic back pain. She is fully vaccinated against COVID-19.      She follows outpatient with TIMO Agudelo CNP (DR Bhumi Marcelo) with neurology. \"     The intitial plan included:  1. \"Neurology consulted in ER. However the patient's neurologist does not have hospital privileges. Will consult [facility].    2. Patient given DHE, solumedrol, benadryl and magnesium sulfate in emergency department. 3. MRI brain. 4. Neuro checks q4h.   5. Hypothyroidism- check TSH, continue levothyroxine. 6. LAWRENCE- does not use cpap. Supplemental oxygen as needed. 7. Obesity- lifestyle modifications. 8. DVT prophylaxis. 9. Monitor vital signs. 10. Follow chemistries. 11. General diet. 12. Activity as tolerated. \"     The patient responded to therapy  Their status was stabilized   DC planning was initiated  The patient was instructed to follow up with their PCP, QIAN Caro CNP in one week      Discharge plan:     Pain management  Neurology evaluation and f/u Dr Misa Coon done  Solumedrol done  Reglan done  Glycemic contol - monitor and control blood sugars  Check A1c  Low Carb diet   DVT prophylaxis    No discharge procedures on file. Significant Diagnostic Studies:     Labs / Micro:  Labs:  Hematology:  Recent Labs     12/02/21  1730 12/03/21  0446   WBC 5.7 4.9   RBC 4.92 5.20*   HGB 15.2* 16.1*   HCT 45.2 48.3*   MCV 91.9 92.9   MCH 30.9 31.0   MCHC 33.6 33.3   RDW 13.2 12.9    281   MPV 9.5 9.5   INR  --  1.0     Chemistry:  Recent Labs     12/02/21  1730 12/03/21  0446    140   K 3.5* 4.1    109*   CO2 21 19*   GLUCOSE 93 208*   BUN 17 14   CREATININE 0.61 0.70   MG 2.2  --    ANIONGAP 11 12   LABGLOM >60 >60   GFRAA >60 >60   CALCIUM 8.9 9.0     Recent Labs     12/03/21  0446   TSH 0.60       Radiology:    No results found.       Consultations:    Consults:     Final Specialist Recommendations/Findings:   IP CONSULT TO INTERNAL MEDICINE  IP CONSULT TO NEUROLOGY  IP CONSULT TO NEUROLOGY        Discharged Condition:    Stable     Disposition: Home    Physician Follow Up:   QIAN Caro CNP  [facility]  [address] [place] [address]              Schedule an appointment as soon as possible for a visit in 1 week         Activity:  activity as tolerated    Diet:  Low carb    Discharge Medications:      Medication List CONTINUE taking these medications    Amivantamab-vmjw 350 MG/7ML Soln     amphetamine-dextroamphetamine 30 MG tablet  Commonly known as: ADDERALL     levothyroxine 25 MCG tablet  Commonly known as: SYNTHROID     rOPINIRole 2 MG tablet  Commonly known as: REQUIP     sennosides-docusate sodium 8.6-50 MG tablet  Commonly known as: SENOKOT-S  Take 1 tablet by mouth 2 times daily     tiZANidine 4 MG tablet  Commonly known as: ZANAFLEX  Take 1 tablet by mouth every 8 hours as needed (spasm)     topiramate 100 MG tablet  Commonly known as: TOPAMAX     venlafaxine 37.5 MG tablet  Commonly known as: EFFEXOR     vitamin B-12 100 MCG tablet  Commonly known as: CYANOCOBALAMIN            Time Spent on discharge is  17 mins in patient examination, evaluation, counseling, medication reconciliation, discharge plan and follow up. Electronically signed by   Pratima Goldman DO  12/4/2021  6:58 AM      Thank you Dr. Isaura Meza, APRN - CNP for the opportunity to be involved in this patient's care.

## 2021-12-21 ENCOUNTER — HOSPITAL ENCOUNTER (INPATIENT)
Age: 46
LOS: 2 days | Discharge: HOME | DRG: 060 | End: 2021-12-23
Attending: EMERGENCY MEDICINE | Admitting: INTERNAL MEDICINE
Payer: COMMERCIAL

## 2021-12-21 ENCOUNTER — APPOINTMENT (OUTPATIENT)
Dept: GENERAL RADIOLOGY | Age: 46
DRG: 060 | End: 2021-12-21
Payer: COMMERCIAL

## 2021-12-21 DIAGNOSIS — G43.819 MIGRAINE VARIANT, INTRACTABLE: ICD-10-CM

## 2021-12-21 DIAGNOSIS — G35 MULTIPLE SCLEROSIS EXACERBATION (HCC): Primary | ICD-10-CM

## 2021-12-21 DIAGNOSIS — G47.33 OSA (OBSTRUCTIVE SLEEP APNEA): ICD-10-CM

## 2021-12-21 PROBLEM — R51.9 HEADACHE: Status: ACTIVE | Noted: 2021-12-21

## 2021-12-21 LAB
ABSOLUTE EOS #: 0.25 K/UL (ref 0–0.44)
ABSOLUTE IMMATURE GRANULOCYTE: 0.05 K/UL (ref 0–0.3)
ABSOLUTE LYMPH #: 0.89 K/UL (ref 1.1–3.7)
ABSOLUTE MONO #: 0.55 K/UL (ref 0.1–1.2)
ALBUMIN SERPL-MCNC: 4.4 G/DL (ref 3.5–5.2)
ALBUMIN/GLOBULIN RATIO: ABNORMAL (ref 1–2.5)
ALP BLD-CCNC: 101 U/L (ref 35–104)
ALT SERPL-CCNC: 75 U/L (ref 5–33)
ANION GAP SERPL CALCULATED.3IONS-SCNC: 12 MMOL/L (ref 9–17)
AST SERPL-CCNC: 47 U/L
BASOPHILS # BLD: 0 % (ref 0–2)
BASOPHILS ABSOLUTE: <0.03 K/UL (ref 0–0.2)
BILIRUB SERPL-MCNC: 0.73 MG/DL (ref 0.3–1.2)
BUN BLDV-MCNC: 10 MG/DL (ref 6–20)
BUN/CREAT BLD: 18 (ref 9–20)
CALCIUM SERPL-MCNC: 9 MG/DL (ref 8.6–10.4)
CHLORIDE BLD-SCNC: 106 MMOL/L (ref 98–107)
CO2: 22 MMOL/L (ref 20–31)
CREAT SERPL-MCNC: 0.57 MG/DL (ref 0.5–0.9)
DIFFERENTIAL TYPE: ABNORMAL
EOSINOPHILS RELATIVE PERCENT: 4 % (ref 1–4)
GFR AFRICAN AMERICAN: >60 ML/MIN
GFR NON-AFRICAN AMERICAN: >60 ML/MIN
GFR SERPL CREATININE-BSD FRML MDRD: ABNORMAL ML/MIN/{1.73_M2}
GFR SERPL CREATININE-BSD FRML MDRD: ABNORMAL ML/MIN/{1.73_M2}
GLUCOSE BLD-MCNC: 204 MG/DL (ref 65–105)
GLUCOSE BLD-MCNC: 93 MG/DL (ref 70–99)
HCT VFR BLD CALC: 48.5 % (ref 36.3–47.1)
HEMOGLOBIN: 16.3 G/DL (ref 11.9–15.1)
IMMATURE GRANULOCYTES: 1 %
LIPASE: 25 U/L (ref 13–60)
LYMPHOCYTES # BLD: 15 % (ref 24–43)
MAGNESIUM: 2.1 MG/DL (ref 1.6–2.6)
MCH RBC QN AUTO: 30.5 PG (ref 25.2–33.5)
MCHC RBC AUTO-ENTMCNC: 33.6 G/DL (ref 28.4–34.8)
MCV RBC AUTO: 90.8 FL (ref 82.6–102.9)
MONOCYTES # BLD: 9 % (ref 3–12)
MYOGLOBIN: 30 NG/ML (ref 25–58)
MYOGLOBIN: 31 NG/ML (ref 25–58)
MYOGLOBIN: 37 NG/ML (ref 25–58)
NRBC AUTOMATED: 0 PER 100 WBC
PDW BLD-RTO: 12.9 % (ref 11.8–14.4)
PLATELET # BLD: 342 K/UL (ref 138–453)
PLATELET ESTIMATE: ABNORMAL
PMV BLD AUTO: 9.4 FL (ref 8.1–13.5)
POTASSIUM SERPL-SCNC: 3.7 MMOL/L (ref 3.7–5.3)
RBC # BLD: 5.34 M/UL (ref 3.95–5.11)
RBC # BLD: ABNORMAL 10*6/UL
SEG NEUTROPHILS: 71 % (ref 36–65)
SEGMENTED NEUTROPHILS ABSOLUTE COUNT: 4.36 K/UL (ref 1.5–8.1)
SODIUM BLD-SCNC: 140 MMOL/L (ref 135–144)
TOTAL PROTEIN: 7.6 G/DL (ref 6.4–8.3)
TROPONIN INTERP: NORMAL
TROPONIN T: NORMAL NG/ML
TROPONIN, HIGH SENSITIVITY: 6 NG/L (ref 0–14)
TROPONIN, HIGH SENSITIVITY: <6 NG/L (ref 0–14)
TROPONIN, HIGH SENSITIVITY: <6 NG/L (ref 0–14)
WBC # BLD: 6.1 K/UL (ref 3.5–11.3)
WBC # BLD: ABNORMAL 10*3/UL

## 2021-12-21 PROCEDURE — 2580000003 HC RX 258: Performed by: PHYSICIAN ASSISTANT

## 2021-12-21 PROCEDURE — 96361 HYDRATE IV INFUSION ADD-ON: CPT

## 2021-12-21 PROCEDURE — 99283 EMERGENCY DEPT VISIT LOW MDM: CPT

## 2021-12-21 PROCEDURE — 1200000000 HC SEMI PRIVATE

## 2021-12-21 PROCEDURE — 6370000000 HC RX 637 (ALT 250 FOR IP): Performed by: INTERNAL MEDICINE

## 2021-12-21 PROCEDURE — 99223 1ST HOSP IP/OBS HIGH 75: CPT | Performed by: INTERNAL MEDICINE

## 2021-12-21 PROCEDURE — 80053 COMPREHEN METABOLIC PANEL: CPT

## 2021-12-21 PROCEDURE — 6360000002 HC RX W HCPCS: Performed by: NURSE PRACTITIONER

## 2021-12-21 PROCEDURE — 93005 ELECTROCARDIOGRAM TRACING: CPT | Performed by: PHYSICIAN ASSISTANT

## 2021-12-21 PROCEDURE — 82947 ASSAY GLUCOSE BLOOD QUANT: CPT

## 2021-12-21 PROCEDURE — 96372 THER/PROPH/DIAG INJ SC/IM: CPT

## 2021-12-21 PROCEDURE — 96375 TX/PRO/DX INJ NEW DRUG ADDON: CPT

## 2021-12-21 PROCEDURE — G0378 HOSPITAL OBSERVATION PER HR: HCPCS

## 2021-12-21 PROCEDURE — 6360000002 HC RX W HCPCS: Performed by: PHYSICIAN ASSISTANT

## 2021-12-21 PROCEDURE — 83874 ASSAY OF MYOGLOBIN: CPT

## 2021-12-21 PROCEDURE — 6370000000 HC RX 637 (ALT 250 FOR IP): Performed by: NURSE PRACTITIONER

## 2021-12-21 PROCEDURE — 6360000002 HC RX W HCPCS: Performed by: INTERNAL MEDICINE

## 2021-12-21 PROCEDURE — 84484 ASSAY OF TROPONIN QUANT: CPT

## 2021-12-21 PROCEDURE — 83735 ASSAY OF MAGNESIUM: CPT

## 2021-12-21 PROCEDURE — 85025 COMPLETE CBC W/AUTO DIFF WBC: CPT

## 2021-12-21 PROCEDURE — 83690 ASSAY OF LIPASE: CPT

## 2021-12-21 PROCEDURE — 96374 THER/PROPH/DIAG INJ IV PUSH: CPT

## 2021-12-21 PROCEDURE — 96376 TX/PRO/DX INJ SAME DRUG ADON: CPT

## 2021-12-21 PROCEDURE — 71045 X-RAY EXAM CHEST 1 VIEW: CPT

## 2021-12-21 PROCEDURE — 36415 COLL VENOUS BLD VENIPUNCTURE: CPT

## 2021-12-21 RX ORDER — TOPIRAMATE 25 MG/1
25 TABLET ORAL 2 TIMES DAILY
Status: DISCONTINUED | OUTPATIENT
Start: 2021-12-21 | End: 2021-12-23 | Stop reason: HOSPADM

## 2021-12-21 RX ORDER — POTASSIUM CHLORIDE 20 MEQ/1
40 TABLET, EXTENDED RELEASE ORAL PRN
Status: DISCONTINUED | OUTPATIENT
Start: 2021-12-21 | End: 2021-12-23 | Stop reason: HOSPADM

## 2021-12-21 RX ORDER — DEXTROSE MONOHYDRATE 50 MG/ML
100 INJECTION, SOLUTION INTRAVENOUS PRN
Status: DISCONTINUED | OUTPATIENT
Start: 2021-12-21 | End: 2021-12-23 | Stop reason: HOSPADM

## 2021-12-21 RX ORDER — DEXTROSE MONOHYDRATE 25 G/50ML
12.5 INJECTION, SOLUTION INTRAVENOUS PRN
Status: DISCONTINUED | OUTPATIENT
Start: 2021-12-21 | End: 2021-12-23 | Stop reason: HOSPADM

## 2021-12-21 RX ORDER — ACETAMINOPHEN 325 MG/1
650 TABLET ORAL EVERY 6 HOURS PRN
Status: DISCONTINUED | OUTPATIENT
Start: 2021-12-21 | End: 2021-12-23 | Stop reason: HOSPADM

## 2021-12-21 RX ORDER — ONDANSETRON 2 MG/ML
4 INJECTION INTRAMUSCULAR; INTRAVENOUS ONCE
Status: COMPLETED | OUTPATIENT
Start: 2021-12-21 | End: 2021-12-21

## 2021-12-21 RX ORDER — 0.9 % SODIUM CHLORIDE 0.9 %
1000 INTRAVENOUS SOLUTION INTRAVENOUS ONCE
Status: COMPLETED | OUTPATIENT
Start: 2021-12-21 | End: 2021-12-21

## 2021-12-21 RX ORDER — SODIUM CHLORIDE 9 MG/ML
25 INJECTION, SOLUTION INTRAVENOUS PRN
Status: DISCONTINUED | OUTPATIENT
Start: 2021-12-21 | End: 2021-12-23 | Stop reason: HOSPADM

## 2021-12-21 RX ORDER — ONDANSETRON 2 MG/ML
4 INJECTION INTRAMUSCULAR; INTRAVENOUS EVERY 6 HOURS PRN
Status: DISCONTINUED | OUTPATIENT
Start: 2021-12-21 | End: 2021-12-23 | Stop reason: HOSPADM

## 2021-12-21 RX ORDER — POTASSIUM CHLORIDE 7.45 MG/ML
10 INJECTION INTRAVENOUS PRN
Status: DISCONTINUED | OUTPATIENT
Start: 2021-12-21 | End: 2021-12-23 | Stop reason: HOSPADM

## 2021-12-21 RX ORDER — TIZANIDINE 4 MG/1
4 TABLET ORAL EVERY 8 HOURS PRN
Status: DISCONTINUED | OUTPATIENT
Start: 2021-12-21 | End: 2021-12-23 | Stop reason: HOSPADM

## 2021-12-21 RX ORDER — ONDANSETRON 4 MG/1
4 TABLET, ORALLY DISINTEGRATING ORAL EVERY 8 HOURS PRN
Status: DISCONTINUED | OUTPATIENT
Start: 2021-12-21 | End: 2021-12-23 | Stop reason: HOSPADM

## 2021-12-21 RX ORDER — SENNA AND DOCUSATE SODIUM 50; 8.6 MG/1; MG/1
1 TABLET, FILM COATED ORAL 2 TIMES DAILY
Status: DISCONTINUED | OUTPATIENT
Start: 2021-12-21 | End: 2021-12-23 | Stop reason: HOSPADM

## 2021-12-21 RX ORDER — HYDROMORPHONE HYDROCHLORIDE 1 MG/ML
1 INJECTION, SOLUTION INTRAMUSCULAR; INTRAVENOUS; SUBCUTANEOUS EVERY 4 HOURS PRN
Status: DISCONTINUED | OUTPATIENT
Start: 2021-12-21 | End: 2021-12-23 | Stop reason: HOSPADM

## 2021-12-21 RX ORDER — METHYLPREDNISOLONE SODIUM SUCCINATE 125 MG/2ML
125 INJECTION, POWDER, LYOPHILIZED, FOR SOLUTION INTRAMUSCULAR; INTRAVENOUS EVERY 6 HOURS
Status: DISCONTINUED | OUTPATIENT
Start: 2021-12-21 | End: 2021-12-22

## 2021-12-21 RX ORDER — VENLAFAXINE 37.5 MG/1
37.5 TABLET ORAL 2 TIMES DAILY
Status: DISCONTINUED | OUTPATIENT
Start: 2021-12-21 | End: 2021-12-23 | Stop reason: HOSPADM

## 2021-12-21 RX ORDER — MAGNESIUM SULFATE 1 G/100ML
1000 INJECTION INTRAVENOUS PRN
Status: DISCONTINUED | OUTPATIENT
Start: 2021-12-21 | End: 2021-12-23 | Stop reason: HOSPADM

## 2021-12-21 RX ORDER — SODIUM CHLORIDE 0.9 % (FLUSH) 0.9 %
10 SYRINGE (ML) INJECTION PRN
Status: DISCONTINUED | OUTPATIENT
Start: 2021-12-21 | End: 2021-12-23 | Stop reason: HOSPADM

## 2021-12-21 RX ORDER — POLYETHYLENE GLYCOL 3350 17 G/17G
17 POWDER, FOR SOLUTION ORAL DAILY PRN
Status: DISCONTINUED | OUTPATIENT
Start: 2021-12-21 | End: 2021-12-23 | Stop reason: HOSPADM

## 2021-12-21 RX ORDER — NICOTINE POLACRILEX 4 MG
15 LOZENGE BUCCAL PRN
Status: DISCONTINUED | OUTPATIENT
Start: 2021-12-21 | End: 2021-12-23 | Stop reason: HOSPADM

## 2021-12-21 RX ORDER — LEVOTHYROXINE SODIUM 0.03 MG/1
25 TABLET ORAL DAILY
Status: DISCONTINUED | OUTPATIENT
Start: 2021-12-21 | End: 2021-12-23 | Stop reason: HOSPADM

## 2021-12-21 RX ORDER — SODIUM CHLORIDE 0.9 % (FLUSH) 0.9 %
5-40 SYRINGE (ML) INJECTION EVERY 12 HOURS SCHEDULED
Status: DISCONTINUED | OUTPATIENT
Start: 2021-12-21 | End: 2021-12-23 | Stop reason: HOSPADM

## 2021-12-21 RX ORDER — ROPINIROLE 1 MG/1
2 TABLET, FILM COATED ORAL 2 TIMES DAILY
Status: DISCONTINUED | OUTPATIENT
Start: 2021-12-21 | End: 2021-12-23 | Stop reason: HOSPADM

## 2021-12-21 RX ORDER — HYDROMORPHONE HYDROCHLORIDE 1 MG/ML
1 INJECTION, SOLUTION INTRAMUSCULAR; INTRAVENOUS; SUBCUTANEOUS ONCE
Status: COMPLETED | OUTPATIENT
Start: 2021-12-21 | End: 2021-12-21

## 2021-12-21 RX ORDER — ACETAMINOPHEN 650 MG/1
650 SUPPOSITORY RECTAL EVERY 6 HOURS PRN
Status: DISCONTINUED | OUTPATIENT
Start: 2021-12-21 | End: 2021-12-23 | Stop reason: HOSPADM

## 2021-12-21 RX ADMIN — METHYLPREDNISOLONE SODIUM SUCCINATE 125 MG: 125 INJECTION, POWDER, FOR SOLUTION INTRAMUSCULAR; INTRAVENOUS at 22:00

## 2021-12-21 RX ADMIN — HYDROMORPHONE HYDROCHLORIDE 0.5 MG: 1 INJECTION, SOLUTION INTRAMUSCULAR; INTRAVENOUS; SUBCUTANEOUS at 22:00

## 2021-12-21 RX ADMIN — SODIUM CHLORIDE 1000 ML: 9 INJECTION, SOLUTION INTRAVENOUS at 14:40

## 2021-12-21 RX ADMIN — ONDANSETRON 4 MG: 2 INJECTION INTRAMUSCULAR; INTRAVENOUS at 21:59

## 2021-12-21 RX ADMIN — ROPINIROLE HYDROCHLORIDE 2 MG: 1 TABLET, FILM COATED ORAL at 22:00

## 2021-12-21 RX ADMIN — SODIUM CHLORIDE 500 MG: 9 INJECTION, SOLUTION INTRAVENOUS at 16:26

## 2021-12-21 RX ADMIN — ENOXAPARIN SODIUM 30 MG: 100 INJECTION SUBCUTANEOUS at 21:59

## 2021-12-21 RX ADMIN — HYDROMORPHONE HYDROCHLORIDE 1 MG: 1 INJECTION, SOLUTION INTRAMUSCULAR; INTRAVENOUS; SUBCUTANEOUS at 17:13

## 2021-12-21 RX ADMIN — INSULIN LISPRO 1 UNITS: 100 INJECTION, SOLUTION INTRAVENOUS; SUBCUTANEOUS at 22:00

## 2021-12-21 RX ADMIN — ONDANSETRON 4 MG: 2 INJECTION INTRAMUSCULAR; INTRAVENOUS at 17:12

## 2021-12-21 ASSESSMENT — PAIN DESCRIPTION - LOCATION
LOCATION: GENERALIZED
LOCATION: GENERALIZED
LOCATION: GENERALIZED;HEAD

## 2021-12-21 ASSESSMENT — PAIN DESCRIPTION - PROGRESSION: CLINICAL_PROGRESSION: NOT CHANGED

## 2021-12-21 ASSESSMENT — PAIN SCALES - GENERAL
PAINLEVEL_OUTOF10: 5
PAINLEVEL_OUTOF10: 6
PAINLEVEL_OUTOF10: 8
PAINLEVEL_OUTOF10: 4

## 2021-12-21 ASSESSMENT — PAIN DESCRIPTION - FREQUENCY
FREQUENCY: CONTINUOUS

## 2021-12-21 ASSESSMENT — PAIN DESCRIPTION - DESCRIPTORS
DESCRIPTORS: ACHING
DESCRIPTORS: ACHING
DESCRIPTORS: TIRING

## 2021-12-21 ASSESSMENT — PAIN DESCRIPTION - PAIN TYPE
TYPE: ACUTE PAIN
TYPE: ACUTE PAIN

## 2021-12-21 ASSESSMENT — PAIN DESCRIPTION - ONSET
ONSET: ON-GOING
ONSET: ON-GOING

## 2021-12-21 ASSESSMENT — PAIN - FUNCTIONAL ASSESSMENT: PAIN_FUNCTIONAL_ASSESSMENT: ACTIVITIES ARE NOT PREVENTED

## 2021-12-21 NOTE — H&P
[facility]  Office: [address], DO, Katrina Sarah, DO, Goodyear Silvestre, DO, Harlan Carlsonl Blood, DO, Cherie Jackson MD, Lenora Torres MD, Claudio Delgado MD, Deb Mcwilliams MD, Osmani Allen MD, Anne Marie Blunt MD, Kayla Lopez MD, Graeme Oneil, DO, James Taveras, DO, Rose Red MD,  Lyndsey Griffin DO, Shrerell Griffith MD, Toni Amin MD, Mitch Monteiro MD, Kendal Monzon MD, Yordy Elizabeth MD, Nereyda Ceja MD, Tori Laguna MD, Shyam Oliva CNP, [facility], CNP, Renetta Geiger, CNP, Cande العلي, CNS, Dmitriy Dean, CNP, Maria Luisa Rojas, CNP, Mary Ellen Jewell, CNP, Nata Carrion, CNP, Rudolph Santoro, CNP, Yanick Cummings PA-C, Tera Peña DNP, Mickie Garrido DNP, Oscar Bond, CNP, Hari Kowalski, CNP, Robel Gomez, CNP, Gardenia King, CNP, Manuel Salazar CNP, Dany Walker, CNP         [address]    HISTORY AND PHYSICAL EXAMINATION            Date:   12/21/2021  Patient name:  Yazmin Mendes  Date of admission:  12/21/2021  2:03 PM  MRN:   [id number]  Account:  [de-identified]  YOB: 1975  PCP:    QIAN Castillo CNP  Room:   2025/2025-01  Code Status:    Full Code    Chief Complaint:     Chief Complaint   Patient presents with    Fatigue     hx of MS    Dizziness    Headache       History Obtained From:     patient, electronic medical record    History of Present Illness:     Yazmin Mendes is a 40 y.o.  Non- / non  female who presents with Fatigue (hx of MS), Dizziness, and Headache  Patient states she had the symptoms for few weeks now but in the past week it got worse, she was supposed to have IVIG infusion as recommended by her neurologist Dr. Keila Allen, however it has not been approved by her insurance yet, in the meantime she is feeling much worse because of which Dr. Keila Allen recommended her to come to hospital and get admitted for starting high-dose of steroids and IVIG if needed  Patient denies any nausea or vomiting  She does complain of generalized fatigue and bifrontal headache  During previous admissions with high-dose of steroids her sugars had gone up her current blood glucose is 93  Past Medical History:     Past Medical History:   Diagnosis Date    Balance problem     COVID-19 virus infection 02/2021    Dizziness     History of depression     Hypothyroidism     Migraines     Multiple sclerosis (HCC)     Restless leg syndrome     Sleep apnea     patient does not wear CPAP        Past Surgical History:     Past Surgical History:   Procedure Laterality Date    BACK SURGERY  08/23/2021    T10-11 posterior lateral fusion    CARPAL TUNNEL RELEASE Left 10-    CARPAL TUNNEL RELEASE Right 10-    CHOLECYSTECTOMY      HYSTERECTOMY      LAMINECTOMY N/A 8/23/2021    T10- T11  POSTERIOR LATERAL    FUSION  & INSTRUMENTATION    WITH NEUROMONITORING   EVOKES    NUVASIVE  2 C-ARM performed by Rolo Bullock MD at [address] [place] [address]  2001        Medications Prior to Admission:     Prior to Admission medications    Medication Sig Start Date End Date Taking? Authorizing Provider   amphetamine-dextroamphetamine (ADDERALL) 30 MG tablet Take 30 mg by mouth daily.  XR 30mg   Yes Historical Provider, MD   venlafaxine (EFFEXOR) 37.5 MG tablet Take 37.5 mg by mouth 2 times daily   Yes Historical Provider, MD   topiramate (TOPAMAX) 100 MG tablet Take 100 mg by mouth 2 times daily   Yes Historical Provider, MD   rOPINIRole (REQUIP) 2 MG tablet Take 2 mg by mouth 2 times daily   Yes Historical Provider, MD   Amivantamab-vmjw 350 MG/7ML SOLN Infuse 7 mLs intravenously every 30 days   Yes Historical Provider, MD   levothyroxine (SYNTHROID) 25 MCG tablet Take 25 mcg by mouth Daily  8/26/14  Yes Historical Provider, MD   vitamin B-12 (CYANOCOBALAMIN) 100 MCG tablet Take 100 mcg by mouth daily    Yes Historical Provider, MD sennosides-docusate sodium (SENOKOT-S) 8.6-50 MG tablet Take 1 tablet by mouth 2 times daily 21   Inez Healy MD   tiZANidine (ZANAFLEX) 4 MG tablet Take 1 tablet by mouth every 8 hours as needed (spasm) 21   Inez Healy MD        Allergies:     Pcn [penicillins]    Social History:     Tobacco:    reports that she has never smoked. She has never used smokeless tobacco.  Alcohol:      reports no history of alcohol use. Drug Use:  reports no history of drug use. Family History:     Family History   Problem Relation Age of Onset    Heart Attack Father        Review of Systems:     Positive and Negative as described in HPI.     CONSTITUTIONAL:  negative for fevers, chills, sweats, fatigue, weight loss  HEENT:  negative for vision, hearing changes, runny nose, throat pain  RESPIRATORY:  negative for shortness of breath, cough, congestion, wheezing  CARDIOVASCULAR:  negative for chest pain, palpitations  GASTROINTESTINAL:  negative for nausea, vomiting, diarrhea, constipation, change in bowel habits, abdominal pain   GENITOURINARY:  negative for difficulty of urination, burning with urination, frequency   INTEGUMENT:  negative for rash, skin lesions, easy bruising   HEMATOLOGIC/LYMPHATIC:  negative for swelling/edema   ALLERGIC/IMMUNOLOGIC:  negative for urticaria , itching  ENDOCRINE:  negative increase in drinking, increase in urination, hot or cold intolerance  MUSCULOSKELETAL:  negative joint pains, muscle aches, swelling of joints  NEUROLOGICAL:  + for bifrontal headaches, dizziness, denies any visual disturbance, numbness, pain, tingling extremities  BEHAVIOR/PSYCH:  negative for depression, anxiety    Physical Exam:   /79   Pulse 83   Temp 97.9 °F (36.6 °C) (Oral)   Resp 18   Ht 5' 8\" (1.727 m)   Wt 240 lb (108.9 kg)   LMP 10/16/2005   SpO2 95%   BMI 36.49 kg/m²   Temp (24hrs), Av °F (36.7 °C), Min:97.9 °F (36.6 °C), Max:98.1 °F (36.7 °C)    No results for input(s): POCGLU in the last 72 hours. Intake/Output Summary (Last 24 hours) at 12/21/2021 1856  Last data filed at 12/21/2021 1656  Gross per 24 hour   Intake 1199.71 ml   Output    Net 1199.71 ml       General Appearance:  alert, well appearing, and in no mild distress, obese  Mental status: oriented to person, place, and time, anxious  Head:  normocephalic, atraumatic  Eye: no icterus, redness, pupils equal and reactive, extraocular eye movements intact, conjunctiva clear  Ear: normal external ear, no discharge, hearing intact  Nose:  no drainage noted  Mouth: mucous membranes moist  Neck: supple, no carotid bruits, thyroid not palpable  Lungs: Bilateral equal air entry, clear to auscultation, no wheezing, rales or rhonchi, normal effort  Cardiovascular: normal rate, regular rhythm, no murmur, gallop, rub.   Abdomen: Soft, nontender, nondistended, normal bowel sounds, no hepatomegaly or splenomegaly  Neurologic: There are no new focal motor or sensory deficits, normal muscle tone and bulk, no abnormal sensation, normal speech, cranial nerves II through XII grossly intact  Skin: No gross lesions, rashes, bruising or bleeding on exposed skin area  Extremities:  peripheral pulses palpable, no pedal edema or calf pain with palpation  Psych: Anxious affect     Investigations:      Laboratory Testing:  Recent Results (from the past 24 hour(s))   EKG 12 Lead    Collection Time: 12/21/21  2:37 PM   Result Value Ref Range    Ventricular Rate 99 BPM    Atrial Rate 99 BPM    P-R Interval 138 ms    QRS Duration 86 ms    Q-T Interval 346 ms    QTc Calculation (Bazett) 444 ms    P Axis 49 degrees    R Axis 3 degrees    T Axis 24 degrees   CBC Auto Differential    Collection Time: 12/21/21  2:40 PM   Result Value Ref Range    WBC 6.1 3.5 - 11.3 k/uL    RBC 5.34 (H) 3.95 - 5.11 m/uL    Hemoglobin 16.3 (H) 11.9 - 15.1 g/dL    Hematocrit 48.5 (H) 36.3 - 47.1 %    MCV 90.8 82.6 - 102.9 fL    MCH 30.5 25.2 - 33.5 pg    MCHC 33.6 28.4 - 34.8 g/dL Result Value Ref Range    Troponin, High Sensitivity <6 0 - 14 ng/L    Troponin T NOT REPORTED <0.03 ng/mL    Troponin Interp NOT REPORTED     Myoglobin 30 25 - 58 ng/mL       Imaging/Diagnostics:  XR CHEST PORTABLE    Result Date: 12/21/2021  No acute cardiopulmonary disease suspected. Assessment :      Hospital Problems           Last Modified POA    * (Principal) Multiple sclerosis exacerbation ([address] 12/21/2021 Yes    Intractable migraine without aura and without status migrainosus 12/21/2021 Yes    Headachebifrontal 12/21/2021 Yes    Hypothyroidism 12/21/2021 Yes    Class 2 obesity due to excess calories with body mass index (BMI) of 36.0 to 36.9 in adult 12/21/2021 Yes    LAWRENCE (obstructive sleep apnea) 12/21/2021 Yes    Steroid-induced hyperglycemia 12/21/2021 Yes          Plan:     Patient status inpatient in the Med/Surge    1. Start high dose of IV steroids  2. Morphine as needed for pain and headaches  3. Insulin sliding scale due to history of hyperglycemia with steroids  4. DVT prophylaxis  5. Consult neurology    Consultations:   IP CONSULT TO HOSPITALIST  IP CONSULT TO NEUROLOGY     Patient is admitted as inpatient status because of co-morbidities listed above, severity of signs and symptoms as outlined, requirement for current medical therapies and most importantly because of direct risk to patient if care not provided in a hospital setting. Expected length of stay > 48 hours.     Matt Flor MD  12/21/2021  6:56 PM    Copy sent to Dr. Renuka Higuera, APRN - CNP

## 2021-12-21 NOTE — ED PROVIDER NOTES
[address] [place] [address] ED  EMERGENCY DEPARTMENT ENCOUNTER   ATTENDING ATTESTATION     Pt Name: Yazmin Mendes  MRN: [id number]  Ryleegfsivan 1975  Date of evaluation: 12/21/21       Yazmin Mendes is a 40 y.o. female who presents with Fatigue (hx of MS), Dizziness, and Headache      MDM:     52-year-old female with a history of MS presents with complaints of severe generalized fatigue, plan is admission to the medicine service. Neurology consultation. Vitals:   Vitals:    12/21/21 1359   BP: (!) 139/95   Pulse: 116   Resp: 16   Temp: 98.1 °F (36.7 °C)   TempSrc: Oral   SpO2: 97%   Weight: 240 lb (108.9 kg)   Height: 5' 8\" (1.727 m)         I personally evaluated and examined the patient in conjunction with the Midlevel provider and agree with the assessment, treatment plan, and disposition of the patient as recorded by the midlevel. I performed a history and physical examination of the patient and discussed management with the midlevel. I reviewed the midlevels note and agree with the documented findings and plan of care. Any areas of disagreement are noted on the chart. I was personally present for the key portions of any procedures. I have documented in the chart those procedures where I was not present during the key portions. I have personally reviewed all images and agree with the midlevel's interpretation. I have reviewed the emergency nurses triage note. I agree with the chief complaint, past medical history, past surgical history, allergies, medications, social and family history as documented unless otherwise noted.     Polo Machado MD  Attending Emergency  Physician                  Calhoun Goodpasture, MD  12/21/21 7726

## 2021-12-21 NOTE — ED PROVIDER NOTES
[facility] MED SURG  eMERGENCY dEPARTMENTeNCOUnter      Pt Name: Ivonne Claude  MRN: [id number]  [place] 1975  Date ofevaluation: 12/21/2021  Provider: Emanuel Amin [address] [place] [address]       Chief Complaint   Patient presents with    Fatigue     hx of MS    Dizziness    Headache         HISTORY OF PRESENT ILLNESS  (Location/Symptom, Timing/Onset, Context/Setting, Quality, Duration, Modifying Factors, Severity.)   Ivonne Claude is a 40 y.o. female who presents to the emergency department with weakness fatigue. Reports being admitted earlier part of this month for emesis laceration headache. Spoke with her neurologist and return to the ER for Solu-Medrol due to the IVIG G not being able to be authorized by her insurance yet. No chest pain or shortness of breath. No fever chills. No nausea. No cough. Nursing Notes were reviewed. ALLERGIES     Pcn [penicillins]    CURRENT MEDICATIONS       Current Discharge Medication List      CONTINUE these medications which have NOT CHANGED    Details   amphetamine-dextroamphetamine (ADDERALL) 30 MG tablet Take 30 mg by mouth daily.  XR 30mg      venlafaxine (EFFEXOR) 37.5 MG tablet Take 37.5 mg by mouth 2 times daily      topiramate (TOPAMAX) 100 MG tablet Take 100 mg by mouth 2 times daily      rOPINIRole (REQUIP) 2 MG tablet Take 2 mg by mouth 2 times daily      Amivantamab-vmjw 350 MG/7ML SOLN Infuse 7 mLs intravenously every 30 days      levothyroxine (SYNTHROID) 25 MCG tablet Take 25 mcg by mouth Daily       vitamin B-12 (CYANOCOBALAMIN) 100 MCG tablet Take 100 mcg by mouth daily       sennosides-docusate sodium (SENOKOT-S) 8.6-50 MG tablet Take 1 tablet by mouth 2 times daily  Qty: 60 tablet, Refills: 0      tiZANidine (ZANAFLEX) 4 MG tablet Take 1 tablet by mouth every 8 hours as needed (spasm)  Qty: 50 tablet, Refills: 0             PAST MEDICAL HISTORY         Diagnosis Date    Balance problem     COVID-19 virus infection 02/2021    Dizziness     History of depression     Hypothyroidism     Migraines     Multiple sclerosis (HCC)     Restless leg syndrome     Sleep apnea     patient does not wear CPAP       SURGICAL HISTORY           Procedure Laterality Date    BACK SURGERY  2021    T10-11 posterior lateral fusion    CARPAL TUNNEL RELEASE Left 10-    CARPAL TUNNEL RELEASE Right 10-    CHOLECYSTECTOMY      HYSTERECTOMY      LAMINECTOMY N/A 2021    T10- T11  POSTERIOR LATERAL    FUSION  & INSTRUMENTATION    WITH NEUROMONITORING   EVOKES    NUVASIVE  2 C-ARM performed by Alice Pickard MD at [address]         FAMILY HISTORY           Problem Relation Age of Onset    Heart Attack Father      Family Status   Relation Name Status    Father          SOCIAL HISTORY      reports that she has never smoked. She has never used smokeless tobacco. She reports that she does not drink alcohol and does not use drugs. REVIEW OFSYSTEMS    (2-9 systems for level 4, 10 or more for level 5)   Review of Systems    Except as noted above the remainder of the review of systems was reviewed and negative. PHYSICAL EXAM    (up to 7 for level 4, 8 or more for level 5)     ED Triage Vitals [21 1359]   BP Temp Temp Source Pulse Resp SpO2 Height Weight   (!) 139/95 98.1 °F (36.7 °C) Oral 116 16 97 % 5' 8\" (1.727 m) 240 lb (108.9 kg)      Physical Exam  Constitutional:       Appearance: She is well-developed. HENT:      Head: Normocephalic and atraumatic. Cardiovascular:      Rate and Rhythm: Normal rate and regular rhythm. Pulmonary:      Effort: Pulmonary effort is normal.      Breath sounds: Normal breath sounds. Abdominal:      General: There is no distension. Palpations: Abdomen is soft. Tenderness: There is no abdominal tenderness. Musculoskeletal:         General: Normal range of motion.       Cervical back: Normal range of motion and neck supple. Skin:     General: Skin is warm. Findings: No rash. Neurological:      Mental Status: She is alert and oriented to person, place, and time. Cranial Nerves: Cranial nerve deficit: generealized. Motor: Weakness present. Psychiatric:         Behavior: Behavior normal.                 DIAGNOSTIC RESULTS     EKG: All EKG's are interpreted by the Emergency Department Physician who either signs or Co-signs this chart in the absence of a cardiologist.        RADIOLOGY:   Non-plain film images such as CT, Ultrasound and MRI are read by the radiologist. Plain radiographic images arevisualized and preliminarily interpreted by the emergency physician with the below findings:        Interpretation per the Radiologist below, if available at thetime of this note:          ED BEDSIDE ULTRASOUND:   Performed by ED Physician - none    LABS:  Labs Reviewed   CBC WITH AUTO DIFFERENTIAL - Abnormal; Notable for the following components:       Result Value    RBC 5.34 (*)     Hemoglobin 16.3 (*)     Hematocrit 48.5 (*)     Seg Neutrophils 71 (*)     Lymphocytes 15 (*)     Immature Granulocytes 1 (*)     Absolute Lymph # 0.89 (*)     All other components within normal limits   COMPREHENSIVE METABOLIC PANEL - Abnormal; Notable for the following components:    ALT 75 (*)     AST 47 (*)     All other components within normal limits   POC GLUCOSE FINGERSTICK - Abnormal; Notable for the following components:    POC Glucose 204 (*)     All other components within normal limits   LIPASE   TROP/MYOGLOBIN   TROP/MYOGLOBIN   TROP/MYOGLOBIN   MAGNESIUM   BASIC METABOLIC PANEL W/ REFLEX TO MG FOR LOW K   CBC   POCT GLUCOSE   POCT GLUCOSE   POCT GLUCOSE       All other labs were within normal range or not returned as of this dictation.     EMERGENCY DEPARTMENT COURSE and DIFFERENTIAL DIAGNOSIS/MDM:   Vitals:    Vitals:    12/21/21 1700 12/21/21 1755 12/21/21 1943 12/21/21 1945   BP: 124/86 124/79 (!) 143/94 (!) 136/90 Pulse: 89 83 103 102   Resp:  18 16 16   Temp:  97.9 °F (36.6 °C) 98.1 °F (36.7 °C)    TempSrc:  Oral Oral    SpO2: 97% 95% 96% 96%   Weight:       Height:         3:01 PM EST  Case discussed with dr Quincy sanchez, who is patient's neurologist.  Will give 500 mg ivpb of solumedrol per our discussion. Will admit for ms exacerbation. CONSULTS:  IP CONSULT TO HOSPITALIST  IP CONSULT TO NEUROLOGY    PROCEDURES:  Procedures        FINAL IMPRESSION      1. Multiple sclerosis exacerbation ([facility] [address]          DISPOSITION/PLAN   DISPOSITION Admitted 12/21/2021 04:33:13 PM      PATIENTREFERRED TO:   No follow-up provider specified.     DISCHARGE MEDICATIONS:     Current Discharge Medication List              (Please note that portions of this note were completed with a voice recognition program.  Efforts were made to edit thedictations but occasionally words are mis-transcribed.)    JAYNE Sampson PA-C  12/21/21 5540

## 2021-12-21 NOTE — PROGRESS NOTES
Patient weight is between 101-149kg. For prophylaxis with Enoxaparin, Pharmacy adjusted the dose to account for the patient's increased body weight in accordance with hospital approved protocol. The dose has been changed to 30mg BID. Please contact pharmacy with any concerns @ [phone]. Thank you. Lucius Jacobs [facility]  12/21/2021 6:09 PM  Weight = 108.9 kg.

## 2021-12-21 NOTE — ED NOTES
Pt came in with c/o headache, fatigue, generalized weakness and pain, and dizziness. Pt states she has had the symptoms for a few weeks now, this past week the symptoms have gotten worse. Pt reports she was supposed to have an infusion, uncertain of type, but insurance has not agreed to cover it.       Naif Littlejohn RN  12/21/21 7818

## 2021-12-22 ENCOUNTER — APPOINTMENT (OUTPATIENT)
Dept: GENERAL RADIOLOGY | Age: 46
DRG: 060 | End: 2021-12-22
Payer: COMMERCIAL

## 2021-12-22 LAB
ANION GAP SERPL CALCULATED.3IONS-SCNC: 17 MMOL/L (ref 9–17)
BUN BLDV-MCNC: 9 MG/DL (ref 6–20)
BUN/CREAT BLD: 13 (ref 9–20)
CALCIUM SERPL-MCNC: 8.7 MG/DL (ref 8.6–10.4)
CHLORIDE BLD-SCNC: 104 MMOL/L (ref 98–107)
CO2: 17 MMOL/L (ref 20–31)
CREAT SERPL-MCNC: 0.68 MG/DL (ref 0.5–0.9)
EKG ATRIAL RATE: 99 BPM
EKG P AXIS: 49 DEGREES
EKG P-R INTERVAL: 138 MS
EKG Q-T INTERVAL: 346 MS
EKG QRS DURATION: 86 MS
EKG QTC CALCULATION (BAZETT): 444 MS
EKG R AXIS: 3 DEGREES
EKG T AXIS: 24 DEGREES
EKG VENTRICULAR RATE: 99 BPM
FOLATE: 2.3 NG/ML
GFR AFRICAN AMERICAN: >60 ML/MIN
GFR NON-AFRICAN AMERICAN: >60 ML/MIN
GFR SERPL CREATININE-BSD FRML MDRD: ABNORMAL ML/MIN/{1.73_M2}
GFR SERPL CREATININE-BSD FRML MDRD: ABNORMAL ML/MIN/{1.73_M2}
GLUCOSE BLD-MCNC: 155 MG/DL (ref 65–105)
GLUCOSE BLD-MCNC: 156 MG/DL (ref 65–105)
GLUCOSE BLD-MCNC: 220 MG/DL (ref 65–105)
GLUCOSE BLD-MCNC: 234 MG/DL (ref 65–105)
GLUCOSE BLD-MCNC: 270 MG/DL (ref 70–99)
HCT VFR BLD CALC: 46.4 % (ref 36.3–47.1)
HEMOGLOBIN: 15.5 G/DL (ref 11.9–15.1)
MCH RBC QN AUTO: 30.6 PG (ref 25.2–33.5)
MCHC RBC AUTO-ENTMCNC: 33.4 G/DL (ref 28.4–34.8)
MCV RBC AUTO: 91.7 FL (ref 82.6–102.9)
NRBC AUTOMATED: 0 PER 100 WBC
PDW BLD-RTO: 12.7 % (ref 11.8–14.4)
PLATELET # BLD: 368 K/UL (ref 138–453)
PMV BLD AUTO: 9.7 FL (ref 8.1–13.5)
POTASSIUM SERPL-SCNC: 3.6 MMOL/L (ref 3.7–5.3)
RBC # BLD: 5.06 M/UL (ref 3.95–5.11)
SEDIMENTATION RATE, ERYTHROCYTE: 7 MM (ref 0–20)
SODIUM BLD-SCNC: 138 MMOL/L (ref 135–144)
VITAMIN B-12: 351 PG/ML (ref 232–1245)
WBC # BLD: 13.4 K/UL (ref 3.5–11.3)

## 2021-12-22 PROCEDURE — 86235 NUCLEAR ANTIGEN ANTIBODY: CPT

## 2021-12-22 PROCEDURE — 80048 BASIC METABOLIC PNL TOTAL CA: CPT

## 2021-12-22 PROCEDURE — 86038 ANTINUCLEAR ANTIBODIES: CPT

## 2021-12-22 PROCEDURE — 82607 VITAMIN B-12: CPT

## 2021-12-22 PROCEDURE — 6370000000 HC RX 637 (ALT 250 FOR IP): Performed by: INTERNAL MEDICINE

## 2021-12-22 PROCEDURE — 1200000000 HC SEMI PRIVATE

## 2021-12-22 PROCEDURE — 96372 THER/PROPH/DIAG INJ SC/IM: CPT

## 2021-12-22 PROCEDURE — 85652 RBC SED RATE AUTOMATED: CPT

## 2021-12-22 PROCEDURE — 6360000002 HC RX W HCPCS: Performed by: INTERNAL MEDICINE

## 2021-12-22 PROCEDURE — 2580000003 HC RX 258: Performed by: NURSE PRACTITIONER

## 2021-12-22 PROCEDURE — 96365 THER/PROPH/DIAG IV INF INIT: CPT

## 2021-12-22 PROCEDURE — 86225 DNA ANTIBODY NATIVE: CPT

## 2021-12-22 PROCEDURE — G0378 HOSPITAL OBSERVATION PER HR: HCPCS

## 2021-12-22 PROCEDURE — 2580000003 HC RX 258: Performed by: PSYCHIATRY & NEUROLOGY

## 2021-12-22 PROCEDURE — 99222 1ST HOSP IP/OBS MODERATE 55: CPT | Performed by: PSYCHIATRY & NEUROLOGY

## 2021-12-22 PROCEDURE — 6370000000 HC RX 637 (ALT 250 FOR IP): Performed by: NURSE PRACTITIONER

## 2021-12-22 PROCEDURE — 36415 COLL VENOUS BLD VENIPUNCTURE: CPT

## 2021-12-22 PROCEDURE — 82746 ASSAY OF FOLIC ACID SERUM: CPT

## 2021-12-22 PROCEDURE — 99232 SBSQ HOSP IP/OBS MODERATE 35: CPT | Performed by: INTERNAL MEDICINE

## 2021-12-22 PROCEDURE — 6360000002 HC RX W HCPCS: Performed by: PSYCHIATRY & NEUROLOGY

## 2021-12-22 PROCEDURE — 83036 HEMOGLOBIN GLYCOSYLATED A1C: CPT

## 2021-12-22 PROCEDURE — 82947 ASSAY GLUCOSE BLOOD QUANT: CPT

## 2021-12-22 PROCEDURE — 71045 X-RAY EXAM CHEST 1 VIEW: CPT

## 2021-12-22 PROCEDURE — 85027 COMPLETE CBC AUTOMATED: CPT

## 2021-12-22 PROCEDURE — 6360000002 HC RX W HCPCS: Performed by: NURSE PRACTITIONER

## 2021-12-22 PROCEDURE — 96376 TX/PRO/DX INJ SAME DRUG ADON: CPT

## 2021-12-22 RX ORDER — LIRAGLUTIDE 6 MG/ML
3 INJECTION, SOLUTION SUBCUTANEOUS DAILY
COMMUNITY

## 2021-12-22 RX ORDER — ERENUMAB-AOOE 70 MG/ML
140 INJECTION SUBCUTANEOUS
COMMUNITY

## 2021-12-22 RX ORDER — DEXTROAMPHETAMINE SACCHARATE, AMPHETAMINE ASPARTATE MONOHYDRATE, DEXTROAMPHETAMINE SULFATE AND AMPHETAMINE SULFATE 2.5; 2.5; 2.5; 2.5 MG/1; MG/1; MG/1; MG/1
10 CAPSULE, EXTENDED RELEASE ORAL DAILY PRN
Status: ON HOLD | COMMUNITY
End: 2021-12-23 | Stop reason: HOSPADM

## 2021-12-22 RX ORDER — METOCLOPRAMIDE HYDROCHLORIDE 5 MG/ML
10 INJECTION INTRAMUSCULAR; INTRAVENOUS EVERY 6 HOURS PRN
Status: DISCONTINUED | OUTPATIENT
Start: 2021-12-22 | End: 2021-12-23 | Stop reason: HOSPADM

## 2021-12-22 RX ORDER — ONDANSETRON 4 MG/1
4 TABLET, ORALLY DISINTEGRATING ORAL DAILY PRN
COMMUNITY

## 2021-12-22 RX ORDER — DEXTROAMPHETAMINE SACCHARATE, AMPHETAMINE ASPARTATE MONOHYDRATE, DEXTROAMPHETAMINE SULFATE AND AMPHETAMINE SULFATE 7.5; 7.5; 7.5; 7.5 MG/1; MG/1; MG/1; MG/1
30 CAPSULE, EXTENDED RELEASE ORAL EVERY MORNING
Status: ON HOLD | COMMUNITY
End: 2021-12-23 | Stop reason: HOSPADM

## 2021-12-22 RX ORDER — OXYCODONE HYDROCHLORIDE AND ACETAMINOPHEN 5; 325 MG/1; MG/1
1 TABLET ORAL EVERY 4 HOURS PRN
Status: DISCONTINUED | OUTPATIENT
Start: 2021-12-22 | End: 2021-12-23

## 2021-12-22 RX ORDER — MODAFINIL 200 MG/1
200 TABLET ORAL 2 TIMES DAILY
Status: DISCONTINUED | OUTPATIENT
Start: 2021-12-22 | End: 2021-12-23 | Stop reason: HOSPADM

## 2021-12-22 RX ORDER — UBROGEPANT 100 MG/1
100 TABLET ORAL PRN
COMMUNITY

## 2021-12-22 RX ADMIN — ROPINIROLE HYDROCHLORIDE 2 MG: 1 TABLET, FILM COATED ORAL at 20:53

## 2021-12-22 RX ADMIN — SODIUM CHLORIDE, PRESERVATIVE FREE 10 ML: 5 INJECTION INTRAVENOUS at 09:33

## 2021-12-22 RX ADMIN — VENLAFAXINE 37.5 MG: 37.5 TABLET ORAL at 20:53

## 2021-12-22 RX ADMIN — SODIUM CHLORIDE, PRESERVATIVE FREE 10 ML: 5 INJECTION INTRAVENOUS at 20:54

## 2021-12-22 RX ADMIN — ENOXAPARIN SODIUM 30 MG: 100 INJECTION SUBCUTANEOUS at 20:53

## 2021-12-22 RX ADMIN — INSULIN LISPRO 2 UNITS: 100 INJECTION, SOLUTION INTRAVENOUS; SUBCUTANEOUS at 08:03

## 2021-12-22 RX ADMIN — SODIUM CHLORIDE, PRESERVATIVE FREE 10 ML: 5 INJECTION INTRAVENOUS at 09:34

## 2021-12-22 RX ADMIN — METHYLPREDNISOLONE SODIUM SUCCINATE 125 MG: 125 INJECTION, POWDER, FOR SOLUTION INTRAMUSCULAR; INTRAVENOUS at 09:33

## 2021-12-22 RX ADMIN — ONDANSETRON 4 MG: 2 INJECTION INTRAMUSCULAR; INTRAVENOUS at 22:37

## 2021-12-22 RX ADMIN — INSULIN LISPRO 3 UNITS: 100 INJECTION, SOLUTION INTRAVENOUS; SUBCUTANEOUS at 17:10

## 2021-12-22 RX ADMIN — SODIUM CHLORIDE 500 MG: 9 INJECTION, SOLUTION INTRAVENOUS at 22:37

## 2021-12-22 RX ADMIN — INSULIN LISPRO 2 UNITS: 100 INJECTION, SOLUTION INTRAVENOUS; SUBCUTANEOUS at 12:57

## 2021-12-22 RX ADMIN — METHYLPREDNISOLONE SODIUM SUCCINATE 125 MG: 125 INJECTION, POWDER, FOR SOLUTION INTRAMUSCULAR; INTRAVENOUS at 14:44

## 2021-12-22 RX ADMIN — ENOXAPARIN SODIUM 30 MG: 100 INJECTION SUBCUTANEOUS at 09:33

## 2021-12-22 RX ADMIN — TIZANIDINE 4 MG: 4 TABLET ORAL at 19:09

## 2021-12-22 RX ADMIN — OXYCODONE AND ACETAMINOPHEN 1 TABLET: 5; 325 TABLET ORAL at 16:36

## 2021-12-22 RX ADMIN — METHYLPREDNISOLONE SODIUM SUCCINATE 125 MG: 125 INJECTION, POWDER, FOR SOLUTION INTRAMUSCULAR; INTRAVENOUS at 03:48

## 2021-12-22 RX ADMIN — ACETAMINOPHEN 650 MG: 325 TABLET ORAL at 09:32

## 2021-12-22 RX ADMIN — HYDROMORPHONE HYDROCHLORIDE 1 MG: 1 INJECTION, SOLUTION INTRAMUSCULAR; INTRAVENOUS; SUBCUTANEOUS at 02:37

## 2021-12-22 RX ADMIN — HYDROMORPHONE HYDROCHLORIDE 0.5 MG: 1 INJECTION, SOLUTION INTRAMUSCULAR; INTRAVENOUS; SUBCUTANEOUS at 09:32

## 2021-12-22 RX ADMIN — TOPIRAMATE 25 MG: 25 TABLET, FILM COATED ORAL at 20:53

## 2021-12-22 RX ADMIN — SODIUM CHLORIDE, PRESERVATIVE FREE 10 ML: 5 INJECTION INTRAVENOUS at 14:45

## 2021-12-22 RX ADMIN — ONDANSETRON 4 MG: 4 TABLET, ORALLY DISINTEGRATING ORAL at 08:03

## 2021-12-22 RX ADMIN — INSULIN LISPRO 2 UNITS: 100 INJECTION, SOLUTION INTRAVENOUS; SUBCUTANEOUS at 20:54

## 2021-12-22 RX ADMIN — ROPINIROLE HYDROCHLORIDE 2 MG: 1 TABLET, FILM COATED ORAL at 09:33

## 2021-12-22 RX ADMIN — OXYCODONE AND ACETAMINOPHEN 1 TABLET: 5; 325 TABLET ORAL at 20:53

## 2021-12-22 ASSESSMENT — PAIN DESCRIPTION - DESCRIPTORS
DESCRIPTORS: ACHING

## 2021-12-22 ASSESSMENT — PAIN - FUNCTIONAL ASSESSMENT
PAIN_FUNCTIONAL_ASSESSMENT: ACTIVITIES ARE NOT PREVENTED

## 2021-12-22 ASSESSMENT — PAIN DESCRIPTION - ONSET
ONSET: ON-GOING

## 2021-12-22 ASSESSMENT — PAIN DESCRIPTION - PROGRESSION
CLINICAL_PROGRESSION: NOT CHANGED

## 2021-12-22 ASSESSMENT — PAIN SCALES - GENERAL
PAINLEVEL_OUTOF10: 7
PAINLEVEL_OUTOF10: 5
PAINLEVEL_OUTOF10: 8
PAINLEVEL_OUTOF10: 4
PAINLEVEL_OUTOF10: 8

## 2021-12-22 ASSESSMENT — PAIN DESCRIPTION - PAIN TYPE
TYPE: ACUTE PAIN

## 2021-12-22 ASSESSMENT — PAIN DESCRIPTION - LOCATION
LOCATION: GENERALIZED
LOCATION: HEAD
LOCATION: HEAD

## 2021-12-22 ASSESSMENT — PAIN DESCRIPTION - FREQUENCY
FREQUENCY: CONTINUOUS

## 2021-12-22 NOTE — PROGRESS NOTES
[facility]  Office: [address], DO, Malinda Bravo, DO, Zafar Mirian, DO, Guerda Abbe Bui, DO, Adelina Caraballo MD, Dori Do MD, Nakia Peacock MD, Hans Aguilar MD, Libia Cr MD, Hannah Crowe MD, Onur Hendrickson MD, Ed Richardson, DO, Maria Dolores Espinosa DO, Giuliana Villavicencio MD,  Hiwot Fuller, DO, Rosy Schirmer, MD, Marla Kirby MD, Kaela Boss MD, Melony Dillard MD, Charley Lyn MD, Annalise Kurtz MD, Velta Bernheim, MD, Quita Randall [facility], [facility], CNP, Olman Garcia, CNP, Navi Burrell, CNS, Bam Curry, CNP, Rosemarie Limon, CNP, Jennie Calvillo, CNP, Samantha Licea, CNP, Geneva Barfield CNP, Troi Posadas PA-C, Clem Shaffer, DNP, Keisha Whitaker DNP, Nancy Spaulding, CNP, Lesia Correia, CNP, Vamsi Hernandez, CNP, Tiara Francisco CNP, Zeynep Dey, CNP, Simona Valles [facility]    Progress Note    12/22/2021    3:25 PM    Name:   Eleanor Muhammad  MRN:     [id number]     Acct:      [de-identified]   Room:   2025/2025-01   Day:  0  Admit Date:  12/21/2021  2:03 PM    PCP:   QIAN Bunch CNP  Code Status:  Full Code    Subjective:     C/C:   Chief Complaint   Patient presents with    Fatigue     hx of MS    Dizziness    Headache     Interval History Status: . Headache is slightly better with IV steroids  Neurology yet to evaluate  Complains of nausea with headache, just now got Zofran  Blood sugars have gone up270 with IV steroids, she has history of hyperglycemia previously also with IV steroids     Brief History:   Eleanor Muhammad is a 40 y.o.  Non- / non  female who presents with Fatigue (hx of MS), Dizziness, and Headache  Patient states she had the symptoms for few weeks now but in the past week it got worse, she was supposed to have IVIG infusion as recommended by her neurologist Dr. Alfreda Puri, however it has not been approved by her insurance yet, in the meantime she is feeling Migraines, Multiple sclerosis ([address] Restless leg syndrome, and Sleep apnea. Social History:   reports that she has never smoked. She has never used smokeless tobacco. She reports that she does not drink alcohol and does not use drugs. Family History:   Family History   Problem Relation Age of Onset    Heart Attack Father        Vitals:  /67   Pulse 96   Temp 97.3 °F (36.3 °C)   Resp 18   Ht 5' 8\" (1.727 m)   Wt 240 lb (108.9 kg)   LMP 10/16/2005   SpO2 94%   BMI 36.49 kg/m²   Temp (24hrs), Av.7 °F (36.5 °C), Min:97.3 °F (36.3 °C), Max:98.1 °F (36.7 °C)    Recent Labs     21  2147 21  0631 21  1055   POCGLU 204* 234* 220*       I/O (24Hr):     Intake/Output Summary (Last 24 hours) at 2021 1525  Last data filed at 2021 5635  Gross per 24 hour   Intake 1499.71 ml   Output 700 ml   Net 799.71 ml       Labs:  Hematology:  Recent Labs     21  1440 21  0628   WBC 6.1 13.4*   RBC 5.34* 5.06   HGB 16.3* 15.5*   HCT 48.5* 46.4   MCV 90.8 91.7   MCH 30.5 30.6   MCHC 33.6 33.4   RDW 12.9 12.7    368   MPV 9.4 9.7     Chemistry:  Recent Labs     21  1440 21  1621 21  1848 21  0628     --   --  138   K 3.7  --   --  3.6*     --   --  104   CO2 22  --   --  17*   GLUCOSE 93  --   --  270*   BUN 10  --   --  9   CREATININE 0.57  --   --  0.68   MG 2.1  --   --   --    ANIONGAP 12  --   --  17   LABGLOM >60  --   --  >60   GFRAA >60  --   --  >60   CALCIUM 9.0  --   --  8.7   TROPHS 6 <6 <6  --    MYOGLOBIN 31 30 37  --      Recent Labs     21  1440 21  2147 21  0631 21  1055   PROT 7.6  --   --   --    LABALBU 4.4  --   --   --    AST 47*  --   --   --    ALT 75*  --   --   --    ALKPHOS 101  --   --   --    BILITOT 0.73  --   --   --    LIPASE 25  --   --   --    POCGLU  --  204* 234* 220*     ABG:No results found for: POCPH, PHART, PH, POCPCO2, EKT7LFG, PCO2, POCPO2, PO2ART, PO2, POCHCO3, BHI4XHP, HCO3, NBEA, PBEA, BEART, BE, THGBART, THB, CKP0SQL, JCGH5XIR, D3SZWRIN, O2SAT, FIO2  Lab Results   Component Value Date/Time    SPECIAL NOT REPORTED 08/23/2021 10:15 AM     Lab Results   Component Value Date/Time    CULTURE NO GROWTH 08/23/2021 10:15 AM       Radiology:  XR CHEST PORTABLE    Result Date: 12/21/2021  No acute cardiopulmonary disease suspected. Physical Examination:        General appearance:  alert, cooperative and no distress  Mental Status:  oriented to person, place and time and anxious affect  Lungs:  clear to auscultation bilaterally, normal effort  Heart:  regular rate and rhythm, no murmur  Abdomen:  soft, nontender, nondistended, normal bowel sounds, no masses, hepatomegaly, splenomegaly  Extremities:  no edema, redness, tenderness in the calves  Skin:  no gross lesions, rashes, induration    Assessment:        Hospital Problems           Last Modified POA    * (Principal) Multiple sclerosis exacerbation ([facility] [address] 12/21/2021 Yes    Intractable migraine without aura and without status migrainosus 12/21/2021 Yes    Headachebifrontal 12/21/2021 Yes    Hypothyroidism 12/21/2021 Yes    Class 2 obesity due to excess calories with body mass index (BMI) of 36.0 to 36.9 in adult 12/21/2021 Yes    LAWRENCE (obstructive sleep apnea) 12/21/2021 Yes    Steroid-induced hyperglycemia 12/21/2021 Yes          Plan:        1. Continue IV steroids  2. Changed to high intensity insulin sliding scale and check A1c  3. Oral Percocet as needed in addition to IV Dilaudid as needed  4. Zofran as needed for nausea, DVT prophylaxis  5.  Neurology evaluation has already been requested       Desirae Grace MD  12/22/2021  3:25 PM

## 2021-12-22 NOTE — CONSULTS
39 yo lady with fatigue and exhaustion . She has diagnosis of multiple sclerosis followed by neurologist Dr Juan Mera on IV cladribine infusion twice per year also having migraines on aimovig SQ q month , topamax 100 mg po bid . She was at [facility] 3 weeks ago with her loosing her voice with dysphonia with disruptive headache given IV DHE and solumedrol easing headache complaints . She reports daily bifrontal headache currently grade 3 to 4 over 10 previously grade 8 over 10 during prior admission using ubrelvy as abortive  . She reports to have disruptive fatigue with exhaustion . This has markedly limited acitivity impending work . There is some generalized weakness complaint in relation to fatigue with no focal motor ,sensory , bulbar or visual complaints. She called Dr Dhiraj Martines office and was referred to hospital for MS exacerbation . She is on adderall 30 mg po qd for fatigue complaints that is now not effective . She received IV solumedrol 500 mg IVPB x 1 this admission the placed on 125 mg IVPB q 6 hours.   Significant medications  aimovig SQ q month , topamax 100 mg po bid ,zanaflex 4 mg po q 8 hours PRN , adderall 30 mg po qd      Past Medical History:   Diagnosis Date    Balance problem     COVID-19 virus infection 02/2021    Dizziness     History of depression     Hypothyroidism     Migraines     Multiple sclerosis (HCC)     Restless leg syndrome     Sleep apnea     patient does not wear CPAP       Past Surgical History:   Procedure Laterality Date    BACK SURGERY  08/23/2021    T10-11 posterior lateral fusion    CARPAL TUNNEL RELEASE Left 10-    CARPAL TUNNEL RELEASE Right 10-    CHOLECYSTECTOMY      HYSTERECTOMY      LAMINECTOMY N/A 8/23/2021    T10- T11  POSTERIOR LATERAL    FUSION  & INSTRUMENTATION    WITH NEUROMONITORING   EVOKES    NUVASIVE  2 C-ARM performed by Abhijeet Handley MD at [address] [place] [address]  2001       Family History   Problem Relation Age of Onset    Heart Attack Father        Social History     Socioeconomic History    Marital status: Single     Spouse name: None    Number of children: None    Years of education: None    Highest education level: None   Occupational History    None   Tobacco Use    Smoking status: Never Smoker    Smokeless tobacco: Never Used   Vaping Use    Vaping Use: Never used   Substance and Sexual Activity    Alcohol use: No    Drug use: No    Sexual activity: None   Other Topics Concern    None   Social History Narrative    None     Social Determinants of Health     Financial Resource Strain:     Difficulty of Paying Living Expenses: Not on file   Food Insecurity:     Worried About Running Out of Food in the Last Year: Not on file    Heath of Food in the Last Year: Not on file   Transportation Needs:     Lack of Transportation (Medical): Not on file    Lack of Transportation (Non-Medical):  Not on file   Physical Activity:     Days of Exercise per Week: Not on file    Minutes of Exercise per Session: Not on file   Stress:     Feeling of Stress : Not on file   Social Connections:     Frequency of Communication with Friends and Family: Not on file    Frequency of Social Gatherings with Friends and Family: Not on file    Attends Yarsanism Services: Not on file    Active Member of [address] [place] [address] [employer] or Organizations: Not on file    Attends Club or Organization Meetings: Not on file    Marital Status: Not on file   Intimate Partner Violence:     Fear of Current or Ex-Partner: Not on file    Emotionally Abused: Not on file    Physically Abused: Not on file    Sexually Abused: Not on file   Housing Stability:     Unable to Pay for Housing in the Last Year: Not on file    Number of [place] in the Last Year: Not on file    Unstable Housing in the Last Year: Not on file       Current Facility-Administered Medications   Medication Dose Route Frequency Provider Last Rate Last Admin    levothyroxine (SYNTHROID) tablet 25 mcg  25 mcg Oral Daily Laura Casanova, APRN - NP        rOPINIRole (REQUIP) tablet 2 mg  2 mg Oral BID Laura Casanova, APRN - NP   2 mg at 12/22/21 0933    sennosides-docusate sodium (SENOKOT-S) 8.6-50 MG tablet 1 tablet  1 tablet Oral BID Laura Caasnova, APRN - NP        tiZANidine (ZANAFLEX) tablet 4 mg  4 mg Oral Q8H PRN Laura Casanova, APRN - NP        topiramate (TOPAMAX) tablet 25 mg  25 mg Oral BID Laura Casanova, APRN - NP        venlafaxine [facility]) tablet 37.5 mg  37.5 mg Oral BID Laura Casanova, APRN - NP        sodium chloride flush 0.9 % injection 5-40 mL  5-40 mL IntraVENous 2 times per day Laura Casanova, APRN - NP   10 mL at 12/22/21 0933    sodium chloride flush 0.9 % injection 10 mL  10 mL IntraVENous PRN Laura Casanova, APRN - NP   10 mL at 12/22/21 0934    0.9 % sodium chloride infusion  25 mL IntraVENous PRN Laura Casanova, APRN - NP        potassium chloride (KLOR-CON M) extended release tablet 40 mEq  40 mEq Oral PRN Laura Casanova, APRN - NP        Or   Lei Samaritan potassium bicarb-citric acid (EFFER-K) effervescent tablet 40 mEq  40 mEq Oral PRN Laura Casanova, APRN - NP        Or   Lei [facility]aritan potassium chloride 10 mEq/100 mL IVPB (Peripheral Line)  10 mEq IntraVENous PRN Laura Casanova, APRN - NP        magnesium sulfate 1000 mg in dextrose 5% 100 mL IVPB  1,000 mg IntraVENous PRN Laura Casanova, APRN - NP        enoxaparin (LOVENOX) injection 30 mg  30 mg SubCUTAneous BID Laura Casanova, APRN - NP   30 mg at 12/22/21 0933    ondansetron (ZOFRAN-ODT) disintegrating tablet 4 mg  4 mg Oral Q8H PRN Laura Casanova, APRN - NP   4 mg at 12/22/21 0673    Or    ondansetron (ZOFRAN) injection 4 mg  4 mg IntraVENous Q6H PRN Laura Casanova, APRN - NP   4 mg at 12/21/21 8685    polyethylene glycol (GLYCOLAX) packet 17 g  17 g Oral Daily PRN QIAN Sena - NP        acetaminophen (TYLENOL) tablet 650 mg  650 mg Oral Q6H PRN QIAN Sena NP 650 mg at 12/22/21 0932    Or    acetaminophen (TYLENOL) suppository 650 mg  650 mg Rectal Q6H PRN QIAN Sequeira - NP        methylPREDNISolone sodium (SOLU-MEDROL) injection 125 mg  125 mg IntraVENous Q6H Virender Cody Burkitt, MD   125 mg at 12/22/21 0933    insulin lispro (HUMALOG) injection vial 0-6 Units  0-6 Units SubCUTAneous TID WC Virender Cody Burkitt, MD   2 Units at 12/22/21 1257    insulin lispro (HUMALOG) injection vial 0-3 Units  0-3 Units SubCUTAneous Nightly Flo Minor MD   1 Units at 12/21/21 2200    glucose (GLUTOSE) 40 % oral gel 15 g  15 g Oral PRN Virender Cody Burkitt, MD        dextrose 50 % IV solution  12.5 g IntraVENous PRN Virender Cody Burkitt, MD        glucagon (rDNA) injection 1 mg  1 mg IntraMUSCular PRN Virender Cody Burkitt, MD        dextrose 5 % solution  100 mL/hr IntraVENous PRN Virender Cody Burkitt, MD        HYDROmorphone (DILAUDID) injection 0.5 mg  0.5 mg IntraVENous Q4H PRN Virender Cody Burkitt, MD   0.5 mg at 12/22/21 0932    Or    HYDROmorphone HCl PF (DILAUDID) injection 1 mg  1 mg IntraVENous Q4H PRN Flo Minor MD   1 mg at 12/22/21 5014       Allergies   Allergen Reactions    Pcn [Penicillins] Rash       ROS:   Constitutional                   Positive for fatigue    HEENT                            Negative for ear discharge, ear pain, nosebleed  Eyes                                Negative for photophobia, pain and discharge  Respiratory                      Negative for hemoptysis and sputum  Cardiovascular                Negative for orthopnea, claudication and PND  Gastrointestinal               Negative for abdominal pain, diarrhea, blood in stool  Musculoskeletal               Negative for joint pain, negative for myalgia  Skin                                 Negative for rash or itching  Endo/heme/allergies       Negative for polydipsia, environmental allergy  Psychiatric                       Negative for suicidal ideation.   Patient is not anxious    Vitals: 12/22/21 1109   BP: 116/65   Pulse: 106   Resp: 18   Temp: 97.5 °F (36.4 °C)   SpO2: 96%     Admission weight: 240 lb (108.9 kg)    Neurological Examination  Constitutional .General exam well groomed   Head/ Ears /Nose/Throat/external ear . Normal exam  Neck and thyroid . Normal size. No bruits  Respiratory . Breathsounds clear bilaterally  Cardiovascular: Auscultation of heart with regular rate and rhythm   Musculoskeletal. Muscle bulk and tone normal                                                           Muscle strength 5/5 strength throughout                                                                                No dysmetria or dysdiadokinesis  No tremor   Normal fine motor  Orientation Alert and oriented x 3   Attention and concentration normal  Short term memory normal  Language process and speech normal . No aphasia   Cranial nerve 2 normal acuety and visual fields  Cranial nerve 3, 4 and 6 . Extraocular muscles are intact . Pupils are equal and reactive   Cranial nerve 5 . Intact corneal reflex. Normal facial sensation  Cranial nerve 7 normal exam   Cranial nerve 8. Grossly intact hearing   Cranial nerve 9 and 10. Symmetric palate elevation   Cranial nerve 11 , 5 out of 5 strength   Cranial Nerve 12 midline tongue . No atrophy  Sensation . Normal pinprick and light touch   Deep Tendon Reflexes normal  Plantar response flexor bilaterally    Assessment :    Multiple sclerosis . Fatigue with exhaustion . Symptoms appear more constitutional than MS exacerbation. Chronic daily headaches     Plan:    Finish solumedrol 500 mg IVPB q 12 hours x 6 as requested by Dr Obdulia Cordero . MRI of Head . MRI cervical spine with and without contrast . K50 , folic acid , ESR , HARRY . UA , CXR , COVID . D/C adderall .  Provigil 200 mg po bid q 8 AM and q 1 PM

## 2021-12-22 NOTE — PROGRESS NOTES
Transitions of Care Pharmacy Service   Medication Review    The patient's list of current home medications has been reviewed. Source(s) of information: patient, PCP office, Surescripts refill report, OARRS      Please feel free to call me with any questions about this encounter. Thank you. Akhil Palacios, [facility]   Transitions of Care Pharmacy Service  Phone:  [phone]  Fax: [phone]      Electronically signed by Akhil Palacios [facility] on 12/22/2021 at 6:24 PM           Medications Prior to Admission:   amphetamine-dextroamphetamine (ADDERALL XR) 30 MG extended release capsule, Take 30 mg by mouth every morning. amphetamine-dextroamphetamine (ADDERALL XR) 10 MG extended release capsule, Take 10 mg by mouth daily as needed.  Indications: in afternoon if needed  Erenumab-aooe (AIMOVIG, 140 MG DOSE,) 70 MG/ML SOAJ, Inject 140 mg into the skin every 30 days  Ubrogepant (UBRELVY) 100 MG TABS, Take 100 mg by mouth as needed (migraine)  ondansetron (ZOFRAN ODT) 4 MG disintegrating tablet, Take 4 mg by mouth daily as needed for Nausea or Vomiting  liraglutide-weight management (SAXENDA) 18 MG/3ML SOPN, Inject 3 mg into the skin daily   tiZANidine (ZANAFLEX) 4 MG tablet, Take 1 tablet by mouth every 8 hours as needed (spasm)  venlafaxine (EFFEXOR) 37.5 MG tablet, Take 37.5 mg by mouth 2 times daily  topiramate (TOPAMAX) 25 MG tablet, Take 25 mg by mouth 2 times daily   rOPINIRole (REQUIP) 2 MG tablet, Take 2 mg by mouth 2 times daily  levothyroxine (SYNTHROID) 25 MCG tablet, Take 25 mcg by mouth Daily   vitamin B-12 (CYANOCOBALAMIN) 100 MCG tablet, Take 100 mcg by mouth daily   NONFORMULARY, Infuse intravenously every 30 days Unsure of name but she thinks this is an IVIG infusion that is not yet approved by insurance, hasn't started yet

## 2021-12-22 NOTE — FLOWSHEET NOTE
Patient states no major needs, just got off the phone, states she is ready to go to sleep. Kind, yet declined visit at this time.  leaves prayer card for possible follow up.     12/21/21 2051   Encounter Summary   Services provided to: Patient   Referral/Consult From: [facility]   (12-21-21)   Complexity of Encounter Low   Length of Encounter 15 minutes   Routine   Type Initial   Assessment Calm; Approachable   Intervention Explored feelings, thoughts, concerns   Outcome Expressed gratitude; Refused/declined

## 2021-12-22 NOTE — CARE COORDINATION
Case Management Initial Discharge Plan  Matilde Escobar,             Met with:patient to discuss discharge plans. Information verified: address, contacts, phone number, , insurance Yes  Insurance Provider: [facility]    Emergency Contact/Next of Koby Cooper name & number: Alvarez/    [phone]  Who are involved in patient's support system? spouse    PCP: QIAN Wang CNP  Date of last visit: 1 month ago      Discharge Planning    Living Arrangements:  Spouse/Significant Other     Home has 1 stories  1 stairs to climb to get into front door, 0stairs to climb to reach second floor  Location of bedroom/bathroom in home main    Patient able to perform ADL's:Independent    Current Services (outpatient & in home) none  DME equipment: cane prn  DME provider: -    Is patient receiving oral anticoagulation therapy? No    If indicated:   Physician managing anticoagulation treatment:   Where does patient obtain lab work for ATC treatment? Potential Assistance Needed:  N/A    Patient agreeable to home care: No  [place] of choice provided:  n/a    Prior SNF/Rehab Placement and Facility: n/a  Agreeable to SNF/Rehab: No  [place] of choice provided: n/a     Evaluation: no    Expected Discharge date:  21    Patient expects to be discharged to: If home: is the family and/or caregiver wiling & able to provide support at home? yes  Who will be providing this support? Family and self    Follow Up Appointment: Best Day/ Time: Monday AM    Transportation provider: family  Transportation arrangements needed for discharge: No    Readmission Risk              Risk of Unplanned Readmission:  0             Does patient have a readmission risk score greater than 14?: No  If yes, follow-up appointment must be made within 7 days of discharge.      Goals of Care:       Educated patient on transitional options, provided freedom of choice and are agreeable with plan      Discharge Plan: MS

## 2021-12-22 NOTE — PROGRESS NOTES
Occupational Therapy   [facility]  Occupational Therapy Not Seen Note    Patient not available for Occupational Therapy due to:    [] Testing:    [] Hemodialysis    [] Cancelled by RN:    []Refusal by Patient:    [] Surgery:     [] Intubation:     [] Pain Medication:    [] Sedation:     [] Spine Precautions :    [] Medical Instability:    [x] Other: D/C OT, pt is independent    Zoraida Ear, OTR/L

## 2021-12-22 NOTE — PROGRESS NOTES
Physical Therapy  DATE: 2021    NAME: Eleanor Muhammad  MRN: [id number]   : 1975    Patient not seen this date for Physical Therapy due to:      [] Cancel by RN or physician due to:    [] Hemodialysis    [] Critical Lab Value Level     [] Blood transfusion in progress    [] Acute or unstable cardiovascular status   _MAP < 55 or more than >115  _HR < 40 or > 130    [] Acute or unstable pulmonary status   -FiO2 > 60%   _RR < 5 or >40    _O2 sats < 85%    [] Strict Bedrest    [] Off Unit for surgery or procedure    [] Off Unit for testing       [] Pending imaging to R/O fracture    [] Refusal by Patient      [] Other      [x] PT being discontinued at this time. Patient independent. No further needs. OT reports patient is indep in functional ambulation, no PT needs. [] PT being discontinued at this time as the patient has been transferred to hospice care. No further needs.       Nuvia Hooks, PT

## 2021-12-23 ENCOUNTER — APPOINTMENT (OUTPATIENT)
Dept: MRI IMAGING | Age: 46
DRG: 060 | End: 2021-12-23
Payer: COMMERCIAL

## 2021-12-23 VITALS
RESPIRATION RATE: 16 BRPM | HEART RATE: 76 BPM | HEIGHT: 68 IN | BODY MASS INDEX: 36.53 KG/M2 | WEIGHT: 241 LBS | SYSTOLIC BLOOD PRESSURE: 135 MMHG | DIASTOLIC BLOOD PRESSURE: 76 MMHG | TEMPERATURE: 97.8 F | OXYGEN SATURATION: 97 %

## 2021-12-23 LAB
ANTI DNA DOUBLE STRANDED: 1 IU/ML
ANTI-NUCLEAR ANTIBODY (ANA): POSITIVE
ENA ANTIBODIES SCREEN: 1.1 U/ML
ESTIMATED AVERAGE GLUCOSE: 111 MG/DL
GLUCOSE BLD-MCNC: 131 MG/DL (ref 65–105)
GLUCOSE BLD-MCNC: 150 MG/DL (ref 65–105)
GLUCOSE BLD-MCNC: 182 MG/DL (ref 65–105)
HBA1C MFR BLD: 5.5 % (ref 4–6)

## 2021-12-23 PROCEDURE — 6360000002 HC RX W HCPCS: Performed by: NURSE PRACTITIONER

## 2021-12-23 PROCEDURE — 6370000000 HC RX 637 (ALT 250 FOR IP): Performed by: NURSE PRACTITIONER

## 2021-12-23 PROCEDURE — 6360000002 HC RX W HCPCS: Performed by: PSYCHIATRY & NEUROLOGY

## 2021-12-23 PROCEDURE — 6370000000 HC RX 637 (ALT 250 FOR IP): Performed by: INTERNAL MEDICINE

## 2021-12-23 PROCEDURE — 82947 ASSAY GLUCOSE BLOOD QUANT: CPT

## 2021-12-23 PROCEDURE — 2580000003 HC RX 258: Performed by: PSYCHIATRY & NEUROLOGY

## 2021-12-23 PROCEDURE — G0378 HOSPITAL OBSERVATION PER HR: HCPCS

## 2021-12-23 PROCEDURE — 96366 THER/PROPH/DIAG IV INF ADDON: CPT

## 2021-12-23 PROCEDURE — 70553 MRI BRAIN STEM W/O & W/DYE: CPT

## 2021-12-23 PROCEDURE — 6360000004 HC RX CONTRAST MEDICATION: Performed by: PSYCHIATRY & NEUROLOGY

## 2021-12-23 PROCEDURE — 99232 SBSQ HOSP IP/OBS MODERATE 35: CPT | Performed by: PSYCHIATRY & NEUROLOGY

## 2021-12-23 PROCEDURE — 96372 THER/PROPH/DIAG INJ SC/IM: CPT

## 2021-12-23 PROCEDURE — 99232 SBSQ HOSP IP/OBS MODERATE 35: CPT | Performed by: INTERNAL MEDICINE

## 2021-12-23 PROCEDURE — A9579 GAD-BASE MR CONTRAST NOS,1ML: HCPCS | Performed by: PSYCHIATRY & NEUROLOGY

## 2021-12-23 PROCEDURE — 6370000000 HC RX 637 (ALT 250 FOR IP): Performed by: PSYCHIATRY & NEUROLOGY

## 2021-12-23 PROCEDURE — 72156 MRI NECK SPINE W/O & W/DYE: CPT

## 2021-12-23 RX ORDER — FOLIC ACID 1 MG/1
1 TABLET ORAL DAILY
Status: DISCONTINUED | OUTPATIENT
Start: 2021-12-23 | End: 2021-12-23 | Stop reason: HOSPADM

## 2021-12-23 RX ORDER — OXYCODONE HYDROCHLORIDE AND ACETAMINOPHEN 5; 325 MG/1; MG/1
1 TABLET ORAL EVERY 4 HOURS PRN
Status: DISCONTINUED | OUTPATIENT
Start: 2021-12-23 | End: 2021-12-23 | Stop reason: HOSPADM

## 2021-12-23 RX ORDER — MODAFINIL 200 MG/1
200 TABLET ORAL 2 TIMES DAILY
Qty: 60 TABLET | Refills: 0 | Status: SHIPPED | OUTPATIENT
Start: 2021-12-24 | End: 2022-01-23

## 2021-12-23 RX ORDER — FOLIC ACID 1 MG/1
1 TABLET ORAL DAILY
Qty: 30 TABLET | Refills: 3 | Status: SHIPPED | OUTPATIENT
Start: 2021-12-24

## 2021-12-23 RX ORDER — OXYCODONE HYDROCHLORIDE AND ACETAMINOPHEN 5; 325 MG/1; MG/1
2 TABLET ORAL EVERY 4 HOURS PRN
Status: DISCONTINUED | OUTPATIENT
Start: 2021-12-23 | End: 2021-12-23 | Stop reason: HOSPADM

## 2021-12-23 RX ADMIN — TOPIRAMATE 25 MG: 25 TABLET, FILM COATED ORAL at 09:26

## 2021-12-23 RX ADMIN — VENLAFAXINE 37.5 MG: 37.5 TABLET ORAL at 09:25

## 2021-12-23 RX ADMIN — GADOTERIDOL 20 ML: 279.3 INJECTION, SOLUTION INTRAVENOUS at 09:13

## 2021-12-23 RX ADMIN — OXYCODONE AND ACETAMINOPHEN 1 TABLET: 5; 325 TABLET ORAL at 02:32

## 2021-12-23 RX ADMIN — SODIUM CHLORIDE 500 MG: 9 INJECTION, SOLUTION INTRAVENOUS at 11:16

## 2021-12-23 RX ADMIN — OXYCODONE AND ACETAMINOPHEN 1 TABLET: 5; 325 TABLET ORAL at 06:39

## 2021-12-23 RX ADMIN — OXYCODONE AND ACETAMINOPHEN 1 TABLET: 5; 325 TABLET ORAL at 11:17

## 2021-12-23 RX ADMIN — FOLIC ACID 1 MG: 1 TABLET ORAL at 11:22

## 2021-12-23 RX ADMIN — ROPINIROLE HYDROCHLORIDE 2 MG: 1 TABLET, FILM COATED ORAL at 09:25

## 2021-12-23 RX ADMIN — INSULIN LISPRO 3 UNITS: 100 INJECTION, SOLUTION INTRAVENOUS; SUBCUTANEOUS at 09:25

## 2021-12-23 RX ADMIN — ONDANSETRON 4 MG: 4 TABLET, ORALLY DISINTEGRATING ORAL at 11:34

## 2021-12-23 RX ADMIN — MODAFINIL 200 MG: 200 TABLET ORAL at 09:26

## 2021-12-23 RX ADMIN — ENOXAPARIN SODIUM 30 MG: 100 INJECTION SUBCUTANEOUS at 09:25

## 2021-12-23 RX ADMIN — LEVOTHYROXINE SODIUM 25 MCG: 0.03 TABLET ORAL at 06:39

## 2021-12-23 RX ADMIN — MODAFINIL 200 MG: 200 TABLET ORAL at 13:22

## 2021-12-23 ASSESSMENT — PAIN SCALES - GENERAL
PAINLEVEL_OUTOF10: 6
PAINLEVEL_OUTOF10: 8
PAINLEVEL_OUTOF10: 5
PAINLEVEL_OUTOF10: 7

## 2021-12-23 ASSESSMENT — PAIN DESCRIPTION - ONSET: ONSET: ON-GOING

## 2021-12-23 ASSESSMENT — PAIN DESCRIPTION - FREQUENCY: FREQUENCY: CONTINUOUS

## 2021-12-23 ASSESSMENT — PAIN DESCRIPTION - PROGRESSION: CLINICAL_PROGRESSION: NOT CHANGED

## 2021-12-23 ASSESSMENT — PAIN DESCRIPTION - DESCRIPTORS: DESCRIPTORS: ACHING;DISCOMFORT

## 2021-12-23 ASSESSMENT — PAIN - FUNCTIONAL ASSESSMENT: PAIN_FUNCTIONAL_ASSESSMENT: ACTIVITIES ARE NOT PREVENTED

## 2021-12-23 ASSESSMENT — PAIN DESCRIPTION - PAIN TYPE: TYPE: ACUTE PAIN

## 2021-12-23 ASSESSMENT — PAIN DESCRIPTION - LOCATION: LOCATION: HEAD

## 2021-12-23 NOTE — PROGRESS NOTES
[facility]  Office: [address], DO, Gwen Nyhan, DO, Valentina Quiñonse, DO, Nelson Kam Blood, DO, Mile Martínez MD, Kimberley Frazier MD, Ry Mccallum MD, Dino Pineda MD, Marques Will MD, Dusty Esparza MD, Shoshana Montalvo MD, Maco Salamanca, DO, Gayathri Dumont, DO, Evelyne Hickman MD,  Linda Dimas, DO, Phillip Joyner MD, Deep Reina MD, Ruth Billingsley MD, Diane Matias MD, Huong Jimenez MD, Stephanie Mallory MD, Chirag Arriaga MD, Etienne Weaver, [facility], [place], CNP, Aarti Ramon, CNP, Ann Burrows, CNS, Ashvin Jacinto, CNP, Bethany Blevins, CNP, Olimpia Silva, CNP, Tobi Mir, CNP, Shelby Zepeda, CNP, Erin Wu PA-C, Jeanine Ayon, [facility], Massimo Goldstein, [facility], Dashawn Patricia, CNP, Juan Cano, CNP, Natali Keith, CNP, Adam Cooper, CNP, Clover Ha, CNP, Miranda Hanks, [facility]    Progress Note    12/23/2021    1:38 PM    Name:   Sree Carr  MRN:     [id number]     Acct:      [de-identified]   Room:   2025/2025-01   Day:  2  Admit Date:  12/21/2021  2:03 PM    PCP:   QIAN King CNP  Code Status:  Full Code    Subjective:     C/C:   Chief Complaint   Patient presents with    Fatigue     hx of MS    Dizziness    Headache     Interval History Status: . Headache is 50% better with high-dose IV steroidstotal 6 doses  recommended by neurology  Nausea is better today  Blood sugars had gone up270 with IV steroids, she has history of hyperglycemia previously also with IV steroids   Current blood glucose 131, A1c 5.5  Brief History:   Sree Carr is a 40 y.o.  Non- / non  female who presents with Fatigue (hx of MS), Dizziness, and Headache  Patient states she had the symptoms for few weeks now but in the past week it got worse, she was supposed to have IVIG infusion as recommended by her neurologist Dr. Mihai Christine, however it has not been approved by her insurance yet, in the meantime she is feeling much worse because of which Dr. Henrietta Klein recommended her to come to hospital and get admitted for starting high-dose of steroids and IVIG if needed  Patient denies any nausea or vomiting  She does complain of generalized fatigue and bifrontal headache  During previous admissions with high-dose of steroids her sugars had gone up her current blood glucose is 93      Review of Systems:     Constitutional:  negative for chills, fevers, sweats  Respiratory:  negative for cough, dyspnea on exertion, shortness of breath, wheezing  Cardiovascular:  negative for chest pain, chest pressure/discomfort, lower extremity edema, palpitations  Gastrointestinal:  negative for abdominal pain, constipation, diarrhea, + nausea, denies vomiting  Neurological:  negative for dizziness,++ headache    Medications: Allergies:     Allergies   Allergen Reactions    Pcn [Penicillins] Rash       Current Meds:   Scheduled Meds:    folic acid  1 mg Oral Daily    insulin lispro  0-18 Units SubCUTAneous TID WC    insulin lispro  0-9 Units SubCUTAneous Nightly    modafinil  200 mg Oral BID    methylPREDNISolone  500 mg IntraVENous Q12H    levothyroxine  25 mcg Oral Daily    rOPINIRole  2 mg Oral BID    sennosides-docusate sodium  1 tablet Oral BID    topiramate  25 mg Oral BID    venlafaxine  37.5 mg Oral BID    sodium chloride flush  5-40 mL IntraVENous 2 times per day    enoxaparin  30 mg SubCUTAneous BID     Continuous Infusions:    sodium chloride      dextrose       PRN Meds: oxyCODONE-acetaminophen, metoclopramide, tiZANidine, sodium chloride flush, sodium chloride, potassium chloride **OR** potassium alternative oral replacement **OR** potassium chloride, magnesium sulfate, ondansetron **OR** ondansetron, polyethylene glycol, acetaminophen **OR** acetaminophen, glucose, dextrose, glucagon (rDNA), dextrose, HYDROmorphone **OR** HYDROmorphone    Data:     Past Medical History:   has a past medical history of Balance problem, COVID-19 virus infection, Dizziness, History of depression, Hypothyroidism, Migraines, Multiple sclerosis ([address] Restless leg syndrome, and Sleep apnea. Social History:   reports that she has never smoked. She has never used smokeless tobacco. She reports that she does not drink alcohol and does not use drugs. Family History:   Family History   Problem Relation Age of Onset    Heart Attack Father        Vitals:  /65   Pulse 76   Temp 97.3 °F (36.3 °C) (Oral)   Resp 17   Ht 5' 8\" (1.727 m)   Wt 241 lb (109.3 kg)   LMP 10/16/2005   SpO2 98%   BMI 36.64 kg/m²   Temp (24hrs), Av.4 °F (36.3 °C), Min:97.3 °F (36.3 °C), Max:97.6 °F (36.4 °C)    Recent Labs     21  1616 21  2020 21  0756 21  1124   POCGLU 155* 156* 182* 131*       I/O (24Hr):     Intake/Output Summary (Last 24 hours) at 2021 1338  Last data filed at 2021 1116  Gross per 24 hour   Intake 148.42 ml   Output 800 ml   Net -651.58 ml       Labs:  Hematology:  Recent Labs     21  1440 21  0628 21  1717   WBC 6.1 13.4*  --    RBC 5.34* 5.06  --    HGB 16.3* 15.5*  --    HCT 48.5* 46.4  --    MCV 90.8 91.7  --    MCH 30.5 30.6  --    MCHC 33.6 33.4  --    RDW 12.9 12.7  --     368  --    MPV 9.4 9.7  --    SEDRATE  --   --  7     Chemistry:  Recent Labs     21  1440 21  1621 21  1848 21  0628     --   --  138   K 3.7  --   --  3.6*     --   --  104   CO2 22  --   --  17*   GLUCOSE 93  --   --  270*   BUN 10  --   --  9   CREATININE 0.57  --   --  0.68   MG 2.1  --   --   --    ANIONGAP 12  --   --  17   LABGLOM >60  --   --  >60   GFRAA >60  --   --  >60   CALCIUM 9.0  --   --  8.7   TROPHS 6 <6 <6  --    MYOGLOBIN 31 30 37  --      Recent Labs     21  1440 21  2147 21  0628 21  0631 21  1055 21  1616 21  2020 21  0756 21  1124   PROT 7.6  --   --   --   --   --   --   -- --    LABALBU 4.4  --   --   --   --   --   --   --   --    LABA1C  --   --  5.5  --   --   --   --   --   --    AST 47*  --   --   --   --   --   --   --   --    ALT 75*  --   --   --   --   --   --   --   --    ALKPHOS 101  --   --   --   --   --   --   --   --    BILITOT 0.73  --   --   --   --   --   --   --   --    LIPASE 25  --   --   --   --   --   --   --   --    POCGLU  --    < >  --  234* 220* 155* 156* 182* 131*    < > = values in this interval not displayed. ABG:No results found for: POCPH, PHART, PH, POCPCO2, KZS9QMK, PCO2, POCPO2, PO2ART, PO2, POCHCO3, GSW3MHW, HCO3, NBEA, PBEA, BEART, BE, THGBART, THB, NAL8DHC, HCGT0GIY, P8XTDBRR, O2SAT, FIO2  Lab Results   Component Value Date/Time    SPECIAL NOT REPORTED 08/23/2021 10:15 AM     Lab Results   Component Value Date/Time    CULTURE NO GROWTH 08/23/2021 10:15 AM       Radiology:  XR CHEST PORTABLE    Result Date: 12/21/2021  No acute cardiopulmonary disease suspected.        Physical Examination:        General appearance:  alert, cooperative and no distress, obese  Mental Status:  oriented to person, place and time and anxious affect  Lungs:  clear to auscultation bilaterally, normal effort  Heart:  regular rate and rhythm, no murmur  Abdomen:  soft, nontender, nondistended, normal bowel sounds, no masses, hepatomegaly, splenomegaly  Extremities:  no edema, redness, tenderness in the calves  Skin:  no gross lesions, rashes, induration    Assessment:        Hospital Problems           Last Modified POA    * (Principal) [facility] 12/21/2021 Yes    Intractable migraine without aura and without status migrainosus 12/21/2021 Yes    Headachebifrontal 12/21/2021 Yes    Hypothyroidism 12/21/2021 Yes    Class 2 obesity due to excess calories with body mass index (BMI) of 36.0 to 36.9 in adult 12/21/2021 Yes    LAWRENCE (obstructive sleep apnea) 12/21/2021 Yes    Steroid-induced hyperglycemia 12/21/2021 Yes          Plan: 1. Continue high-dose IV steroids500 mg every 12 hours, total 6 doses as recommended by neurology  2. Changed to high intensity insulin sliding scale   3. Oral Percocet as needed in addition to IV Dilaudid as needed  4. Zofran/Reglan as needed for nausea,  5.   DVT prophylaxis         Cindy Chávez MD  12/23/2021  1:38 PM

## 2021-12-23 NOTE — DISCHARGE SUMMARY
[facility]  Office: [address], DO, Eneida Din, DO, Greg Acron, DO, Fariba Melvin Blood, DO, Milana Medina MD, Guido Marcano MD, Arlene Bennett MD, Eric Godinez MD, Yanelis Reina MD, Lorne Bustamante MD, Melissa Renteria MD, Dannie Cruz, DO, Ross Medellin, DO, Mckenzie Tim MD,  Pankaj Coronado, DO, Brodie Perdue MD, Pham Bains MD, Maria Del Carmen Alegria MD, Herve Ramos MD, Eber Garcia MD, Virginia Napoles MD, Cyndy Wallace MD, Bekah Manzo, [facility], [facility], CNP, Madonna Ellis, CNP, Petty Lincoln, CNS, Pascual Lynn, CNP, Adelia Alpers, CNP, Yaw Black, CNP, Naya Sin, CNP, Hortencia Lopez, CNP, Gregorio Carrasco PA-C, Keena Juarez, [facility], Rafael Vance, [facility], Gucci Watson, CNP, Josephine Sheth, CNP, Sridevi Diez, CNP, Jayme Espinoza, CNP, Adam Rangel, [facility], Ching Harding, [facility]    Discharge Summary     Patient ID: Joanna Trejo  :  1975   MRN: [id number]     ACCOUNT:  [de-identified]   Patient's PCP: QIAN Stevenson CNP  Admit Date: 2021   Discharge Date: 2021     Length of Stay: 2  Code Status:  Full Code  Admitting Physician: Josep Eisenberg MD  Discharge Physician: Josep Eisenberg MD     Active Discharge Diagnoses:     Hospital Problem Lists:  Principal Problem:    Multiple sclerosis exacerbation [facility])  Active Problems:    Intractable migraine without aura and without status migrainosus    Headachebifrontal    Hypothyroidism    Class 2 obesity due to excess calories with body mass index (BMI) of 36.0 to 36.9 in adult    LAWRENCE (obstructive sleep apnea)    Steroid-induced hyperglycemia  Resolved Problems:    * No resolved hospital problems.  *      Admission Condition:  poor     Discharged Condition: stable    Hospital Stay:   Admitting history:  Shae Rojo a 40 y.o. Non- / non  female who presents with Fatigue (hx of MS), Dizziness, and Headache  Patient states she had the symptoms for few weeks now but in the past week it got worse, she was supposed to have IVIG infusion as recommended by her neurologist Dr. Vianney Elena, however it has not been approved by her insurance yet, in the meantime she is feeling much worse because of which Dr. Vianney Elena recommended her to come to hospital and get admitted for starting high-dose of steroids and IVIG if needed  Patient denies any nausea or vomiting  She does complain of generalized fatigue and bifrontal headache  During previous admissions with high-dose of steroids her sugars had gone up her current blood glucose is [address] Course:   Headache is 50% better with high-dose IV steroidstotal 6 doses  recommended by neurology  Nausea is better today  Blood sugars had gone up270 with IV steroids, she has history of hyperglycemia previously also with IV steroids   Current blood glucose 131, A1c 5.5    Plan:         1. Neurology evaluated the patient today, consider symptoms appear more constitutional none MS exacerbation with chronic daily headaches  2. Recommended to discharge on Provigil 200 mg twice daily q. 8 AM and q. 1 PM  3.  Outpatient second opinion with MS center at [facility]        Significant therapeutic interventions: See above notes    Significant Diagnostic Studies:   Labs / Micro:  CBC:   Lab Results   Component Value Date    WBC 13.4 12/22/2021    RBC 5.06 12/22/2021    HGB 15.5 12/22/2021    HCT 46.4 12/22/2021    MCV 91.7 12/22/2021    MCH 30.6 12/22/2021    MCHC 33.4 12/22/2021    RDW 12.7 12/22/2021     12/22/2021     BMP:    Lab Results   Component Value Date    GLUCOSE 270 12/22/2021     12/22/2021    K 3.6 12/22/2021     12/22/2021    CO2 17 12/22/2021    ANIONGAP 17 12/22/2021    BUN 9 12/22/2021    CREATININE 0.68 12/22/2021    BUNCRER 13 12/22/2021    CALCIUM 8.7 12/22/2021    LABGLOM >60 12/22/2021    GFRAA >60 12/22/2021    GFR      12/22/2021    GFR NOT REPORTED 12/22/2021     HFP:    Lab Results   Component Value Date    PROT 7.6 12/21/2021     CMP:    Lab Results   Component Value Date    GLUCOSE 270 12/22/2021     12/22/2021    K 3.6 12/22/2021     12/22/2021    CO2 17 12/22/2021    BUN 9 12/22/2021    CREATININE 0.68 12/22/2021    ANIONGAP 17 12/22/2021    ALKPHOS 101 12/21/2021    ALT 75 12/21/2021    AST 47 12/21/2021    BILITOT 0.73 12/21/2021    LABALBU 4.4 12/21/2021    ALBUMIN NOT REPORTED 12/21/2021    LABGLOM >60 12/22/2021    GFRAA >60 12/22/2021    GFR      12/22/2021    GFR NOT REPORTED 12/22/2021    PROT 7.6 12/21/2021    CALCIUM 8.7 12/22/2021     PT/INR:    Lab Results   Component Value Date    PROTIME 12.7 12/03/2021    INR 1.0 12/03/2021     PTT:   Lab Results   Component Value Date    APTT 24.6 08/02/2021     FLP:  No results found for: CHOL, TRIG, HDL  U/A:    Lab Results   Component Value Date    COLORU YELLOW 08/02/2021    TURBIDITY CLEAR 08/02/2021    SPECGRAV 1.032 08/02/2021    HGBUR NEGATIVE 08/02/2021    PHUR 6.0 08/02/2021    PROTEINU TRACE 08/02/2021    GLUCOSEU NEGATIVE 08/02/2021    KETUA NEGATIVE 08/02/2021    BILIRUBINUR NEGATIVE 08/02/2021    UROBILINOGEN ELEVATED 08/02/2021    NITRU NEGATIVE 08/02/2021    LEUKOCYTESUR NEGATIVE 08/02/2021     TSH:    Lab Results   Component Value Date    TSH 0.60 12/03/2021        Radiology:  XR CHEST (SINGLE VIEW FRONTAL)    Result Date: 12/22/2021  Negative low lung volume examination. MRI CERVICAL SPINE W WO CONTRAST    Result Date: 12/23/2021  1. No abnormal cord signal or enhancement. 2. Degenerative changes contribute to scattered neural foraminal narrowing as above. 3. No significant spinal canal stenosis. XR CHEST PORTABLE    Result Date: 12/21/2021  No acute cardiopulmonary disease suspected. MRI BRAIN W WO CONTRAST    Result Date: 12/23/2021  1. No acute intracranial abnormality. No acute infarct.  2. Scattered nonspecific T2 FLAIR hyperintense foci within the supratentorial white matter. No associated contrast enhancement or restricted diffusion. Consultations:    Consults:     Final Specialist Recommendations/Findings:   IP CONSULT TO HOSPITALIST  IP CONSULT TO NEUROLOGY      The patient was seen and examined on day of discharge and this discharge summary is in conjunction with any daily progress note from day of discharge. Discharge plan:     Disposition: Home    Physician Follow Up:   PCP within 1 week    Requiring Further Evaluation/Follow Up POST HOSPITALIZATION/Incidental Findings: Neurologist as scheduled    Diet: regular diet    Activity: As tolerated    Instructions to Patient: Outpatient follow-up at [facility] as recommended by neurology  Recheck blood sugars at home in 5 days, which were high here due to high dose of steroids, A1c was 5.5    Discharge Medications:       [unfilled]    No discharge procedures on file. Time Spent on discharge is  20 mins in patient examination, evaluation, counseling as well as medication reconciliation, prescriptions for required medications, discharge plan and follow up. Electronically signed by   Jennifer Mcdaniels MD  12/23/2021  4:58 PM      Thank you Dr. Vickie Zhu, QIAN - ANTONIO for the opportunity to be involved in this patient's care.

## 2021-12-23 NOTE — PLAN OF CARE
Problem: Pain:  Goal: Pain level will decrease  Description: Pain level will decrease  12/23/2021 0037 by Jhonatan Le RN  Outcome: Ongoing     Problem: Falls - Risk of:  Goal: Will remain free from falls  Description: Will remain free from falls  12/23/2021 0037 by Jhonatan Le RN  Outcome: Ongoing     Problem: Falls - Risk of:  Goal: Absence of physical injury  Description: Absence of physical injury  12/23/2021 0037 by Jhonatan Le RN  Outcome: Ongoing

## 2021-12-23 NOTE — PROGRESS NOTES
Active problem Multiple sclerosis . Fatigue with exhaustion . Symptoms appear more constitutional than MS exacerbation. Chronic daily headaches . The condition is MRI of Head with nonspecific subcortical T2 weighted white mater changes with no enhancement . MRI cervical spine with multi level disc bulging with facet and uncovertebral arthrosis with no canal stenosis . Chest xray normal  Hga1c 5.5 , S74 856 , folic acid 2.3 , ESR 7. She is feeling better having more energy on provigil 200 mg po bid . She is complaining of headache that she is attributing to solumedrol . She is alert and oriented with no focal motor ,sensory , bulbar or visual complaint. She is followed by NP working with Dr Thania Garcia on IV cladribine being considered to be changed to amivantamab IV which is yused in treatment for small cell lung cancer. 39 yo lady with fatigue and exhaustion . She has diagnosis of multiple sclerosis followed by neurologist Dr Thania Garcia on IV cladribine infusion twice per year also having migraines on aimovig SQ q month , topamax 100 mg po bid . She was at [facility] 3 weeks ago with her loosing her voice with dysphonia with disruptive headache given IV DHE and solumedrol easing headache complaints . She reports daily bifrontal headache currently grade 3 to 4 over 10 previously grade 8 over 10 during prior admission using ubrelvy as abortive  . She reports to have disruptive fatigue with exhaustion . This has markedly limited acitivity impending work . There is some generalized weakness complaint in relation to fatigue with no focal motor ,sensory , bulbar or visual complaints. She called Dr Rickie Burkitt office and was referred to hospital for MS exacerbation . She is on adderall 30 mg po qd for fatigue complaints that is now not effective . She received IV solumedrol 500 mg IVPB x 1 this admission the placed on 125 mg IVPB q 6 hours.   Significant medications  aimovig SQ q month , topamax 100 mg po bid ,zanaflex 4 mg po q 8 hours PRN , provigil 200 mg po bid , requip 2 mg po bid , solumedrol 500 mg IVPB q 12 hours x 3 days  . Testing MRI of Head with nonspecific subcortical T2 weighted white mater changes with no enhancement . MRI cervical spine with multi level disc bulging with facet and uncovertebral arthrosis with no canal stenosis . Chest xray normal  Hga1c 5.5 , C79 192 , folic caid 2.3 , ESR 7      Past Medical History:   Diagnosis Date    Balance problem     COVID-19 virus infection 02/2021    Dizziness     History of depression     Hypothyroidism     Migraines     Multiple sclerosis (HCC)     Restless leg syndrome     Sleep apnea     patient does not wear CPAP       Past Surgical History:   Procedure Laterality Date    BACK SURGERY  08/23/2021    T10-11 posterior lateral fusion    CARPAL TUNNEL RELEASE Left 10-    CARPAL TUNNEL RELEASE Right 10-    CHOLECYSTECTOMY      HYSTERECTOMY      LAMINECTOMY N/A 8/23/2021    T10- T11  POSTERIOR LATERAL    FUSION  & INSTRUMENTATION    WITH NEUROMONITORING   EVOKES    NUVASIVE  2 C-ARM performed by Mavis Mackay MD at [facility]  2001       Family History   Problem Relation Age of Onset    Heart Attack Father        Social History     Socioeconomic History    Marital status: Single     Spouse name: None    Number of children: None    Years of education: None    Highest education level: None   Occupational History    None   Tobacco Use    Smoking status: Never Smoker    Smokeless tobacco: Never Used   Vaping Use    Vaping Use: Never used   Substance and Sexual Activity    Alcohol use: No    Drug use: No    Sexual activity: None   Other Topics Concern    None   Social History Narrative    None     Social Determinants of Health     Financial Resource Strain:     Difficulty of Paying Living Expenses: Not on file   Food Insecurity:     Worried About Running Out of Food in the Last Year: Not on file  Ran Out of Food in the Last Year: Not on file   Transportation Needs:     Lack of Transportation (Medical): Not on file    Lack of Transportation (Non-Medical):  Not on file   Physical Activity:     Days of Exercise per Week: Not on file    Minutes of Exercise per Session: Not on file   Stress:     Feeling of Stress : Not on file   Social Connections:     Frequency of Communication with Friends and Family: Not on file    Frequency of Social Gatherings with Friends and Family: Not on file    Attends Hindu Services: Not on file    Active Member of Clubs or Organizations: Not on file    Attends Club or Organization Meetings: Not on file    Marital Status: Not on file   Intimate Partner Violence:     Fear of Current or Ex-Partner: Not on file    Emotionally Abused: Not on file    Physically Abused: Not on file    Sexually Abused: Not on file   Housing Stability:     Unable to Pay for Housing in the Last Year: Not on file    Number of [place] in the Last Year: Not on file    Unstable Housing in the Last Year: Not on file       Current Facility-Administered Medications   Medication Dose Route Frequency Provider Last Rate Last Admin    folic acid (FOLVITE) tablet 1 mg  1 mg Oral Daily Evangelina Soni MD   1 mg at 12/23/21 1122    oxyCODONE-acetaminophen (PERCOCET) 5-325 MG per tablet 1 tablet  1 tablet Oral Q4H PRN El Eng MD   1 tablet at 12/23/21 1117    insulin lispro (HUMALOG) injection vial 0-18 Units  0-18 Units SubCUTAneous TID WC El Eng MD   3 Units at 12/23/21 0925    insulin lispro (HUMALOG) injection vial 0-9 Units  0-9 Units SubCUTAneous Nightly Evangelina Soni MD   2 Units at 12/22/21 2054    modafinil (PROVIGIL) tablet 200 mg  200 mg Oral BID Nathan Sorto MD   200 mg at 12/23/21 0926    methylPREDNISolone sodium (SOLU-MEDROL) 500 mg in sodium chloride 0.9 % 250 mL IVPB  500 mg IntraVENous Q12H Nathan Sorto MD   Stopped at 12/23/21 polyethylene glycol (GLYCOLAX) packet 17 g  17 g Oral Daily PRN Boo Sharon, APRN - NP        acetaminophen (TYLENOL) tablet 650 mg  650 mg Oral Q6H PRN Boo Sharon, APRN - NP   650 mg at 12/22/21 0932    Or    acetaminophen (TYLENOL) suppository 650 mg  650 mg Rectal Q6H PRN Boo Sharon, APRN - NP        glucose (GLUTOSE) 40 % oral gel 15 g  15 g Oral PRN Evangelina Huizar MD        dextrose 50 % IV solution  12.5 g IntraVENous PRN Evangelina Huizar MD        glucagon (rDNA) injection 1 mg  1 mg IntraMUSCular PRN Evangelina Huizar MD        dextrose 5 % solution  100 mL/hr IntraVENous PRN Evangelina Huizar MD        HYDROmorphone (DILAUDID) injection 0.5 mg  0.5 mg IntraVENous Q4H PRN Evangelina Huizar MD   0.5 mg at 12/22/21 0932    Or    HYDROmorphone HCl PF (DILAUDID) injection 1 mg  1 mg IntraVENous Q4H PRN Lisa Goetz MD   1 mg at 12/22/21 5698       Allergies   Allergen Reactions    Pcn [Penicillins] Rash       ROS:   Constitutional                   Positive for fatigue    HEENT                            Negative for ear discharge, ear pain, nosebleed  Eyes                                Negative for photophobia, pain and discharge  Respiratory                      Negative for hemoptysis and sputum  Cardiovascular                Negative for orthopnea, claudication and PND  Gastrointestinal               Negative for abdominal pain, diarrhea, blood in stool  Musculoskeletal               Negative for joint pain, negative for myalgia  Skin                                 Negative for rash or itching  Endo/heme/allergies       Negative for polydipsia, environmental allergy  Psychiatric                       Negative for suicidal ideation.   Patient is not anxious    Vitals:    12/23/21 1200   BP: 109/65   Pulse: 76   Resp: 17   Temp: 97.3 °F (36.3 °C)   SpO2: 98%     Admission weight: 240 lb (108.9 kg)    Neurological Examination  Constitutional .General exam well groomed   Head/ Ears /Nose/Throat/external ear . Normal exam  Neck and thyroid . Normal size. No bruits  Respiratory . Breathsounds clear bilaterally  Cardiovascular: Auscultation of heart with regular rate and rhythm   Musculoskeletal. Muscle bulk and tone normal                                                           Muscle strength 5/5 strength throughout                                                                                No dysmetria or dysdiadokinesis  No tremor   Normal fine motor  Orientation Alert and oriented x 3   Attention and concentration normal  Short term memory normal  Language process and speech normal . No aphasia   Cranial nerve 2 normal acuety and visual fields  Cranial nerve 3, 4 and 6 . Extraocular muscles are intact . Pupils are equal and reactive   Cranial nerve 5 . Intact corneal reflex. Normal facial sensation  Cranial nerve 7 normal exam   Cranial nerve 8. Grossly intact hearing   Cranial nerve 9 and 10. Symmetric palate elevation   Cranial nerve 11 , 5 out of 5 strength   Cranial Nerve 12 midline tongue . No atrophy  Sensation . Normal pinprick and light touch   Deep Tendon Reflexes normal  Plantar response flexor bilaterally    Assessment :    Multiple sclerosis . Fatigue with exhaustion . Symptoms appear more constitutional than MS exacerbation.  Chronic daily headaches     Plan:    Okay to discharge on provigil 200 mg po bid q 8 AM and q 1 PM . OTP second opininion with [place] at [facility]

## 2021-12-27 ENCOUNTER — CARE COORDINATION (OUTPATIENT)
Dept: CASE MANAGEMENT | Age: 46
End: 2021-12-27

## 2021-12-27 LAB
ANTI JO-1 IGG: <0.4 U/ML
ANTI SSA: 18 U/ML
ANTI SSB: 0.6 U/ML
ANTI-CENTROMERE: <0.4 U/ML
ANTI-RNP70: <0.3 U/ML
ANTI-SCLERODERMA: 0.7 U/ML
ANTI-SMITH: 2.6 U/ML
ANTI-U1RNP: 1.4 U/ML

## 2021-12-27 NOTE — CARE COORDINATION
[facility] Transitions Initial Follow Up Call    Call within 2 business days of discharge: Yes    Patient: Annel Ramos Patient : 1975   MRN: [id number]  Reason for Admission: Multiple sclerosis exacerbation   Discharge Date: 21 RARS: Readmission Risk Score: 7.5 ( )      Last Discharge [facility]       Complaint Diagnosis Description Type Department Provider    21 Fatigue; Dizziness; Headache Multiple sclerosis exacerbation ([facility] [address] . .. ED to Hosp-Admission (Discharged) (ADMITTED) Jitendra Chen MD; Robina Mart. .. First attempt at initial 24 hour CTN call     Spoke with:  Called to speak with patient for initial  transition of care. Left HIPPA compliant voice message with contact information [phone] for a call  Back with an update. Facility: [facility]    Non-face-to-face services provided:  Obtained and reviewed discharge summary and/or continuity of care documents    Care Transitions 24 Hour Call    Care Transitions Interventions         Follow Up  No future appointments.     Jen Miller LPN

## 2021-12-28 ENCOUNTER — CARE COORDINATION (OUTPATIENT)
Dept: CASE MANAGEMENT | Age: 46
End: 2021-12-28

## 2021-12-28 NOTE — CARE COORDINATION
[facility] Transitions Initial Follow Up Call    Call within 2 business days of discharge: Yes    Patient: Sunni Srivastava Patient : 1975    MRN: [id number]  Reason for Admission: Multiple sclerosis exacerbation     Discharge Date: 21 RARS: Readmission Risk Score: 7.5 ( )      Last Discharge [facility]       Complaint Diagnosis Description Type Department Provider    21 Fatigue; Dizziness; Headache Multiple sclerosis exacerbation ([facility] [address] . .. ED to Hosp-Admission (Discharged) (ADMITTED) Parveen Coffman MD; Carlos Drake. .. Second attempt at initial 24 hour CTN call     Spoke with:  Called to speak with patient for initial  transition of care. Left HIPPA compliant voice message with contact information [phone] for a call  Back with an update. Facility: [facility]    Non-face-to-face services provided:  Obtained and reviewed discharge summary and/or continuity of care documents    Care Transitions 24 Hour Call    Care Transitions Interventions         Follow Up  No future appointments.     Charbel Biswas LPN

## 2022-05-08 ENCOUNTER — HOSPITAL ENCOUNTER (EMERGENCY)
Age: 47
Discharge: HOME | End: 2022-05-08
Attending: STUDENT IN AN ORGANIZED HEALTH CARE EDUCATION/TRAINING PROGRAM
Payer: COMMERCIAL

## 2022-05-08 ENCOUNTER — APPOINTMENT (OUTPATIENT)
Dept: CT IMAGING | Age: 47
End: 2022-05-08
Payer: COMMERCIAL

## 2022-05-08 ENCOUNTER — APPOINTMENT (OUTPATIENT)
Dept: GENERAL RADIOLOGY | Age: 47
End: 2022-05-08
Payer: COMMERCIAL

## 2022-05-08 VITALS
TEMPERATURE: 97.9 F | DIASTOLIC BLOOD PRESSURE: 97 MMHG | SYSTOLIC BLOOD PRESSURE: 142 MMHG | OXYGEN SATURATION: 96 % | BODY MASS INDEX: 36.37 KG/M2 | RESPIRATION RATE: 16 BRPM | WEIGHT: 240 LBS | HEIGHT: 68 IN | HEART RATE: 96 BPM

## 2022-05-08 DIAGNOSIS — H53.8 BLURRED VISION: Primary | ICD-10-CM

## 2022-05-08 DIAGNOSIS — R51.9 ACUTE NONINTRACTABLE HEADACHE, UNSPECIFIED HEADACHE TYPE: ICD-10-CM

## 2022-05-08 DIAGNOSIS — R06.02 SHORTNESS OF BREATH: ICD-10-CM

## 2022-05-08 LAB
ABSOLUTE EOS #: 0.29 K/UL (ref 0–0.44)
ABSOLUTE IMMATURE GRANULOCYTE: 0.03 K/UL (ref 0–0.3)
ABSOLUTE LYMPH #: 0.93 K/UL (ref 1.1–3.7)
ABSOLUTE MONO #: 0.5 K/UL (ref 0.1–1.2)
ANION GAP SERPL CALCULATED.3IONS-SCNC: 13 MMOL/L (ref 9–17)
BASOPHILS # BLD: 1 % (ref 0–2)
BASOPHILS ABSOLUTE: 0.03 K/UL (ref 0–0.2)
BUN BLDV-MCNC: 15 MG/DL (ref 6–20)
BUN/CREAT BLD: 21 (ref 9–20)
CALCIUM SERPL-MCNC: 8.7 MG/DL (ref 8.6–10.4)
CHLORIDE BLD-SCNC: 104 MMOL/L (ref 98–107)
CO2: 22 MMOL/L (ref 20–31)
CREAT SERPL-MCNC: 0.73 MG/DL (ref 0.5–0.9)
EOSINOPHILS RELATIVE PERCENT: 6 % (ref 1–4)
GFR AFRICAN AMERICAN: >60 ML/MIN
GFR NON-AFRICAN AMERICAN: >60 ML/MIN
GFR SERPL CREATININE-BSD FRML MDRD: ABNORMAL ML/MIN/{1.73_M2}
GLUCOSE BLD-MCNC: 118 MG/DL (ref 70–99)
HCT VFR BLD CALC: 45.5 % (ref 36.3–47.1)
HEMOGLOBIN: 15.3 G/DL (ref 11.9–15.1)
IMMATURE GRANULOCYTES: 1 %
LYMPHOCYTES # BLD: 18 % (ref 24–43)
MCH RBC QN AUTO: 31.2 PG (ref 25.2–33.5)
MCHC RBC AUTO-ENTMCNC: 33.6 G/DL (ref 28.4–34.8)
MCV RBC AUTO: 92.9 FL (ref 82.6–102.9)
MONOCYTES # BLD: 10 % (ref 3–12)
MYOGLOBIN: 31 NG/ML (ref 25–58)
MYOGLOBIN: 32 NG/ML (ref 25–58)
NRBC AUTOMATED: 0 PER 100 WBC
PDW BLD-RTO: 12.6 % (ref 11.8–14.4)
PLATELET # BLD: 269 K/UL (ref 138–453)
PMV BLD AUTO: 9.4 FL (ref 8.1–13.5)
POTASSIUM SERPL-SCNC: 3.7 MMOL/L (ref 3.7–5.3)
RBC # BLD: 4.9 M/UL (ref 3.95–5.11)
SEG NEUTROPHILS: 64 % (ref 36–65)
SEGMENTED NEUTROPHILS ABSOLUTE COUNT: 3.46 K/UL (ref 1.5–8.1)
SODIUM BLD-SCNC: 139 MMOL/L (ref 135–144)
TROPONIN, HIGH SENSITIVITY: <6 NG/L (ref 0–14)
TROPONIN, HIGH SENSITIVITY: <6 NG/L (ref 0–14)
WBC # BLD: 5.2 K/UL (ref 3.5–11.3)

## 2022-05-08 PROCEDURE — 99285 EMERGENCY DEPT VISIT HI MDM: CPT

## 2022-05-08 PROCEDURE — 96375 TX/PRO/DX INJ NEW DRUG ADDON: CPT

## 2022-05-08 PROCEDURE — 70498 CT ANGIOGRAPHY NECK: CPT

## 2022-05-08 PROCEDURE — 83874 ASSAY OF MYOGLOBIN: CPT

## 2022-05-08 PROCEDURE — 85025 COMPLETE CBC W/AUTO DIFF WBC: CPT

## 2022-05-08 PROCEDURE — 70450 CT HEAD/BRAIN W/O DYE: CPT

## 2022-05-08 PROCEDURE — 96374 THER/PROPH/DIAG INJ IV PUSH: CPT

## 2022-05-08 PROCEDURE — 84484 ASSAY OF TROPONIN QUANT: CPT

## 2022-05-08 PROCEDURE — 6360000004 HC RX CONTRAST MEDICATION: Performed by: PHYSICIAN ASSISTANT

## 2022-05-08 PROCEDURE — 6360000002 HC RX W HCPCS: Performed by: PHYSICIAN ASSISTANT

## 2022-05-08 PROCEDURE — 93005 ELECTROCARDIOGRAM TRACING: CPT | Performed by: PHYSICIAN ASSISTANT

## 2022-05-08 PROCEDURE — 71045 X-RAY EXAM CHEST 1 VIEW: CPT

## 2022-05-08 PROCEDURE — 80048 BASIC METABOLIC PNL TOTAL CA: CPT

## 2022-05-08 PROCEDURE — 2580000003 HC RX 258: Performed by: PHYSICIAN ASSISTANT

## 2022-05-08 RX ORDER — DEXAMETHASONE SODIUM PHOSPHATE 10 MG/ML
10 INJECTION, SOLUTION INTRAMUSCULAR; INTRAVENOUS ONCE
Status: COMPLETED | OUTPATIENT
Start: 2022-05-08 | End: 2022-05-08

## 2022-05-08 RX ORDER — 0.9 % SODIUM CHLORIDE 0.9 %
80 INTRAVENOUS SOLUTION INTRAVENOUS ONCE
Status: COMPLETED | OUTPATIENT
Start: 2022-05-08 | End: 2022-05-08

## 2022-05-08 RX ORDER — DIPHENHYDRAMINE HYDROCHLORIDE 50 MG/ML
25 INJECTION INTRAMUSCULAR; INTRAVENOUS ONCE
Status: COMPLETED | OUTPATIENT
Start: 2022-05-08 | End: 2022-05-08

## 2022-05-08 RX ORDER — KETOROLAC TROMETHAMINE 30 MG/ML
30 INJECTION, SOLUTION INTRAMUSCULAR; INTRAVENOUS ONCE
Status: COMPLETED | OUTPATIENT
Start: 2022-05-08 | End: 2022-05-08

## 2022-05-08 RX ORDER — SODIUM CHLORIDE 0.9 % (FLUSH) 0.9 %
10 SYRINGE (ML) INJECTION PRN
Status: DISCONTINUED | OUTPATIENT
Start: 2022-05-08 | End: 2022-05-09 | Stop reason: HOSPADM

## 2022-05-08 RX ORDER — METOCLOPRAMIDE HYDROCHLORIDE 5 MG/ML
10 INJECTION INTRAMUSCULAR; INTRAVENOUS ONCE
Status: COMPLETED | OUTPATIENT
Start: 2022-05-08 | End: 2022-05-08

## 2022-05-08 RX ADMIN — DIPHENHYDRAMINE HYDROCHLORIDE 25 MG: 50 INJECTION, SOLUTION INTRAMUSCULAR; INTRAVENOUS at 22:31

## 2022-05-08 RX ADMIN — SODIUM CHLORIDE 80 ML: 9 INJECTION, SOLUTION INTRAVENOUS at 22:08

## 2022-05-08 RX ADMIN — KETOROLAC TROMETHAMINE 30 MG: 30 INJECTION, SOLUTION INTRAMUSCULAR at 22:31

## 2022-05-08 RX ADMIN — METOCLOPRAMIDE 10 MG: 5 INJECTION, SOLUTION INTRAMUSCULAR; INTRAVENOUS at 22:31

## 2022-05-08 RX ADMIN — IOPAMIDOL 75 ML: 755 INJECTION, SOLUTION INTRAVENOUS at 22:08

## 2022-05-08 RX ADMIN — DEXAMETHASONE SODIUM PHOSPHATE 10 MG: 10 INJECTION, SOLUTION INTRAMUSCULAR; INTRAVENOUS at 22:31

## 2022-05-08 RX ADMIN — SODIUM CHLORIDE, PRESERVATIVE FREE 10 ML: 5 INJECTION INTRAVENOUS at 22:08

## 2022-05-08 ASSESSMENT — VISUAL ACUITY
OD: 20/30
OU: 20/15
OS: 20/20

## 2022-05-08 ASSESSMENT — PAIN - FUNCTIONAL ASSESSMENT: PAIN_FUNCTIONAL_ASSESSMENT: NONE - DENIES PAIN

## 2022-05-09 LAB
EKG ATRIAL RATE: 80 BPM
EKG P AXIS: 49 DEGREES
EKG P-R INTERVAL: 144 MS
EKG Q-T INTERVAL: 398 MS
EKG QRS DURATION: 100 MS
EKG QTC CALCULATION (BAZETT): 459 MS
EKG R AXIS: 37 DEGREES
EKG T AXIS: 56 DEGREES
EKG VENTRICULAR RATE: 80 BPM

## 2022-05-09 PROCEDURE — 93010 ELECTROCARDIOGRAM REPORT: CPT | Performed by: INTERNAL MEDICINE

## 2022-05-09 NOTE — ED PROVIDER NOTES
EMERGENCY DEPARTMENT ENCOUNTER   ATTENDING ATTESTATION     Pt Name: Sirisha Sifuentes  MRN: [id number]  [place] 1975  Date of evaluation: 5/8/22   Sirisha Sifuentes is a 40 y.o. female with CC: Hypertension and Diplopia (notice after starting new script today)    MDM:   78-year-old female history of MS recently started new immunotherapy. Follows with Dr. Cheo Chilel. States she had some blurry vision which is now resolved. Some hypertension which is improved currently and having intermittent chest pain which is primary reason for visit. Neuro exam nonfocal per APC. Obtain cardiac rule out. CT CTA head neck. If all negative and no neurodeficits anticipate outpatient follow-up. Patient reassessed at bedside. Headache has resolved with medication. CT imaging negative. 2 troponins negative. Low suspicion for ACS. Follow-up with neurology regarding new medication. Rhythm: normal sinus   Rate: normal  Axis: normal  Conduction: normal  ST Segments: no acute change  T Waves: no acute change  Q Waves: no acute change    Clinical Impression: no acute change, but this is a nonspecific EKG. CRITICAL CARE:   0    EKG: All EKG's are interpreted by the Emergency Department Physician who either signs or Co-signs this chart in the absence of a cardiologist.      RADIOLOGY:All plain film, CT, MRI, and formal ultrasound images (except ED bedside ultrasound) are read by the radiologist, see reports below, unless otherwise noted in MDM or here. XR CHEST PORTABLE   Final Result   Marginal inspiration, without evidence of acute cardiopulmonary disease         CT HEAD WO CONTRAST    (Results Pending)   CTA HEAD NECK W CONTRAST    (Results Pending)     LABS: All lab results were reviewed by myself, and all abnormals are listed below.   Labs Reviewed   CBC WITH AUTO DIFFERENTIAL - Abnormal; Notable for the following components:       Result Value    Hemoglobin 15.3 (*)     Lymphocytes 18 (*)     Eosinophils % 6 (*) Immature Granulocytes 1 (*)     Absolute Lymph # 0.93 (*)     All other components within normal limits   BASIC METABOLIC PANEL - Abnormal; Notable for the following components:    Glucose 118 (*)     Bun/Cre Ratio 21 (*)     All other components within normal limits   TROP/MYOGLOBIN   TROP/MYOGLOBIN   TROP/MYOGLOBIN     CONSULTS:  None  FINAL IMPRESSION    No diagnosis found.         PASTMEDICAL HISTORY     Past Medical History:   Diagnosis Date    Balance problem     COVID-19 virus infection 02/2021    Dizziness     History of depression     Hypothyroidism     Migraines     Multiple sclerosis (HCC)     Restless leg syndrome     Sleep apnea     patient does not wear CPAP     SURGICAL HISTORY       Past Surgical History:   Procedure Laterality Date    BACK SURGERY  08/23/2021    T10-11 posterior lateral fusion    CARPAL TUNNEL RELEASE Left 10-    CARPAL TUNNEL RELEASE Right 10-    CHOLECYSTECTOMY      HYSTERECTOMY      LAMINECTOMY N/A 8/23/2021    T10- T11  POSTERIOR LATERAL    FUSION  & INSTRUMENTATION    WITH NEUROMONITORING   EVOKES    NUVASIVE  2 C-ARM performed by Lino Arzate MD at [address]     CURRENT MEDICATIONS       Previous Medications    ERENUMAB-AOOE (AIMOVIG, 140 MG DOSE,) 70 MG/ML SOAJ    Inject 140 mg into the skin every 30 days    FOLIC ACID (FOLVITE) 1 MG TABLET    Take 1 tablet by mouth daily    LEVOTHYROXINE (SYNTHROID) 25 MCG TABLET    Take 25 mcg by mouth Daily     LIRAGLUTIDE-WEIGHT MANAGEMENT (SAXENDA) 18 MG/3ML SOPN    Inject 3 mg into the skin daily     ONDANSETRON (ZOFRAN ODT) 4 MG DISINTEGRATING TABLET    Take 4 mg by mouth daily as needed for Nausea or Vomiting    ROPINIROLE (REQUIP) 2 MG TABLET    Take 2 mg by mouth 2 times daily    TIZANIDINE (ZANAFLEX) 4 MG TABLET    Take 1 tablet by mouth every 8 hours as needed (spasm)    TOPIRAMATE (TOPAMAX) 25 MG TABLET    Take 25 mg by mouth 2 times daily UBROGEPANT (UBRELVY) 100 MG TABS    Take 100 mg by mouth as needed (migraine)    VENLAFAXINE (EFFEXOR) 37.5 MG TABLET    Take 37.5 mg by mouth 2 times daily    VITAMIN B-12 (CYANOCOBALAMIN) 100 MCG TABLET    Take 100 mcg by mouth daily      ALLERGIES     is allergic to pcn [penicillins]. FAMILY HISTORY     She indicated that her father is . SOCIAL HISTORY       Social History     Tobacco Use    Smoking status: Never Smoker    Smokeless tobacco: Never Used   Vaping Use    Vaping Use: Never used   Substance Use Topics    Alcohol use: No    Drug use: No       This visit was performed by both a physician and an APC. I personally evaluated and examined the patient. I performed all aspects of the MDM as documented  I personally evaluated and examined the patient in conjunction with the APC and agree with the assessment, treatment plan, and disposition of the patient as recorded by the APC.      Hoang Pryor DO  Attending Emergency Physician       Hoang Pryor DO  22 [phone]

## 2022-05-09 NOTE — ED PROVIDER NOTES
[address] [place] [address] ED  [facility]      Pt Name: Lizeth Lowery  MRN: [id number]  [place] 1975  Date ofevaluation: 5/8/2022  Provider: Rimma Hutton PA-C    CHIEF COMPLAINT       Chief Complaint   Patient presents with    Hypertension    Diplopia     notice after starting new script today         HISTORY OF PRESENT ILLNESS  (Location/Symptom, Timing/Onset, Context/Setting, Quality, Duration, Modifying Factors, Severity.)   Lizeth Lowery is a 40 y.o. female who presents to the emergency department with seeing double. Patient started any medication today for multiple sclerosis. Couple hours after ingestion patient symptoms started. Also had headache and chest pain. No fever chills. No vomiting. Diplopia has resolved. Chest discomfort and headache has persisted. No definitely very aggravating factors. No other complaints. Nursing Notes were reviewed.     ALLERGIES     Pcn [penicillins]    CURRENT MEDICATIONS       Discharge Medication List as of 5/8/2022 11:40 PM      CONTINUE these medications which have NOT CHANGED    Details   folic acid (FOLVITE) 1 MG tablet Take 1 tablet by mouth daily, Disp-30 tablet, R-3Normal      Erenumab-aooe (AIMOVIG, 140 MG DOSE,) 70 MG/ML SOAJ Inject 140 mg into the skin every 30 daysHistorical Med      Ubrogepant (UBRELVY) 100 MG TABS Take 100 mg by mouth as needed (migraine)Historical Med      ondansetron (ZOFRAN ODT) 4 MG disintegrating tablet Take 4 mg by mouth daily as needed for Nausea or VomitingHistorical Med      liraglutide-weight management (SAXENDA) 18 MG/3ML SOPN Inject 3 mg into the skin daily Historical Med      tiZANidine (ZANAFLEX) 4 MG tablet Take 1 tablet by mouth every 8 hours as needed (spasm), Disp-50 tablet, R-0Print      venlafaxine (EFFEXOR) 37.5 MG tablet Take 37.5 mg by mouth 2 times dailyHistorical Med      topiramate (TOPAMAX) 25 MG tablet Take 25 mg by mouth 2 times daily Historical Med      rOPINIRole (REQUIP) 2 MG tablet Take 2 mg by mouth 2 times dailyHistorical Med      levothyroxine (SYNTHROID) 25 MCG tablet Take 25 mcg by mouth Daily Historical Med      vitamin B-12 (CYANOCOBALAMIN) 100 MCG tablet Take 100 mcg by mouth daily Historical Med             PAST MEDICAL HISTORY         Diagnosis Date    Balance problem     COVID-19 virus infection 2021    Dizziness     History of depression     Hypothyroidism     Migraines     Multiple sclerosis (HCC)     Restless leg syndrome     Sleep apnea     patient does not wear CPAP       SURGICAL HISTORY           Procedure Laterality Date    BACK SURGERY  2021    T10-11 posterior lateral fusion    CARPAL TUNNEL RELEASE Left 10-    CARPAL TUNNEL RELEASE Right 10-    CHOLECYSTECTOMY      HYSTERECTOMY      LAMINECTOMY N/A 2021    T10- T11  POSTERIOR LATERAL    FUSION  & INSTRUMENTATION    WITH NEUROMONITORING   EVOKES    NUVASIVE  2 C-ARM performed by Cate Tai MD at [address]         FAMILY HISTORY           Problem Relation Age of Onset    Heart Attack Father      Family Status   Relation Name Status    Father          SOCIAL HISTORY      reports that she has never smoked. She has never used smokeless tobacco. She reports that she does not drink alcohol and does not use drugs. REVIEW OFSYSTEMS    (2-9 systems for level 4, 10 or more for level 5)   Review of Systems    Except as noted above the remainder of the review of systems was reviewed and negative. PHYSICAL EXAM    (up to 7 for level 4, 8 or more for level 5)     ED Triage Vitals [22]   BP Temp Temp Source Pulse Resp SpO2 Height Weight   (!) 142/97 97.9 °F (36.6 °C) Oral 96 16 96 % 5' 8\" (1.727 m) 240 lb (108.9 kg)      Physical Exam  Constitutional:       Appearance: She is well-developed. HENT:      Head: Normocephalic and atraumatic.    Cardiovascular:      Rate and Rhythm: Normal rate and regular rhythm. Pulmonary:      Effort: Pulmonary effort is normal.      Breath sounds: Normal breath sounds. Abdominal:      Palpations: Abdomen is soft. Musculoskeletal:         General: Normal range of motion. Cervical back: Normal range of motion and neck supple. Skin:     General: Skin is warm. Findings: No rash. Neurological:      Mental Status: She is alert and oriented to person, place, and time. GCS: GCS eye subscore is 4. GCS verbal subscore is 5. GCS motor subscore is 6. Cranial Nerves: Cranial nerves are intact. Sensory: Sensation is intact. Motor: Motor function is intact. Coordination: Coordination is intact. Psychiatric:         Behavior: Behavior normal.                 DIAGNOSTIC RESULTS     EKG: All EKG's are interpreted by the Emergency Department Physician who either signs or Co-signs this chart in the absence of a cardiologist.        RADIOLOGY:   Non-plain film images such as CT, Ultrasound and MRI are read by the radiologist. Plain radiographic images arevisualized and preliminarily interpreted by the emergency physician with the below findings:        Interpretation per the Radiologist below, if available at thetime of this note:          ED BEDSIDE ULTRASOUND:   Performed by ED Physician - none    LABS:  Labs Reviewed   CBC WITH AUTO DIFFERENTIAL - Abnormal; Notable for the following components:       Result Value    Hemoglobin 15.3 (*)     Lymphocytes 18 (*)     Eosinophils % 6 (*)     Immature Granulocytes 1 (*)     Absolute Lymph # 0.93 (*)     All other components within normal limits   BASIC METABOLIC PANEL - Abnormal; Notable for the following components:    Glucose 118 (*)     Bun/Cre Ratio 21 (*)     All other components within normal limits   TROP/MYOGLOBIN   TROP/MYOGLOBIN       All other labs were within normal range or not returned as of this dictation.     EMERGENCY DEPARTMENT COURSE and DIFFERENTIAL DIAGNOSIS/MDM:   Vitals: Vitals:    05/08/22 2052   BP: (!) 142/97   Pulse: 96   Resp: 16   Temp: 97.9 °F (36.6 °C)   TempSrc: Oral   SpO2: 96%   Weight: 240 lb (108.9 kg)   Height: 5' 8\" (1.727 m)     Patient signed out to dr Eve Harvey. CONSULTS:  None    PROCEDURES:  Procedures        FINAL IMPRESSION      1. Blurred vision    2. Shortness of breath    3. Acute nonintractable headache, unspecified headache type          DISPOSITION/PLAN   DISPOSITION        PATIENTREFERRED TO:   No follow-up provider specified.     DISCHARGE MEDICATIONS:     Discharge Medication List as of 5/8/2022 11:40 PM              (Please note that portions of this note were completed with a voice recognition program.  Efforts were made to edit thedictations but occasionally words are mis-transcribed.)    JAYNE Berrios PA-C  05/08/22 2000 [facility] Giovanni Roldan PA-C  05/12/22 [facility]

## 2022-06-20 ENCOUNTER — HOSPITAL ENCOUNTER (EMERGENCY)
Age: 47
Discharge: HOME | End: 2022-06-20
Attending: EMERGENCY MEDICINE
Payer: COMMERCIAL

## 2022-06-20 ENCOUNTER — APPOINTMENT (OUTPATIENT)
Dept: CT IMAGING | Age: 47
End: 2022-06-20
Payer: COMMERCIAL

## 2022-06-20 VITALS
OXYGEN SATURATION: 96 % | HEART RATE: 95 BPM | TEMPERATURE: 97.4 F | WEIGHT: 240 LBS | SYSTOLIC BLOOD PRESSURE: 136 MMHG | DIASTOLIC BLOOD PRESSURE: 99 MMHG | RESPIRATION RATE: 15 BRPM | BODY MASS INDEX: 36.37 KG/M2 | HEIGHT: 68 IN

## 2022-06-20 DIAGNOSIS — G35 MULTIPLE SCLEROSIS EXACERBATION (HCC): Primary | ICD-10-CM

## 2022-06-20 LAB
-: ABNORMAL
ABSOLUTE EOS #: 0.38 K/UL (ref 0–0.4)
ABSOLUTE IMMATURE GRANULOCYTE: 0.04 K/UL (ref 0–0.3)
ABSOLUTE LYMPH #: 0.38 K/UL (ref 1–4.8)
ABSOLUTE MONO #: 0.38 K/UL (ref 0.2–0.8)
AMORPHOUS: ABNORMAL
ANION GAP SERPL CALCULATED.3IONS-SCNC: 10 MMOL/L (ref 9–17)
BASOPHILS # BLD: 1 %
BASOPHILS ABSOLUTE: 0.04 K/UL (ref 0–0.2)
BILIRUBIN URINE: NEGATIVE
BUN BLDV-MCNC: 13 MG/DL (ref 6–20)
BUN/CREAT BLD: 20 (ref 9–20)
CALCIUM SERPL-MCNC: 9.3 MG/DL (ref 8.6–10.4)
CHLORIDE BLD-SCNC: 106 MMOL/L (ref 98–107)
CO2: 23 MMOL/L (ref 20–31)
COLOR: YELLOW
CREAT SERPL-MCNC: 0.66 MG/DL (ref 0.5–0.9)
EOSINOPHILS RELATIVE PERCENT: 10 % (ref 1–4)
EPITHELIAL CELLS UA: ABNORMAL /HPF (ref 0–5)
GFR AFRICAN AMERICAN: >60 ML/MIN
GFR NON-AFRICAN AMERICAN: >60 ML/MIN
GFR SERPL CREATININE-BSD FRML MDRD: ABNORMAL ML/MIN/{1.73_M2}
GLUCOSE BLD-MCNC: 100 MG/DL (ref 70–99)
GLUCOSE URINE: NEGATIVE
HCT VFR BLD CALC: 46.3 % (ref 36.3–47.1)
HEMOGLOBIN: 16.2 G/DL (ref 11.9–15.1)
IMMATURE GRANULOCYTES: 1 %
KETONES, URINE: NEGATIVE
LEUKOCYTE ESTERASE, URINE: NEGATIVE
LYMPHOCYTES # BLD: 10 % (ref 24–44)
MAGNESIUM: 2.1 MG/DL (ref 1.6–2.6)
MCH RBC QN AUTO: 32.1 PG (ref 25.2–33.5)
MCHC RBC AUTO-ENTMCNC: 35 G/DL (ref 28.4–34.8)
MCV RBC AUTO: 91.7 FL (ref 82.6–102.9)
MONOCYTES # BLD: 10 % (ref 1–7)
NITRITE, URINE: NEGATIVE
NRBC AUTOMATED: 0 PER 100 WBC
PDW BLD-RTO: 14.1 % (ref 11.8–14.4)
PH UA: 6 (ref 5–8)
PLATELET # BLD: 263 K/UL (ref 138–453)
PMV BLD AUTO: 9.2 FL (ref 8.1–13.5)
POTASSIUM SERPL-SCNC: 3.7 MMOL/L (ref 3.7–5.3)
PROTEIN UA: NEGATIVE
RBC # BLD: 5.05 M/UL (ref 3.95–5.11)
RBC UA: ABNORMAL /HPF (ref 0–2)
SEG NEUTROPHILS: 68 % (ref 36–66)
SEGMENTED NEUTROPHILS ABSOLUTE COUNT: 2.58 K/UL (ref 1.8–7.7)
SODIUM BLD-SCNC: 139 MMOL/L (ref 135–144)
SPECIFIC GRAVITY UA: 1.02 (ref 1–1.03)
TURBIDITY: ABNORMAL
URINE HGB: NEGATIVE
UROBILINOGEN, URINE: NORMAL
WBC # BLD: 3.8 K/UL (ref 3.5–11.3)
WBC UA: ABNORMAL /HPF (ref 0–5)

## 2022-06-20 PROCEDURE — 83735 ASSAY OF MAGNESIUM: CPT

## 2022-06-20 PROCEDURE — 6360000002 HC RX W HCPCS: Performed by: NURSE PRACTITIONER

## 2022-06-20 PROCEDURE — 80048 BASIC METABOLIC PNL TOTAL CA: CPT

## 2022-06-20 PROCEDURE — 81001 URINALYSIS AUTO W/SCOPE: CPT

## 2022-06-20 PROCEDURE — 85025 COMPLETE CBC W/AUTO DIFF WBC: CPT

## 2022-06-20 PROCEDURE — 2580000003 HC RX 258: Performed by: NURSE PRACTITIONER

## 2022-06-20 PROCEDURE — 96365 THER/PROPH/DIAG IV INF INIT: CPT

## 2022-06-20 PROCEDURE — 70450 CT HEAD/BRAIN W/O DYE: CPT

## 2022-06-20 PROCEDURE — 99284 EMERGENCY DEPT VISIT MOD MDM: CPT

## 2022-06-20 RX ORDER — PREDNISONE 10 MG/1
TABLET ORAL
Qty: 30 TABLET | Refills: 0 | Status: SHIPPED | OUTPATIENT
Start: 2022-06-20

## 2022-06-20 RX ADMIN — SODIUM CHLORIDE 500 MG: 9 INJECTION, SOLUTION INTRAVENOUS at 14:00

## 2022-06-20 ASSESSMENT — ENCOUNTER SYMPTOMS
ABDOMINAL PAIN: 0
BACK PAIN: 0
DIARRHEA: 0
COUGH: 0
VOMITING: 0
NAUSEA: 0
COLOR CHANGE: 0
SHORTNESS OF BREATH: 0
SORE THROAT: 0
PHOTOPHOBIA: 0
EYE PAIN: 0

## 2022-06-20 ASSESSMENT — PAIN - FUNCTIONAL ASSESSMENT: PAIN_FUNCTIONAL_ASSESSMENT: 0-10

## 2022-06-20 ASSESSMENT — PAIN SCALES - GENERAL: PAINLEVEL_OUTOF10: 7

## 2022-06-20 NOTE — ED NOTES
Pt to er with c/o headache. Pt states she has hx of MS and feels like she is having a flare up. Pt states she sees Dr. Addy mcakey for ms. Pt denies recent fever or chills. Pt a&ox3. Skin warm and dry. Respirations even and non-labored.       China Rodriguez RN  06/20/22 5223

## 2022-06-20 NOTE — ED PROVIDER NOTES
eMERGENCY dEPARTMENT eNCOUnter   [address] [place] Name: Zi Zhou  MRN: [id number]  [place] 1975  Date of evaluation: 6/20/22     Zi Zhou is a 56 y.o. female with CC: Headache (Pt believes she's having an MS flare up; c/o head pressure and balance issues for several weeks), Generalized Body Aches, and Other        This visit was performed by both a physician and an APC. I performed all aspects of the MDM as documented.       The care is provided during an unprecedented national emergency due to the novel coronavirus, Feliciano Bauman MD  Attending Emergency Physician           Samson Jason MD  06/20/22 6171

## 2022-06-20 NOTE — ED PROVIDER NOTES
Team [address] ED  eMERGENCY dEPARTMENT eNCOUnter      Pt Name: Yomaira Nevarez  MRN: [id number]  [place] 1975  Date of evaluation: 6/20/2022  Provider: QIAN Jose CNP    CHIEF COMPLAINT       Chief Complaint   Patient presents with    Headache     Pt believes she's having an MS flare up; c/o head pressure and balance issues for several weeks    Generalized Body Aches    Other         HISTORY OF PRESENT ILLNESS  (Location/Symptom, Timing/Onset, Context/Setting, Quality, Duration, Modifying Factors, Severity.)   Yomaira Nevarez is a 56 y.o. female who presents to the emergency department via private auto for fatigue, head pressure, HA, issues with her balance. Onset was 2 weeks ago. She believes it is related to her MS. Denies fever, chills, injury, vision changes. Denies chest pain, SOB, N/V/D. Rates her pain 7/10 at this time. She has chronic headache. The headache she is having today is typical for her. Nursing Notes were reviewed.     ALLERGIES     Pcn [penicillins]    CURRENT MEDICATIONS       Previous Medications    ERENUMAB-AOOE (AIMOVIG, 140 MG DOSE,) 70 MG/ML SOAJ    Inject 140 mg into the skin every 30 days    FOLIC ACID (FOLVITE) 1 MG TABLET    Take 1 tablet by mouth daily    LEVOTHYROXINE (SYNTHROID) 25 MCG TABLET    Take 25 mcg by mouth Daily     LIRAGLUTIDE-WEIGHT MANAGEMENT (SAXENDA) 18 MG/3ML SOPN    Inject 3 mg into the skin daily     ONDANSETRON (ZOFRAN ODT) 4 MG DISINTEGRATING TABLET    Take 4 mg by mouth daily as needed for Nausea or Vomiting    ROPINIROLE (REQUIP) 2 MG TABLET    Take 2 mg by mouth 2 times daily    TIZANIDINE (ZANAFLEX) 4 MG TABLET    Take 1 tablet by mouth every 8 hours as needed (spasm)    TOPIRAMATE (TOPAMAX) 25 MG TABLET    Take 25 mg by mouth 2 times daily     UBROGEPANT (UBRELVY) 100 MG TABS    Take 100 mg by mouth as needed (migraine)    VENLAFAXINE (EFFEXOR) 37.5 MG TABLET    Take 37.5 mg by mouth 2 times daily    VITAMIN B-12 (CYANOCOBALAMIN) 100 MCG TABLET    Take 100 mcg by mouth daily        PAST MEDICAL HISTORY         Diagnosis Date    Balance problem     COVID-19 virus infection 2021    Dizziness     History of depression     Hypothyroidism     Migraines     Multiple sclerosis (HCC)     Restless leg syndrome     Sleep apnea     patient does not wear CPAP       SURGICAL HISTORY           Procedure Laterality Date    BACK SURGERY  2021    T10-11 posterior lateral fusion    CARPAL TUNNEL RELEASE Left 10-    CARPAL TUNNEL RELEASE Right 10-    CHOLECYSTECTOMY      HYSTERECTOMY (CERVIX STATUS UNKNOWN)      LAMINECTOMY N/A 2021    T10- T11  POSTERIOR LATERAL    FUSION  & INSTRUMENTATION    WITH NEUROMONITORING   EVOKES    NUVASIVE  2 C-ARM performed by Gamaliel Keating MD at [address] [facility]           FAMILY HISTORY           Problem Relation Age of Onset    Heart Attack Father      Family Status   Relation Name Status    Father          SOCIAL HISTORY      reports that she has never smoked. She has never used smokeless tobacco. She reports that she does not drink alcohol and does not use drugs. REVIEW OF SYSTEMS    (2-9 systems for level 4, 10 or more for level 5)     Review of Systems   Constitutional: Negative for chills, diaphoresis, fatigue and fever. HENT: Negative for congestion and sore throat. Eyes: Negative for photophobia, pain and visual disturbance. Respiratory: Negative for cough and shortness of breath. Cardiovascular: Negative for chest pain. Gastrointestinal: Negative for abdominal pain, diarrhea, nausea and vomiting. Genitourinary: Negative for dysuria. Musculoskeletal: Positive for gait problem. Negative for arthralgias, back pain, myalgias and neck pain. Skin: Negative for color change and wound. Neurological: Positive for dizziness and headaches.  Negative for facial asymmetry, speech difficulty, weakness and numbness. Psychiatric/Behavioral: Negative for confusion. Except as noted above the remainder of the review of systems was reviewed and negative. PHYSICAL EXAM    (up to 7 for level 4, 8 or more for level 5)     ED Triage Vitals [06/20/22 1126]   BP Temp Temp src Heart Rate Resp SpO2 Height Weight   (!) 136/99 97.4 °F (36.3 °C) -- 95 15 96 % 5' 8\" (1.727 m) 240 lb (108.9 kg)     Physical Exam  Vitals reviewed. Constitutional:       General: She is not in acute distress. Appearance: She is well-developed. She is not diaphoretic. HENT:      Right Ear: External ear normal.      Left Ear: External ear normal.   Eyes:      General: No scleral icterus. Extraocular Movements: Extraocular movements intact. Conjunctiva/sclera: Conjunctivae normal.      Pupils: Pupils are equal, round, and reactive to light. Cardiovascular:      Rate and Rhythm: Normal rate. Pulmonary:      Effort: Pulmonary effort is normal. No respiratory distress. Breath sounds: No stridor. Musculoskeletal:      Cervical back: Neck supple. Comments: Moves extremities. Skin:     General: Skin is warm and dry. Findings: No rash. Neurological:      Mental Status: She is alert and oriented to person, place, and time. GCS: GCS eye subscore is 4. GCS verbal subscore is 5. GCS motor subscore is 6. Cranial Nerves: Cranial nerves are intact. Motor: Motor function is intact. Coordination: Coordination is intact. Gait: Gait is intact. Comments: Moves extremities.     Psychiatric:         Behavior: Behavior normal.         DIAGNOSTIC RESULTS     RADIOLOGY:   Non-plain film images such as CT, Ultrasound and MRI are read by the radiologist. Plain radiographic images are visualized and preliminarily interpreted by the emergency physician with the below findings:    Interpretation per the Radiologist below, if available at the time of this note:    CT HEAD WO CONTRAST    Result Date: 6/20/2022  EXAMINATION: CT OF THE HEAD WITHOUT CONTRAST  6/20/2022 11:47 am TECHNIQUE: CT of the head was performed without the administration of intravenous contrast. Automated exposure control, iterative reconstruction, and/or weight based adjustment of the mA/kV was utilized to reduce the radiation dose to as low as reasonably achievable. COMPARISON: None. HISTORY: ORDERING SYSTEM PROVIDED HISTORY: HA, dizziness, unsteady gait. TECHNOLOGIST PROVIDED HISTORY: HA, dizziness, unsteady gait. Decision Support Exception - unselect if not a suspected or confirmed emergency medical condition->Emergency Medical Condition (MA) Is the patient pregnant?->No Reason for Exam: Pt states body aches and headaches, headache worse today, FINDINGS: BRAIN/VENTRICLES: There is no acute intracranial hemorrhage, mass effect or midline shift. No abnormal extra-axial fluid collection. The gray-white differentiation is maintained without evidence of an acute infarct. There is no evidence of hydrocephalus. ORBITS: The visualized portion of the orbits demonstrate no acute abnormality. SINUSES: The visualized paranasal sinuses and mastoid air cells demonstrate no acute abnormality. SOFT TISSUES/SKULL:  No acute abnormality of the visualized skull or soft tissues. No acute intracranial abnormality.        LABS:  Labs Reviewed   BASIC METABOLIC PANEL - Abnormal; Notable for the following components:       Result Value    Glucose 100 (*)     All other components within normal limits   CBC WITH AUTO DIFFERENTIAL - Abnormal; Notable for the following components:    Hemoglobin 16.2 (*)     MCHC 35.0 (*)     Seg Neutrophils 68 (*)     Lymphocytes 10 (*)     Monocytes 10 (*)     Eosinophils % 10 (*)     Immature Granulocytes 1 (*)     Absolute Lymph # 0.38 (*)     All other components within normal limits   URINALYSIS WITH MICROSCOPIC - Abnormal; Notable for the following components:    Turbidity UA SLIGHTLY CLOUDY (*)     Amorphous, UA 3+ (*)     All other components within normal limits   MAGNESIUM       All other labs were within normal range or not returned as of this dictation. EMERGENCY DEPARTMENT COURSE and DIFFERENTIAL DIAGNOSIS/MDM:   Vitals:    Vitals:    06/20/22 1126   BP: (!) 136/99   Pulse: 95   Resp: 15   Temp: 97.4 °F (36.3 °C)   SpO2: 96%   Weight: 240 lb (108.9 kg)   Height: 5' 8\" (1.727 m)     MEDICATIONS GIVEN IN THE ED:  Medications   methylPREDNISolone sodium (SOLU-MEDROL) 500 mg in sodium chloride 0.9 % 250 mL IVPB (500 mg IntraVENous New Bag 6/20/22 1400)       CLINICAL DECISION MAKING:  The patient presented alert with a nontoxic appearance and was seen in conjunction with Dr. Adalberto Hanks. Her gait was steady here from the tx room to the restroom. She was given IV steroids here for a possible MS exacerbation. A prescription was written for prednisone. Follow up with pcp and neurology for a recheck, further evaluation and treatment. Evaluation and treatment course in the ED, and plan of care upon discharge was discussed in length with the patient. Patient had no further questions prior to being discharged and was instructed to return to the ED for new or worsening symptoms. Care was provided during an unprecedented national emergency due to the novel coronavirus, Covid-19. FINAL IMPRESSION      1.  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    Migraine variant, intractable G43.819    Intractable migraine without aura and without status migrainosus G43.019    [facility] G35    Hypothyroidism E03.9    Class 2 obesity due to excess calories with body mass index (BMI) of 36.0 to 36.9 in adult E66.09, Z68.36    LAWRENCE (obstructive sleep apnea) G47.33    Steroid-induced hyperglycemia R73.9, T38.0X5A    Multiple sclerosis exacerbation ([facility] G35    Headache-bifrontal R51.9         DISPOSITION/PLAN   DISPOSITION  DISCHARGE      PATIENT REFERRED TO:   QIAN Marie - CNP  [address]  [address] [facility]    Schedule an appointment as soon as possible for a visit       [facility] ED  [address] [facility]  [phone]    If symptoms worsen, As needed    Anthony Kellogg MD  [address] [place] [address]  [phone]    Schedule an appointment as soon as possible for a visit         DISCHARGE MEDICATIONS:     New Prescriptions    PREDNISONE (DELTASONE) 10 MG TABLET    Take five tablets on days 1-2, four tablets on days 3-4, three tablets on days 5-6, two tablets on day 7-8, one tablet on days 9-10.            (Please note that portions of this note were completed with a voice recognition program.  Efforts were made to edit the dictations but occasionally words are mis-transcribed.)    Chun Crook, APRN - CNP      Mccollum Mebane, APRN - CNP  06/20/22 3023

## 2022-09-17 ENCOUNTER — HOSPITAL ENCOUNTER (EMERGENCY)
Age: 47
Discharge: HOME | End: 2022-09-17
Attending: EMERGENCY MEDICINE
Payer: COMMERCIAL

## 2022-09-17 VITALS
WEIGHT: 230 LBS | OXYGEN SATURATION: 96 % | DIASTOLIC BLOOD PRESSURE: 83 MMHG | BODY MASS INDEX: 36.1 KG/M2 | HEART RATE: 88 BPM | TEMPERATURE: 98.7 F | RESPIRATION RATE: 16 BRPM | HEIGHT: 67 IN | SYSTOLIC BLOOD PRESSURE: 120 MMHG

## 2022-09-17 DIAGNOSIS — L03.115 CELLULITIS OF RIGHT LOWER EXTREMITY: Primary | ICD-10-CM

## 2022-09-17 LAB
ABSOLUTE EOS #: 0.7 K/UL (ref 0–0.44)
ABSOLUTE IMMATURE GRANULOCYTE: 0.12 K/UL (ref 0–0.3)
ABSOLUTE LYMPH #: 0.46 K/UL (ref 1.1–3.7)
ABSOLUTE MONO #: 0.93 K/UL (ref 0.1–1.2)
ANION GAP SERPL CALCULATED.3IONS-SCNC: 10 MMOL/L (ref 9–17)
BASOPHILS # BLD: 0 % (ref 0–2)
BASOPHILS ABSOLUTE: 0 K/UL (ref 0–0.2)
BUN BLDV-MCNC: 15 MG/DL (ref 6–20)
BUN/CREAT BLD: 23 (ref 9–20)
C-REACTIVE PROTEIN: 32.8 MG/L (ref 0–5)
CALCIUM SERPL-MCNC: 9 MG/DL (ref 8.6–10.4)
CHLORIDE BLD-SCNC: 108 MMOL/L (ref 98–107)
CO2: 22 MMOL/L (ref 20–31)
CREAT SERPL-MCNC: 0.65 MG/DL (ref 0.5–0.9)
EOSINOPHILS RELATIVE PERCENT: 6 % (ref 1–4)
GFR AFRICAN AMERICAN: >60 ML/MIN
GFR NON-AFRICAN AMERICAN: >60 ML/MIN
GFR SERPL CREATININE-BSD FRML MDRD: ABNORMAL ML/MIN/{1.73_M2}
GLUCOSE BLD-MCNC: 104 MG/DL (ref 70–99)
HCT VFR BLD CALC: 43.7 % (ref 36.3–47.1)
HEMOGLOBIN: 14.8 G/DL (ref 11.9–15.1)
IMMATURE GRANULOCYTES: 1 %
LACTIC ACID: 1.2 MMOL/L (ref 0.5–2.2)
LYMPHOCYTES # BLD: 4 % (ref 24–43)
MCH RBC QN AUTO: 31.8 PG (ref 25.2–33.5)
MCHC RBC AUTO-ENTMCNC: 33.9 G/DL (ref 28.4–34.8)
MCV RBC AUTO: 94 FL (ref 82.6–102.9)
MONOCYTES # BLD: 8 % (ref 3–12)
NRBC AUTOMATED: 0 PER 100 WBC
PDW BLD-RTO: 12.9 % (ref 11.8–14.4)
PLATELET # BLD: 287 K/UL (ref 138–453)
PMV BLD AUTO: 9.4 FL (ref 8.1–13.5)
POTASSIUM SERPL-SCNC: 3.3 MMOL/L (ref 3.7–5.3)
RBC # BLD: 4.65 M/UL (ref 3.95–5.11)
SEDIMENTATION RATE, ERYTHROCYTE: 16 MM/HR (ref 0–20)
SEG NEUTROPHILS: 81 % (ref 36–65)
SEGMENTED NEUTROPHILS ABSOLUTE COUNT: 9.39 K/UL (ref 1.5–8.1)
SODIUM BLD-SCNC: 140 MMOL/L (ref 135–144)
WBC # BLD: 11.6 K/UL (ref 3.5–11.3)

## 2022-09-17 PROCEDURE — 85025 COMPLETE CBC W/AUTO DIFF WBC: CPT

## 2022-09-17 PROCEDURE — 2500000003 HC RX 250 WO HCPCS: Performed by: NURSE PRACTITIONER

## 2022-09-17 PROCEDURE — 83605 ASSAY OF LACTIC ACID: CPT

## 2022-09-17 PROCEDURE — 6360000002 HC RX W HCPCS: Performed by: NURSE PRACTITIONER

## 2022-09-17 PROCEDURE — 96375 TX/PRO/DX INJ NEW DRUG ADDON: CPT

## 2022-09-17 PROCEDURE — 2580000003 HC RX 258: Performed by: NURSE PRACTITIONER

## 2022-09-17 PROCEDURE — 86140 C-REACTIVE PROTEIN: CPT

## 2022-09-17 PROCEDURE — 85652 RBC SED RATE AUTOMATED: CPT

## 2022-09-17 PROCEDURE — 80048 BASIC METABOLIC PNL TOTAL CA: CPT

## 2022-09-17 PROCEDURE — 96365 THER/PROPH/DIAG IV INF INIT: CPT

## 2022-09-17 PROCEDURE — 99284 EMERGENCY DEPT VISIT MOD MDM: CPT

## 2022-09-17 RX ORDER — FENTANYL CITRATE 0.05 MG/ML
50 INJECTION, SOLUTION INTRAMUSCULAR; INTRAVENOUS ONCE
Status: COMPLETED | OUTPATIENT
Start: 2022-09-17 | End: 2022-09-17

## 2022-09-17 RX ORDER — HYDROCODONE BITARTRATE AND ACETAMINOPHEN 5; 325 MG/1; MG/1
1 TABLET ORAL EVERY 8 HOURS PRN
Qty: 12 TABLET | Refills: 0 | Status: SHIPPED | OUTPATIENT
Start: 2022-09-17 | End: 2022-09-21

## 2022-09-17 RX ORDER — CLINDAMYCIN PHOSPHATE 600 MG/50ML
600 INJECTION INTRAVENOUS ONCE
Status: COMPLETED | OUTPATIENT
Start: 2022-09-17 | End: 2022-09-17

## 2022-09-17 RX ORDER — SODIUM CHLORIDE 9 MG/ML
INJECTION, SOLUTION INTRAVENOUS CONTINUOUS
Status: DISCONTINUED | OUTPATIENT
Start: 2022-09-17 | End: 2022-09-17 | Stop reason: HOSPADM

## 2022-09-17 RX ORDER — ONDANSETRON 2 MG/ML
4 INJECTION INTRAMUSCULAR; INTRAVENOUS ONCE
Status: COMPLETED | OUTPATIENT
Start: 2022-09-17 | End: 2022-09-17

## 2022-09-17 RX ADMIN — CLINDAMYCIN PHOSPHATE 600 MG: 600 INJECTION, SOLUTION INTRAVENOUS at 16:51

## 2022-09-17 RX ADMIN — SODIUM CHLORIDE: 9 INJECTION, SOLUTION INTRAVENOUS at 16:50

## 2022-09-17 RX ADMIN — FENTANYL CITRATE 50 MCG: 0.05 INJECTION, SOLUTION INTRAMUSCULAR; INTRAVENOUS at 16:38

## 2022-09-17 RX ADMIN — ONDANSETRON 4 MG: 2 INJECTION INTRAMUSCULAR; INTRAVENOUS at 16:38

## 2022-09-17 ASSESSMENT — PAIN SCALES - GENERAL: PAINLEVEL_OUTOF10: 5

## 2022-09-17 ASSESSMENT — ENCOUNTER SYMPTOMS
SHORTNESS OF BREATH: 0
COLOR CHANGE: 1
ABDOMINAL PAIN: 0

## 2022-09-17 NOTE — ED PROVIDER NOTES
EMERGENCY DEPARTMENT ENCOUNTER   ATTENDING ATTESTATION     Pt Name: Celia Vela  MRN: [id number]  [place] 1975  Date of evaluation: 9/17/22   Celia Vela is a 56 y.o. female with CC: Leg Pain (Patient has redness and swelling with drainage to the right leg on the lateral knee. )      MDM: Abscess, redness, pain, swelling to the lateral aspect of the right knee. Believes that she may or may not have been bitten by a spider and noticed a small black bump and so she drained it at home. Was started on clindamycin and took her first dose this morning. Was concerned that she is having more pain so came to the emergency department. Initial evaluation she is resting in bed in no acute distress. She has a focal area of induration that is about a nickel size with surrounding erythema and warmth. There is active serous drainage from the area. No focal area of fluctuance. She does have full range of motion of the knee. There is no joint effusion. Mild leukocytosis and mild elevation in CRP which is to be expected. However I would expect these labs to be much more elevated if she had a more severe infection or if this was a case of bacteremia or septic joint. She was given a dose of IV clindamycin here. Discussed with patient that she should give antibiotics about 24 to 48 hours to really start working and continue taking her oral doses at home. She can also apply topical antibiotic ointment as desired.   If she has persistent fevers nausea or vomiting can no longer bend her knee or is any spreading of the redness or has pain at home that she cannot control she should return the emergency department immediately for reevaluation       CRITICAL CARE: NONE    EKG: All EKG's are interpreted by the Emergency Department Physician who either signs or Co-signs this chart in the absence of a cardiologist.    RADIOLOGY:All plain film, CT, MRI, and formal ultrasound images (except ED bedside ultrasound) are read by the radiologist, see reports below, unless otherwise noted in MDM or here. No orders to display       LABS: All lab results were reviewed by myself, and all abnormals are listed below. Labs Reviewed   BASIC METABOLIC PANEL - Abnormal; Notable for the following components:       Result Value    Glucose 104 (*)     Bun/Cre Ratio 23 (*)     Potassium 3.3 (*)     Chloride 108 (*)     All other components within normal limits   CBC WITH AUTO DIFFERENTIAL - Abnormal; Notable for the following components:    WBC 11.6 (*)     All other components within normal limits   C-REACTIVE PROTEIN - Abnormal; Notable for the following components:    CRP 32.8 (*)     All other components within normal limits   LACTIC ACID   SEDIMENTATION RATE       CONSULTS:  None    FINAL IMPRESSION      1.  Cellulitis of right lower extremity          PASTMEDICAL HISTORY     Past Medical History:   Diagnosis Date    Balance problem     COVID-19 virus infection 02/2021    Dizziness     History of depression     Hypothyroidism     Migraines     Multiple sclerosis (HCC)     Restless leg syndrome     Sleep apnea     patient does not wear CPAP       SURGICAL HISTORY       Past Surgical History:   Procedure Laterality Date    BACK SURGERY  08/23/2021    T10-11 posterior lateral fusion    CARPAL TUNNEL RELEASE Left 10-    CARPAL TUNNEL RELEASE Right 10-    CHOLECYSTECTOMY      HYSTERECTOMY (CERVIX STATUS UNKNOWN)      LAMINECTOMY N/A 8/23/2021    T10- T11  POSTERIOR LATERAL    FUSION  & INSTRUMENTATION    WITH NEUROMONITORING   EVOKES    NUVASIVE  2 C-ARM performed by Jayme Valladares MD at [address] [place] [address]       CURRENT MEDICATIONS       Previous Medications    ERENUMAB-AOOE (AIMOVIG, 140 MG DOSE,) 70 MG/ML SOAJ    Inject 140 mg into the skin every 30 days    FOLIC ACID (FOLVITE) 1 MG TABLET    Take 1 tablet by mouth daily    LEVOTHYROXINE (SYNTHROID) 25 MCG TABLET    Take 25 mcg by mouth Daily     LIRAGLUTIDE-WEIGHT MANAGEMENT (SAXENDA) 18 MG/3ML SOPN    Inject 3 mg into the skin daily     ONDANSETRON (ZOFRAN ODT) 4 MG DISINTEGRATING TABLET    Take 4 mg by mouth daily as needed for Nausea or Vomiting    PREDNISONE (DELTASONE) 10 MG TABLET    Take five tablets on days 1-2, four tablets on days 3-4, three tablets on days 5-6, two tablets on day 7-8, one tablet on days 9-10. ROPINIROLE (REQUIP) 2 MG TABLET    Take 2 mg by mouth 2 times daily    TIZANIDINE (ZANAFLEX) 4 MG TABLET    Take 1 tablet by mouth every 8 hours as needed (spasm)    TOPIRAMATE (TOPAMAX) 25 MG TABLET    Take 25 mg by mouth 2 times daily     UBROGEPANT (UBRELVY) 100 MG TABS    Take 100 mg by mouth as needed (migraine)    VENLAFAXINE (EFFEXOR) 37.5 MG TABLET    Take 37.5 mg by mouth 2 times daily    VITAMIN B-12 (CYANOCOBALAMIN) 100 MCG TABLET    Take 100 mcg by mouth daily        ALLERGIES     is allergic to pcn [penicillins]. FAMILY HISTORY     She indicated that her father is . SOCIAL HISTORY       Social History     Tobacco Use    Smoking status: Never    Smokeless tobacco: Never   Vaping Use    Vaping Use: Never used   Substance Use Topics    Alcohol use: No    Drug use: No       This visit was performed by both a physician and an APC. I personally evaluated and examined the patient.  I performed all aspects of the MDM as documented     Mortimer Guys, DO  Attending Emergency Physician         Breanna Rivas DO  [phone]

## 2022-09-17 NOTE — ED PROVIDER NOTES
Team [address] ED  eMERGENCY dEPARTMENT eNCOUnter      Pt Name: Irma Valle  MRN: [id number]  [place] 1975  Date of evaluation: 9/17/2022  Provider: QIAN Dickey [id number]       Chief Complaint   Patient presents with    Leg Pain     Patient has redness and swelling with drainage to the right leg on the lateral knee. HISTORY OF PRESENT ILLNESS  (Location/Symptom, Timing/Onset, Context/Setting, Quality, Duration, Modifying Factors, Severity.)   Irma Valle is a 56 y.o. female who presents to the emergency department via private auto for pain, erythema, swelling to her right lateral knee area. She woke up a few days ago with erythema to the area. States there was a black center. The swelling and discomfort have since increased. States she used a blade earlier today at home to open the area. The area is draining. She was prescribed clindamycin yesterday; she took her first dose today. Rates her pain 10/10 at this time. Denies fever, chills, injury. Nursing Notes were reviewed. ALLERGIES     Pcn [penicillins]    CURRENT MEDICATIONS       Previous Medications    ERENUMAB-AOOE (AIMOVIG, 140 MG DOSE,) 70 MG/ML SOAJ    Inject 140 mg into the skin every 30 days    FOLIC ACID (FOLVITE) 1 MG TABLET    Take 1 tablet by mouth daily    LEVOTHYROXINE (SYNTHROID) 25 MCG TABLET    Take 25 mcg by mouth Daily     LIRAGLUTIDE-WEIGHT MANAGEMENT (SAXENDA) 18 MG/3ML SOPN    Inject 3 mg into the skin daily     ONDANSETRON (ZOFRAN ODT) 4 MG DISINTEGRATING TABLET    Take 4 mg by mouth daily as needed for Nausea or Vomiting    PREDNISONE (DELTASONE) 10 MG TABLET    Take five tablets on days 1-2, four tablets on days 3-4, three tablets on days 5-6, two tablets on day 7-8, one tablet on days 9-10.     ROPINIROLE (REQUIP) 2 MG TABLET    Take 2 mg by mouth 2 times daily    TIZANIDINE (ZANAFLEX) 4 MG TABLET    Take 1 tablet by mouth every 8 hours as needed (spasm)    TOPIRAMATE (TOPAMAX) 25 MG TABLET    Take 25 mg by mouth 2 times daily     UBROGEPANT (UBRELVY) 100 MG TABS    Take 100 mg by mouth as needed (migraine)    VENLAFAXINE (EFFEXOR) 37.5 MG TABLET    Take 37.5 mg by mouth 2 times daily    VITAMIN B-12 (CYANOCOBALAMIN) 100 MCG TABLET    Take 100 mcg by mouth daily        PAST MEDICAL HISTORY         Diagnosis Date    Balance problem     COVID-19 virus infection 2021    Dizziness     History of depression     Hypothyroidism     Migraines     Multiple sclerosis (HCC)     Restless leg syndrome     Sleep apnea     patient does not wear CPAP       SURGICAL HISTORY           Procedure Laterality Date    BACK SURGERY  2021    T10-11 posterior lateral fusion    CARPAL TUNNEL RELEASE Left 10-    CARPAL TUNNEL RELEASE Right 10-    CHOLECYSTECTOMY      HYSTERECTOMY (CERVIX STATUS UNKNOWN)      LAMINECTOMY N/A 2021    T10- T11  POSTERIOR LATERAL    FUSION  & INSTRUMENTATION    WITH NEUROMONITORING   EVOKES    NUVASIVE  2 C-ARM performed by Oscar Verde MD at [address] [place] [address]         FAMILY HISTORY           Problem Relation Age of Onset    Heart Attack Father      Family Status   Relation Name Status    Father          SOCIAL HISTORY      reports that she has never smoked. She has never used smokeless tobacco. She reports that she does not drink alcohol and does not use drugs. REVIEW OF SYSTEMS    (2-9 systems for level 4, 10 or more for level 5)     Review of Systems   Constitutional:  Negative for chills, diaphoresis, fatigue and fever. Respiratory:  Negative for shortness of breath. Cardiovascular:  Negative for chest pain. Gastrointestinal:  Negative for abdominal pain. Musculoskeletal:  Positive for myalgias. Negative for arthralgias. Skin:  Positive for color change and wound. Negative for rash. Neurological:  Negative for weakness and numbness.     Except as noted above the remainder limits   SEDIMENTATION RATE   LACTIC ACID       All other labs were within normal range or not returned as of this dictation. EMERGENCY DEPARTMENT COURSE and DIFFERENTIAL DIAGNOSIS/MDM:   Vitals:    Vitals:    09/17/22 1540 09/17/22 1824   BP: (!) 140/101 120/83   Pulse: (!) 112 88   Resp: 18 16   Temp: 98.7 °F (37.1 °C)    TempSrc: Oral    SpO2: 97% 96%   Weight: 230 lb (104.3 kg)    Height: 5' 7\" (1.702 m)          MEDICATIONS GIVEN IN THE ED:  Medications   0.9 % sodium chloride infusion ( IntraVENous New Bag 9/17/22 1650)   ondansetron (ZOFRAN) injection 4 mg (4 mg IntraVENous Given 9/17/22 1638)   fentaNYL (SUBLIMAZE) injection 50 mcg (50 mcg IntraVENous Given 9/17/22 1638)   clindamycin (CLEOCIN) 600 mg in dextrose 5 % 50 mL IVPB (0 mg IntraVENous Stopped 9/17/22 1722)       CLINICAL DECISION MAKING:  The patient presented alert with a nontoxic appearance and was seen in conjunction with Dr. Harjinder Sanders. Her CRP was 32.8. She has a prescription for clindamycin that she was advised to take as directed. [place] was reviewed. A prescription was written for norco. The patient was advised to not drink alcohol, drive, or operate heavy machinery while taking the norco. Follow up with pcp for a recheck, further evaluation and treatment. Evaluation and treatment course in the ED, and plan of care upon discharge was discussed in length with the patient. Patient had no further questions prior to being discharged and was instructed to return to the ED for new or worsening symptoms. Care was provided during an unprecedented national emergency due to the novel coronavirus, Covid-19. FINAL IMPRESSION      1.  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

## 2022-10-20 ENCOUNTER — HOSPITAL ENCOUNTER (EMERGENCY)
Age: 47
Discharge: HOME | End: 2022-10-20

## 2023-04-04 ENCOUNTER — HOSPITAL ENCOUNTER (INPATIENT)
Age: 48
LOS: 1 days | Discharge: HOME | End: 2023-04-05
Attending: EMERGENCY MEDICINE | Admitting: FAMILY MEDICINE
Payer: COMMERCIAL

## 2023-04-04 ENCOUNTER — APPOINTMENT (OUTPATIENT)
Dept: MRI IMAGING | Age: 48
End: 2023-04-04
Payer: COMMERCIAL

## 2023-04-04 DIAGNOSIS — G35 MULTIPLE SCLEROSIS EXACERBATION (HCC): Primary | ICD-10-CM

## 2023-04-04 DIAGNOSIS — M48.02 CERVICAL STENOSIS OF SPINAL CANAL: ICD-10-CM

## 2023-04-04 DIAGNOSIS — M79.10 MYALGIA: ICD-10-CM

## 2023-04-04 PROBLEM — M79.601 PAIN IN BOTH UPPER EXTREMITIES: Status: ACTIVE | Noted: 2023-04-04

## 2023-04-04 PROBLEM — M79.602 PAIN IN BOTH UPPER EXTREMITIES: Status: ACTIVE | Noted: 2023-04-04

## 2023-04-04 PROBLEM — G25.81 RESTLESS LEG: Status: ACTIVE | Noted: 2017-06-08

## 2023-04-04 LAB
ABSOLUTE EOS #: 0.43 K/UL (ref 0–0.4)
ABSOLUTE IMMATURE GRANULOCYTE: 0 K/UL (ref 0–0.3)
ABSOLUTE LYMPH #: 0.54 K/UL (ref 1–4.8)
ABSOLUTE MONO #: 0.29 K/UL (ref 0.2–0.8)
ANION GAP SERPL CALCULATED.3IONS-SCNC: 11 MMOL/L (ref 9–17)
BASOPHILS # BLD: 0 %
BASOPHILS ABSOLUTE: 0 K/UL (ref 0–0.2)
BUN SERPL-MCNC: 17 MG/DL (ref 6–20)
BUN/CREAT BLD: 27 (ref 9–20)
CALCIUM SERPL-MCNC: 8.5 MG/DL (ref 8.6–10.4)
CHLORIDE SERPL-SCNC: 108 MMOL/L (ref 98–107)
CK SERPL-CCNC: 121 U/L (ref 26–192)
CO2 SERPL-SCNC: 21 MMOL/L (ref 20–31)
CREAT SERPL-MCNC: 0.63 MG/DL (ref 0.5–0.9)
CRP SERPL HS-MCNC: <3 MG/L (ref 0–5)
EOSINOPHILS RELATIVE PERCENT: 12 % (ref 1–4)
ERYTHROCYTE [SEDIMENTATION RATE] IN BLOOD BY WESTERGREN METHOD: 5 MM/HR (ref 0–20)
GFR SERPL CREATININE-BSD FRML MDRD: >60 ML/MIN/1.73M2
GLUCOSE SERPL-MCNC: 120 MG/DL (ref 70–99)
HCT VFR BLD AUTO: 43.5 % (ref 36.3–47.1)
HGB BLD-MCNC: 14.5 G/DL (ref 11.9–15.1)
IMMATURE GRANULOCYTES: 0 %
LYMPHOCYTES # BLD: 15 % (ref 24–44)
MCH RBC QN AUTO: 30.8 PG (ref 25.2–33.5)
MCHC RBC AUTO-ENTMCNC: 33.3 G/DL (ref 28.4–34.8)
MCV RBC AUTO: 92.4 FL (ref 82.6–102.9)
MONOCYTES # BLD: 8 % (ref 1–7)
MYOGLOBIN SERPL-MCNC: 39 NG/ML (ref 25–58)
NRBC AUTOMATED: 0 PER 100 WBC
PDW BLD-RTO: 13.2 % (ref 11.8–14.4)
PLATELET # BLD AUTO: 254 K/UL (ref 138–453)
PMV BLD AUTO: 9.5 FL (ref 8.1–13.5)
POTASSIUM SERPL-SCNC: 3.6 MMOL/L (ref 3.7–5.3)
RBC # BLD: 4.71 M/UL (ref 3.95–5.11)
SEG NEUTROPHILS: 65 % (ref 36–66)
SEGMENTED NEUTROPHILS ABSOLUTE COUNT: 2.34 K/UL (ref 1.8–7.7)
SODIUM SERPL-SCNC: 140 MMOL/L (ref 135–144)
WBC # BLD AUTO: 3.6 K/UL (ref 3.5–11.3)

## 2023-04-04 PROCEDURE — 72141 MRI NECK SPINE W/O DYE: CPT

## 2023-04-04 PROCEDURE — C9113 INJ PANTOPRAZOLE SODIUM, VIA: HCPCS | Performed by: NURSE PRACTITIONER

## 2023-04-04 PROCEDURE — 99222 1ST HOSP IP/OBS MODERATE 55: CPT | Performed by: NURSE PRACTITIONER

## 2023-04-04 PROCEDURE — 6360000002 HC RX W HCPCS: Performed by: NURSE PRACTITIONER

## 2023-04-04 PROCEDURE — 6360000002 HC RX W HCPCS: Performed by: EMERGENCY MEDICINE

## 2023-04-04 PROCEDURE — 99222 1ST HOSP IP/OBS MODERATE 55: CPT | Performed by: PSYCHIATRY & NEUROLOGY

## 2023-04-04 PROCEDURE — 96375 TX/PRO/DX INJ NEW DRUG ADDON: CPT

## 2023-04-04 PROCEDURE — 6370000000 HC RX 637 (ALT 250 FOR IP): Performed by: NURSE PRACTITIONER

## 2023-04-04 PROCEDURE — 85025 COMPLETE CBC W/AUTO DIFF WBC: CPT

## 2023-04-04 PROCEDURE — 82550 ASSAY OF CK (CPK): CPT

## 2023-04-04 PROCEDURE — 2580000003 HC RX 258: Performed by: NURSE PRACTITIONER

## 2023-04-04 PROCEDURE — 86140 C-REACTIVE PROTEIN: CPT

## 2023-04-04 PROCEDURE — 83874 ASSAY OF MYOGLOBIN: CPT

## 2023-04-04 PROCEDURE — 99285 EMERGENCY DEPT VISIT HI MDM: CPT

## 2023-04-04 PROCEDURE — 85652 RBC SED RATE AUTOMATED: CPT

## 2023-04-04 PROCEDURE — 1200000000 HC SEMI PRIVATE

## 2023-04-04 PROCEDURE — 96374 THER/PROPH/DIAG INJ IV PUSH: CPT

## 2023-04-04 PROCEDURE — A4216 STERILE WATER/SALINE, 10 ML: HCPCS | Performed by: NURSE PRACTITIONER

## 2023-04-04 PROCEDURE — 80048 BASIC METABOLIC PNL TOTAL CA: CPT

## 2023-04-04 RX ORDER — TIZANIDINE 4 MG/1
4 TABLET ORAL EVERY 8 HOURS PRN
Status: DISCONTINUED | OUTPATIENT
Start: 2023-04-04 | End: 2023-04-05 | Stop reason: HOSPADM

## 2023-04-04 RX ORDER — PREGABALIN 100 MG/1
100 CAPSULE ORAL NIGHTLY
Status: DISCONTINUED | OUTPATIENT
Start: 2023-04-04 | End: 2023-04-04

## 2023-04-04 RX ORDER — POTASSIUM CHLORIDE 20 MEQ/1
40 TABLET, EXTENDED RELEASE ORAL PRN
Status: DISCONTINUED | OUTPATIENT
Start: 2023-04-04 | End: 2023-04-05 | Stop reason: HOSPADM

## 2023-04-04 RX ORDER — TOPIRAMATE 25 MG/1
25 TABLET ORAL 2 TIMES DAILY
Status: DISCONTINUED | OUTPATIENT
Start: 2023-04-04 | End: 2023-04-04

## 2023-04-04 RX ORDER — ONDANSETRON 2 MG/ML
4 INJECTION INTRAMUSCULAR; INTRAVENOUS ONCE
Status: COMPLETED | OUTPATIENT
Start: 2023-04-04 | End: 2023-04-04

## 2023-04-04 RX ORDER — ACETAMINOPHEN 325 MG/1
650 TABLET ORAL EVERY 6 HOURS PRN
Status: DISCONTINUED | OUTPATIENT
Start: 2023-04-04 | End: 2023-04-05 | Stop reason: HOSPADM

## 2023-04-04 RX ORDER — OFATUMUMAB 20 MG/.4ML
INJECTION, SOLUTION SUBCUTANEOUS
COMMUNITY

## 2023-04-04 RX ORDER — SODIUM CHLORIDE 0.9 % (FLUSH) 0.9 %
10 SYRINGE (ML) INJECTION PRN
Status: DISCONTINUED | OUTPATIENT
Start: 2023-04-04 | End: 2023-04-05 | Stop reason: HOSPADM

## 2023-04-04 RX ORDER — ACETAMINOPHEN 650 MG/1
650 SUPPOSITORY RECTAL EVERY 6 HOURS PRN
Status: DISCONTINUED | OUTPATIENT
Start: 2023-04-04 | End: 2023-04-05 | Stop reason: HOSPADM

## 2023-04-04 RX ORDER — SODIUM CHLORIDE 9 MG/ML
INJECTION, SOLUTION INTRAVENOUS PRN
Status: DISCONTINUED | OUTPATIENT
Start: 2023-04-04 | End: 2023-04-05 | Stop reason: HOSPADM

## 2023-04-04 RX ORDER — POTASSIUM CHLORIDE 7.45 MG/ML
10 INJECTION INTRAVENOUS PRN
Status: DISCONTINUED | OUTPATIENT
Start: 2023-04-04 | End: 2023-04-05 | Stop reason: HOSPADM

## 2023-04-04 RX ORDER — METHYLPREDNISOLONE SODIUM SUCCINATE 125 MG/2ML
125 INJECTION, POWDER, LYOPHILIZED, FOR SOLUTION INTRAMUSCULAR; INTRAVENOUS ONCE
Status: COMPLETED | OUTPATIENT
Start: 2023-04-04 | End: 2023-04-04

## 2023-04-04 RX ORDER — ROPINIROLE 1 MG/1
2 TABLET, FILM COATED ORAL 2 TIMES DAILY
Status: DISCONTINUED | OUTPATIENT
Start: 2023-04-04 | End: 2023-04-04

## 2023-04-04 RX ORDER — PREGABALIN 100 MG/1
200 CAPSULE ORAL NIGHTLY
Status: DISCONTINUED | OUTPATIENT
Start: 2023-04-04 | End: 2023-04-05 | Stop reason: HOSPADM

## 2023-04-04 RX ORDER — PREGABALIN 100 MG/1
CAPSULE ORAL
COMMUNITY
Start: 2023-03-27

## 2023-04-04 RX ORDER — VENLAFAXINE 37.5 MG/1
37.5 TABLET ORAL 2 TIMES DAILY
COMMUNITY

## 2023-04-04 RX ORDER — ROPINIROLE 1 MG/1
4 TABLET, FILM COATED ORAL NIGHTLY
Status: DISCONTINUED | OUTPATIENT
Start: 2023-04-04 | End: 2023-04-05 | Stop reason: HOSPADM

## 2023-04-04 RX ORDER — ONDANSETRON 4 MG/1
4 TABLET, ORALLY DISINTEGRATING ORAL EVERY 8 HOURS PRN
Status: DISCONTINUED | OUTPATIENT
Start: 2023-04-04 | End: 2023-04-05 | Stop reason: HOSPADM

## 2023-04-04 RX ORDER — TOPIRAMATE 25 MG/1
25 TABLET ORAL NIGHTLY
Status: DISCONTINUED | OUTPATIENT
Start: 2023-04-04 | End: 2023-04-04

## 2023-04-04 RX ORDER — ONDANSETRON 2 MG/ML
4 INJECTION INTRAMUSCULAR; INTRAVENOUS EVERY 6 HOURS PRN
Status: DISCONTINUED | OUTPATIENT
Start: 2023-04-04 | End: 2023-04-05 | Stop reason: HOSPADM

## 2023-04-04 RX ORDER — ENOXAPARIN SODIUM 100 MG/ML
30 INJECTION SUBCUTANEOUS 2 TIMES DAILY
Status: DISCONTINUED | OUTPATIENT
Start: 2023-04-04 | End: 2023-04-05 | Stop reason: HOSPADM

## 2023-04-04 RX ORDER — MORPHINE SULFATE 4 MG/ML
4 INJECTION, SOLUTION INTRAMUSCULAR; INTRAVENOUS ONCE
Status: COMPLETED | OUTPATIENT
Start: 2023-04-04 | End: 2023-04-04

## 2023-04-04 RX ORDER — POLYETHYLENE GLYCOL 3350 17 G/17G
17 POWDER, FOR SOLUTION ORAL DAILY PRN
Status: DISCONTINUED | OUTPATIENT
Start: 2023-04-04 | End: 2023-04-05 | Stop reason: HOSPADM

## 2023-04-04 RX ORDER — VENLAFAXINE 37.5 MG/1
37.5 TABLET ORAL 2 TIMES DAILY
Status: DISCONTINUED | OUTPATIENT
Start: 2023-04-04 | End: 2023-04-05 | Stop reason: HOSPADM

## 2023-04-04 RX ORDER — MORPHINE SULFATE 2 MG/ML
2 INJECTION, SOLUTION INTRAMUSCULAR; INTRAVENOUS EVERY 4 HOURS PRN
Status: DISCONTINUED | OUTPATIENT
Start: 2023-04-04 | End: 2023-04-05 | Stop reason: HOSPADM

## 2023-04-04 RX ORDER — MAGNESIUM SULFATE 1 G/100ML
1000 INJECTION INTRAVENOUS PRN
Status: DISCONTINUED | OUTPATIENT
Start: 2023-04-04 | End: 2023-04-05 | Stop reason: HOSPADM

## 2023-04-04 RX ORDER — PREGABALIN 100 MG/1
100 CAPSULE ORAL 2 TIMES DAILY
Status: DISCONTINUED | OUTPATIENT
Start: 2023-04-04 | End: 2023-04-04

## 2023-04-04 RX ORDER — ROPINIROLE 1 MG/1
2 TABLET, FILM COATED ORAL NIGHTLY
Status: DISCONTINUED | OUTPATIENT
Start: 2023-04-04 | End: 2023-04-04

## 2023-04-04 RX ORDER — METHYLPREDNISOLONE SODIUM SUCCINATE 125 MG/2ML
250 INJECTION, POWDER, LYOPHILIZED, FOR SOLUTION INTRAMUSCULAR; INTRAVENOUS EVERY 6 HOURS
Status: DISCONTINUED | OUTPATIENT
Start: 2023-04-04 | End: 2023-04-04

## 2023-04-04 RX ORDER — SODIUM CHLORIDE 0.9 % (FLUSH) 0.9 %
5-40 SYRINGE (ML) INJECTION EVERY 12 HOURS SCHEDULED
Status: DISCONTINUED | OUTPATIENT
Start: 2023-04-04 | End: 2023-04-05 | Stop reason: HOSPADM

## 2023-04-04 RX ORDER — LEVOTHYROXINE SODIUM 0.03 MG/1
25 TABLET ORAL DAILY
Status: DISCONTINUED | OUTPATIENT
Start: 2023-04-04 | End: 2023-04-05 | Stop reason: HOSPADM

## 2023-04-04 RX ORDER — TOPIRAMATE 25 MG/1
50 TABLET ORAL NIGHTLY
Status: DISCONTINUED | OUTPATIENT
Start: 2023-04-04 | End: 2023-04-05 | Stop reason: HOSPADM

## 2023-04-04 RX ADMIN — ENOXAPARIN SODIUM 30 MG: 100 INJECTION SUBCUTANEOUS at 11:13

## 2023-04-04 RX ADMIN — SODIUM CHLORIDE, PRESERVATIVE FREE 10 ML: 5 INJECTION INTRAVENOUS at 20:58

## 2023-04-04 RX ADMIN — VENLAFAXINE HYDROCHLORIDE 37.5 MG: 37.5 TABLET ORAL at 20:26

## 2023-04-04 RX ADMIN — VENLAFAXINE HYDROCHLORIDE 37.5 MG: 37.5 TABLET ORAL at 12:08

## 2023-04-04 RX ADMIN — ENOXAPARIN SODIUM 30 MG: 100 INJECTION SUBCUTANEOUS at 20:26

## 2023-04-04 RX ADMIN — MORPHINE SULFATE 2 MG: 2 INJECTION, SOLUTION INTRAMUSCULAR; INTRAVENOUS at 14:19

## 2023-04-04 RX ADMIN — PREGABALIN 200 MG: 100 CAPSULE ORAL at 20:25

## 2023-04-04 RX ADMIN — SODIUM CHLORIDE 250 MG: 9 INJECTION, SOLUTION INTRAVENOUS at 21:01

## 2023-04-04 RX ADMIN — SODIUM CHLORIDE, PRESERVATIVE FREE 10 ML: 5 INJECTION INTRAVENOUS at 10:30

## 2023-04-04 RX ADMIN — TOPIRAMATE 50 MG: 25 TABLET, FILM COATED ORAL at 20:25

## 2023-04-04 RX ADMIN — SODIUM CHLORIDE, PRESERVATIVE FREE 40 MG: 5 INJECTION INTRAVENOUS at 11:12

## 2023-04-04 RX ADMIN — METHYLPREDNISOLONE SODIUM SUCCINATE 125 MG: 125 INJECTION, POWDER, FOR SOLUTION INTRAMUSCULAR; INTRAVENOUS at 07:54

## 2023-04-04 RX ADMIN — MORPHINE SULFATE 2 MG: 2 INJECTION, SOLUTION INTRAMUSCULAR; INTRAVENOUS at 18:45

## 2023-04-04 RX ADMIN — MORPHINE SULFATE 4 MG: 4 INJECTION, SOLUTION INTRAMUSCULAR; INTRAVENOUS at 07:54

## 2023-04-04 RX ADMIN — SODIUM CHLORIDE 250 MG: 9 INJECTION, SOLUTION INTRAVENOUS at 15:12

## 2023-04-04 RX ADMIN — MORPHINE SULFATE 2 MG: 2 INJECTION, SOLUTION INTRAMUSCULAR; INTRAVENOUS at 10:16

## 2023-04-04 RX ADMIN — ROPINIROLE HYDROCHLORIDE 4 MG: 1 TABLET, FILM COATED ORAL at 20:25

## 2023-04-04 RX ADMIN — ONDANSETRON 4 MG: 2 INJECTION INTRAMUSCULAR; INTRAVENOUS at 07:54

## 2023-04-04 ASSESSMENT — ENCOUNTER SYMPTOMS
ABDOMINAL PAIN: 1
CHEST TIGHTNESS: 0
CONSTIPATION: 0
VOMITING: 0
EYE DISCHARGE: 0
SHORTNESS OF BREATH: 0
COUGH: 0
EYE REDNESS: 0
RHINORRHEA: 0
COLOR CHANGE: 0
NAUSEA: 0
DIARRHEA: 0
SORE THROAT: 0

## 2023-04-04 ASSESSMENT — PAIN SCALES - GENERAL
PAINLEVEL_OUTOF10: 5
PAINLEVEL_OUTOF10: 6
PAINLEVEL_OUTOF10: 6
PAINLEVEL_OUTOF10: 8
PAINLEVEL_OUTOF10: 8
PAINLEVEL_OUTOF10: 3

## 2023-04-04 ASSESSMENT — PAIN DESCRIPTION - ORIENTATION
ORIENTATION: MID
ORIENTATION: RIGHT

## 2023-04-04 ASSESSMENT — PAIN DESCRIPTION - DESCRIPTORS
DESCRIPTORS: ACHING
DESCRIPTORS: SORE
DESCRIPTORS: ACHING

## 2023-04-04 ASSESSMENT — PAIN DESCRIPTION - LOCATION
LOCATION: HEAD
LOCATION: HEAD;NECK;BACK
LOCATION: ARM;BACK;HIP

## 2023-04-04 ASSESSMENT — PAIN DESCRIPTION - ONSET: ONSET: ON-GOING

## 2023-04-04 ASSESSMENT — PAIN - FUNCTIONAL ASSESSMENT
PAIN_FUNCTIONAL_ASSESSMENT: 0-10
PAIN_FUNCTIONAL_ASSESSMENT: ACTIVITIES ARE NOT PREVENTED

## 2023-04-04 ASSESSMENT — PAIN DESCRIPTION - PAIN TYPE: TYPE: ACUTE PAIN

## 2023-04-04 ASSESSMENT — PAIN DESCRIPTION - FREQUENCY: FREQUENCY: CONTINUOUS

## 2023-04-04 NOTE — ED PROVIDER NOTES
migrainosus G43.019    Multiple sclerosis ([address] G35    Hypothyroidism E03.9    Class 2 obesity due to excess calories with body mass index (BMI) of 36.0 to 36.9 in adult E66.09, Z68.36    LAWRENCE (obstructive sleep apnea) G47.33    Steroid-induced hyperglycemia R73.9, T38.0X5A    Multiple sclerosis exacerbation ([address] G35    Headache-bifrontal R51.9     SURGICAL HISTORY       Past Surgical History:   Procedure Laterality Date    BACK SURGERY  2021    T10-11 posterior lateral fusion    CARPAL TUNNEL RELEASE Left 10-    CARPAL TUNNEL RELEASE Right 10-    CHOLECYSTECTOMY      HYSTERECTOMY (CERVIX STATUS UNKNOWN)      LAMINECTOMY N/A 2021    T10- T11  POSTERIOR LATERAL    FUSION  & INSTRUMENTATION    WITH NEUROMONITORING   EVOKES    NUVASIVE  2 C-ARM performed by Erskin Aschoff, MD at [address]       Previous Medications    LEVOTHYROXINE (SYNTHROID) 25 MCG TABLET    Take 1 tablet by mouth Daily    OFATUMUMAB (KESIMPTA) 20 MG/0.4ML SOAJ    Inject into the skin every 30 days    ONDANSETRON (ZOFRAN-ODT) 4 MG DISINTEGRATING TABLET    Take 1 tablet by mouth daily as needed for Nausea or Vomiting    PREGABALIN (LYRICA) 100 MG CAPSULE        ROPINIROLE (REQUIP) 2 MG TABLET    Take 1 tablet by mouth 2 times daily    TIZANIDINE (ZANAFLEX) 4 MG TABLET    Take 1 tablet by mouth every 8 hours as needed (spasm)    TOPIRAMATE (TOPAMAX) 25 MG TABLET    Take 1 tablet by mouth 2 times daily    UBROGEPANT (UBRELVY) 100 MG TABS    Take 100 mg by mouth as needed (migraine)    VENLAFAXINE (EFFEXOR) 37.5 MG TABLET    Take 1 tablet by mouth 2 times daily     ALLERGIES     is allergic to pcn [penicillins]. FAMILY HISTORY     She indicated that her father is .      SOCIAL HISTORY       Social History     Tobacco Use    Smoking status: Never    Smokeless tobacco: Never   Vaping Use    Vaping Use: Never used   Substance Use Topics

## 2023-04-04 NOTE — H&P
(L) 8.6 - 10.4 mg/dL    Sodium 140 135 - 144 mmol/L    Potassium 3.6 (L) 3.7 - 5.3 mmol/L    Chloride 108 (H) 98 - 107 mmol/L    CO2 21 20 - 31 mmol/L    Anion Gap 11 9 - 17 mmol/L   CBC with Auto Differential    Collection Time: 04/04/23  7:50 AM   Result Value Ref Range    WBC 3.6 3.5 - 11.3 k/uL    RBC 4.71 3.95 - 5.11 m/uL    Hemoglobin 14.5 11.9 - 15.1 g/dL    Hematocrit 43.5 36.3 - 47.1 %    MCV 92.4 82.6 - 102.9 fL    MCH 30.8 25.2 - 33.5 pg    MCHC 33.3 28.4 - 34.8 g/dL    RDW 13.2 11.8 - 14.4 %    Platelets 781 664 - 400 k/uL    MPV 9.5 8.1 - 13.5 fL    NRBC Automated 0.0 0.0 per 100 WBC    Seg Neutrophils 65 36 - 66 %    Lymphocytes 15 (L) 24 - 44 %    Monocytes 8 (H) 1 - 7 %    Eosinophils % 12 (H) 1 - 4 %    Basophils 0 %    Immature Granulocytes 0 0 %    Segs Absolute 2.34 1.8 - 7.7 k/uL    Absolute Lymph # 0.54 (L) 1.0 - 4.8 k/uL    Absolute Mono # 0.29 0.2 - 0.8 k/uL    Absolute Eos # 0.43 (H) 0.0 - 0.4 k/uL    Basophils Absolute 0.00 0.0 - 0.2 k/uL    Absolute Immature Granulocyte 0.00 0.00 - 0.30 k/uL   Sedimentation Rate    Collection Time: 04/04/23  7:50 AM   Result Value Ref Range    Sed Rate 5 0 - 20 mm/Hr   C-Reactive Protein    Collection Time: 04/04/23  7:50 AM   Result Value Ref Range    CRP <3.0 0.0 - 5.0 mg/L   CK    Collection Time: 04/04/23  7:50 AM   Result Value Ref Range    Total  26 - 192 U/L   Myoglobin, Blood    Collection Time: 04/04/23  7:50 AM   Result Value Ref Range    Myoglobin 39 25 - 58 ng/mL       Imaging/Diagnostics:  No results found.     Assessment :      Hospital Problems             Last Modified POA    * (Principal) Exacerbation of multiple sclerosis ([address] 4/4/2023 Yes    Multiple sclerosis ([facility] 4/4/2023 Yes    Hypothyroidism 4/4/2023 Yes    Class 3 severe obesity with body mass index (BMI) of 40.0 to 44.9 in [facility]) 4/4/2023 Yes    Pain in both upper extremities 4/4/2023 Yes    Restless leg 4/4/2023 Yes       Plan:     Patient status inpatient in the

## 2023-04-04 NOTE — ED NOTES
ED to inpatient nurses report     Chief Complaint   Patient presents with    Fatigue    Generalized Body Aches      Present to ED from home for MS flare, body pain, and fatigue. Pt. Is being managed for this by PCP, but pt. Feels that she needs admission and IV steroids.    LOC: alert and orientated to name, place, date  Vital signs   Vitals:    04/04/23 0703   BP: 127/80   Pulse: 99   Resp: 18   Temp: 97.9 °F (36.6 °C)   SpO2: 99%   Weight: 256 lb (116.1 kg)   Height: 5' 7\" (1.702 m)      Oxygen Baseline     Current needs required    SEPSIS:   [] Lactate X 2 ordered (Yes or No)  [] Antibiotics given (Yes or No)  [] IV Fluids ordered (Yes or No)             [] 2nd IV completed (Yes or No)  [] Hourly Vital Signs (Validated)  [] Outstanding Orders:     LDAs:   Peripheral IV 04/04/23 Right Forearm (Active)   Site Assessment Clean, dry & intact 04/04/23 0753   Line Status Blood return noted 04/04/23 0753     Mobility: Independent  Fall Risk:    Pending ED orders:   Present condition:   Code Status:   Consults: IP CONSULT TO HOSPITALIST  IP CONSULT TO NEUROLOGY  []  Hospitalist  Completed  [] yes [] no Who:   []  Medicine  Completed  [] yes [] No Who:   []  Cardiology  Completed  [] yes [] No Who:   []  GI   Completed  [] yes [] No Who:   []  Neurology  Completed  [] yes [] No Who:   []  Nephrology Completed  [] yes [] No Who:    []  Vascular  Completed  [] yes [] No Who:   []  Ortho  Completed  [] yes [] No Who:     []  Surgery  Completed  [] yes [] No Who:    []  Urology  Completed  [] yes [] No Who:    []  CT Surgery Completed  [] yes [] No Who:   []  Podiatry  Completed  [] yes [] No Who:    []  Other    Completed  [] yes [] No Who:  Interventions: Solumedrol, MS, Zofran  Important Events:         Electronically signed by Kevyn Villegas RN on 4/4/2023 at 9:40 AM       Baldo Blandon RN  04/04/23 7408

## 2023-04-05 VITALS
HEART RATE: 85 BPM | TEMPERATURE: 97.7 F | WEIGHT: 256 LBS | OXYGEN SATURATION: 95 % | HEIGHT: 67 IN | BODY MASS INDEX: 40.18 KG/M2 | DIASTOLIC BLOOD PRESSURE: 74 MMHG | RESPIRATION RATE: 17 BRPM | SYSTOLIC BLOOD PRESSURE: 120 MMHG

## 2023-04-05 LAB
ANION GAP SERPL CALCULATED.3IONS-SCNC: 16 MMOL/L (ref 9–17)
BUN SERPL-MCNC: 11 MG/DL (ref 6–20)
BUN/CREAT BLD: 15 (ref 9–20)
CALCIUM SERPL-MCNC: 9.2 MG/DL (ref 8.6–10.4)
CHLORIDE SERPL-SCNC: 105 MMOL/L (ref 98–107)
CO2 SERPL-SCNC: 19 MMOL/L (ref 20–31)
CREAT SERPL-MCNC: 0.73 MG/DL (ref 0.5–0.9)
GFR SERPL CREATININE-BSD FRML MDRD: >60 ML/MIN/1.73M2
GLUCOSE SERPL-MCNC: 220 MG/DL (ref 70–99)
POTASSIUM SERPL-SCNC: 3.7 MMOL/L (ref 3.7–5.3)
SODIUM SERPL-SCNC: 140 MMOL/L (ref 135–144)

## 2023-04-05 PROCEDURE — 99238 HOSP IP/OBS DSCHRG MGMT 30/<: CPT | Performed by: FAMILY MEDICINE

## 2023-04-05 PROCEDURE — 97161 PT EVAL LOW COMPLEX 20 MIN: CPT

## 2023-04-05 PROCEDURE — 6370000000 HC RX 637 (ALT 250 FOR IP): Performed by: NURSE PRACTITIONER

## 2023-04-05 PROCEDURE — 80048 BASIC METABOLIC PNL TOTAL CA: CPT

## 2023-04-05 PROCEDURE — C9113 INJ PANTOPRAZOLE SODIUM, VIA: HCPCS | Performed by: NURSE PRACTITIONER

## 2023-04-05 PROCEDURE — 36415 COLL VENOUS BLD VENIPUNCTURE: CPT

## 2023-04-05 PROCEDURE — 97165 OT EVAL LOW COMPLEX 30 MIN: CPT

## 2023-04-05 PROCEDURE — 6360000002 HC RX W HCPCS: Performed by: NURSE PRACTITIONER

## 2023-04-05 PROCEDURE — 2580000003 HC RX 258: Performed by: NURSE PRACTITIONER

## 2023-04-05 RX ADMIN — SODIUM CHLORIDE, PRESERVATIVE FREE 40 MG: 5 INJECTION INTRAVENOUS at 09:09

## 2023-04-05 RX ADMIN — MORPHINE SULFATE 2 MG: 2 INJECTION, SOLUTION INTRAMUSCULAR; INTRAVENOUS at 02:43

## 2023-04-05 RX ADMIN — TIZANIDINE 4 MG: 4 TABLET ORAL at 09:16

## 2023-04-05 RX ADMIN — SODIUM CHLORIDE, PRESERVATIVE FREE 10 ML: 5 INJECTION INTRAVENOUS at 09:11

## 2023-04-05 RX ADMIN — ENOXAPARIN SODIUM 30 MG: 100 INJECTION SUBCUTANEOUS at 09:09

## 2023-04-05 RX ADMIN — SODIUM CHLORIDE 250 MG: 9 INJECTION, SOLUTION INTRAVENOUS at 02:47

## 2023-04-05 RX ADMIN — LEVOTHYROXINE SODIUM 25 MCG: 25 TABLET ORAL at 05:48

## 2023-04-05 RX ADMIN — VENLAFAXINE HYDROCHLORIDE 37.5 MG: 37.5 TABLET ORAL at 09:09

## 2023-04-05 RX ADMIN — MORPHINE SULFATE 2 MG: 2 INJECTION, SOLUTION INTRAMUSCULAR; INTRAVENOUS at 12:21

## 2023-04-05 ASSESSMENT — PAIN - FUNCTIONAL ASSESSMENT
PAIN_FUNCTIONAL_ASSESSMENT: PREVENTS OR INTERFERES SOME ACTIVE ACTIVITIES AND ADLS
PAIN_FUNCTIONAL_ASSESSMENT: ACTIVITIES ARE NOT PREVENTED
PAIN_FUNCTIONAL_ASSESSMENT: PREVENTS OR INTERFERES SOME ACTIVE ACTIVITIES AND ADLS

## 2023-04-05 ASSESSMENT — ENCOUNTER SYMPTOMS
CHEST TIGHTNESS: 0
RHINORRHEA: 0
DIARRHEA: 0
CONSTIPATION: 0
ABDOMINAL PAIN: 0
COUGH: 0
WHEEZING: 0
SHORTNESS OF BREATH: 0
VOMITING: 0
BLOOD IN STOOL: 0
NAUSEA: 0

## 2023-04-05 ASSESSMENT — PAIN DESCRIPTION - FREQUENCY
FREQUENCY: CONTINUOUS

## 2023-04-05 ASSESSMENT — PAIN DESCRIPTION - DESCRIPTORS
DESCRIPTORS: ACHING;THROBBING
DESCRIPTORS: ACHING;THROBBING
DESCRIPTORS: ACHING

## 2023-04-05 ASSESSMENT — PAIN DESCRIPTION - PAIN TYPE
TYPE: ACUTE PAIN
TYPE: ACUTE PAIN;CHRONIC PAIN
TYPE: ACUTE PAIN;CHRONIC PAIN

## 2023-04-05 ASSESSMENT — PAIN DESCRIPTION - ONSET
ONSET: ON-GOING

## 2023-04-05 ASSESSMENT — PAIN DESCRIPTION - LOCATION
LOCATION: HEAD;NECK;BACK
LOCATION: BACK;HEAD
LOCATION: BACK;HEAD

## 2023-04-05 ASSESSMENT — PAIN SCALES - GENERAL
PAINLEVEL_OUTOF10: 8
PAINLEVEL_OUTOF10: 2
PAINLEVEL_OUTOF10: 5
PAINLEVEL_OUTOF10: 7

## 2023-04-05 NOTE — DISCHARGE INSTR - ACTIVITY
Attend physical therapy as directed   Follow with physicians as directed   Take all meds as directed

## 2023-04-05 NOTE — CONSULTS
Topics Concern    Not on file   Social History Narrative    Not on file     Social Determinants of Health     Financial Resource Strain: Not on file   Food Insecurity: Not on file   Transportation Needs: Not on file   Physical Activity: Not on file   Stress: Not on file   Social Connections: Not on file   Intimate Partner Violence: Not on file   Housing Stability: Not on file       Family History:   Family History   Problem Relation Age of Onset    Heart Attack Father        REVIEW OF SYSTEMS:    A 14-system review of systems was performed with the patient, and was positive for the above findings. PHYSICAL EXAM:  VITALS:  /66   Pulse (!) 106   Temp 98.2 °F (36.8 °C) (Oral)   Resp 16   Ht 5' 7\" (1.702 m)   Wt 256 lb (116.1 kg)   LMP 10/16/2005   SpO2 96%   BMI 40.10 kg/m²     NECK:  Symmetric, no curvature, spinous processes are non-tender on palpation, paraspinous muscles are non-tender on palpation, no costal vertebral tenderness  MUSCULOSKELETAL:  There is no redness, warmth, or swelling of the joints. Full range of motion noted. Motor strength is 5 out of 5 all extremities bilaterally. Tone is normal.  NEUROLOGIC:  Awake, alert, oriented to name, place and time. Motor is 5 out of 5 bilaterally. Sensory is intact. DATA:  Radiology Review: MRI cervical spine: Shows significant broad-based disc herniation at C5-6 which does cause significant bilateral foraminal stenosis with nerve compression consistent with her symptoms. IMPRESSION    1. C5-6 cervical spondylosis with severe foraminal stenosis and radiculopathy    Recommendations  At this point this is a relatively new issue, I do recommend a course of conservative care to include outpatient physical therapy for her neck including traction to see if this helps her radicular symptoms. She will need some medications to include anti-inflammatory, muscle relaxers, and something for pain like tramadol or Tylenol No. 3's.   She does not
and  subcortical white matter. No associated contrast enhancement or restricted  diffusion. No mass effect or midline shift. No evidence of an acute  intracranial hemorrhage. The ventricles and sulci are normal in size and  configuration. The sellar/suprasellar regions appear unremarkable. The  normal signal voids within the major intracranial vessels appear maintained. No abnormal focus of enhancement is seen within the brain. ORBITS: The visualized portion of the orbits demonstrate no acute abnormality. SINUSES: The visualized paranasal sinuses and mastoid air cells demonstrate  no acute abnormality. BONES/SOFT TISSUES: The bone marrow signal intensity appears normal. The soft  tissues demonstrate no acute abnormality. Impression  1. No acute intracranial abnormality. No acute infarct. 2. Scattered nonspecific T2 FLAIR hyperintense foci within the supratentorial  white matter. No associated contrast enhancement or restricted diffusion. Results for orders placed during the hospital encounter of 12/21/21    MRI CERVICAL SPINE W WO CONTRAST    Narrative  EXAMINATION:  MRI OF THE CERVICAL SPINE WITHOUT AND WITH CONTRAST  12/23/2021 7:56 am:    TECHNIQUE:  Multiplanar multisequence MRI of the cervical spine was performed without and  with the administration of intravenous contrast.    COMPARISON:  None. HISTORY:  ORDERING SYSTEM PROVIDED HISTORY: MS  TECHNOLOGIST PROVIDED HISTORY:  MS  Is the patient pregnant?->No  Reason for Exam: Pt to ER 12/21/21 for fatigue, and headache due to MS  exacerbation. Initial evaluation. FINDINGS:  BONES/ALIGNMENT: The vertebral body heights appear maintained. There  straightening of the normal cervical lordosis. No evidence of  spondylolisthesis. The bone marrow signal demonstrates no acute abnormality. SPINAL CORD: No abnormal cord signal or enhancement. SOFT TISSUES: No paraspinal mass identified.     C2-C3: There appears to be a disc bulge

## 2023-04-05 NOTE — CARE COORDINATION
Case Management Initial Discharge Plan  Soco Chowdary,             Met with:patient to discuss discharge plans. Information verified: address, contacts, phone number, , insurance Yes  PCP: QIAN Dang CNP  Date of last visit:     Insurance Provider: Juan Encinas    Discharge Planning    Living Arrangements:  Spouse/Significant Other, Parent   Support Systems:  Spouse/Significant Other, Family Members    Home has 1 stories  2 stairs to climb to get into front door, 0 stairs to climb to reach second floor  Location of bedroom/bathroom in home  - main floor    Patient able to perform ADL's:Independent    Current Services (outpatient & in home) none  DME equipment: none  DME provider: N/A    Ph[facility]acy: [facility]    Potential Assistance Needed:  OP therapy     Patient agreeable to home care: No  [place] of choice provided:  n/a    Prior SNF/Rehab Placement and Facility: No  Agreeable to SNF/Rehab: No  [place] of choice provided: n/a   Evaluation: n/a    Expected Discharge date:     Patient expects to be discharged to:   home  Follow Up Appointment: Best Day/ Time:      Transportation provider: drove self  Transportation arrangements needed for discharge: No    Readmission Risk              Risk of Unplanned Readmission:  8             Does patient have a readmission risk score greater than 14?: No  If yes, follow-up appointment must be made within 7 days of discharge. Goal of Care:       Discharge Plan: Met with patient at bedside. Lives with boyfriend and mom, independent and works full time. Admitted for flare up MS - neck and arm pain. Follows with [facility] PA with Dr. Aman Hutchins. Dr. Shilpi Fregoso consulted. Plan is for pt to call Dr. Lucie Parks office to schedule OP therapy and will follow pt in office after 3-4 weeks of therapy. Declines home needs.            Electronically signed by Eunice Tsang RN on 23 at 11:31 AM EDT

## 2023-04-06 NOTE — PLAN OF CARE
Problem: Discharge Planning  Goal: Discharge to home or other facility with appropriate resources  4/5/2023 2010 by Rose Mary Cat RN  Outcome: Adequate for Discharge     Problem: Pain  Goal: Verbalizes/displays adequate comfort level or baseline comfort level  4/5/2023 2010 by Rose Mary Cat RN  Outcome: Adequate for Discharge     Problem: Safety - Adult  Goal: Free from fall injury  4/5/2023 2010 by Rose Mary Cat RN  Outcome: Adequate for Discharge     Problem: Neurosensory - Adult  Goal: Achieves stable or improved neurological status  4/5/2023 2010 by Rose Mary Cat RN  Outcome: Adequate for Discharge     Problem: Neurosensory - Adult  Goal: Achieves maximal functionality and self care  4/5/2023 2010 by Rose Mary Cat RN  Outcome: Adequate for Discharge     Problem: Musculoskeletal - Adult  Goal: Return mobility to safest level of function  4/5/2023 2010 by Rose Mary Cat RN  Outcome: Adequate for Discharge
Problem: Discharge Planning  Goal: Discharge to home or other facility with appropriate resources  Outcome: Progressing     Problem: Pain  Goal: Verbalizes/displays adequate comfort level or baseline comfort level  Outcome: Progressing     Problem: Safety - Adult  Goal: Free from fall injury  Outcome: Progressing
Problem: Discharge Planning  Goal: Discharge to home or other facility with appropriate resources  Outcome: Progressing  Flowsheets  Taken 4/4/2023 1135  Discharge to home or other facility with appropriate resources:   Identify barriers to discharge with patient and caregiver   Arrange for needed discharge resources and transportation as appropriate   Identify discharge learning needs (meds, wound care, etc)  Taken 4/4/2023 1035  Discharge to home or other facility with appropriate resources:   Identify barriers to discharge with patient and caregiver   Identify discharge learning needs (meds, wound care, etc)     Problem: Pain  Goal: Verbalizes/displays adequate comfort level or baseline comfort level  Outcome: Progressing     Problem: Safety - Adult  Goal: Free from fall injury  Outcome: Progressing
self care with guidance from physical therapy/occupational therapy     Problem: Musculoskeletal - Adult  Goal: Return mobility to safest level of function  Outcome: Progressing  Flowsheets (Taken 4/5/2023 1250)  Return Mobility to Safest Level of Function:   Assess patient stability and activity tolerance for standing, transferring and ambulating with or without assistive devices   Assist with transfers and ambulation using safe patient handling equipment as needed   Obtain physical therapy/occupational therapy consults as needed   Instruct patient/family in ordered activity level

## 2023-04-06 NOTE — DISCHARGE SUMMARY
[facility]  Office: [phone]  Arslan Broderick Blood DO, Whitney Allen MD, Luis Angel Bay MD, Nguyen Choudhary MD, Helder Griffith MD, Felicitas Hernandez MD, Hector Messer MD, Keanu Laureano MD, Jenifer Villasenor MD, Latoya Castillo MD, Lulú Herring DO, Sherri Bright MD, John Padilla MD, Leonor Reese DO, Greg Simmons MD,  Stacy Sosa DO, Mauricio Baron MD, Lisa King MD, Anabella Rogel CNP, [facility] CNP, Cheyenne Zarate CNP, Chitohali Naqvi CNS, Miguel Thomas CNP, Mariam Moise CNP, Louise Barcenas CNP, Chanell Cabral CNP, Kylah Slaughter CNP, Malika Hsu PA-C, Santiago Small CNP, Beckie Sims CNP, [facility] CNP, Morteza Hayes CNP, Mo Trent CNP, Virgil Peters [facility]      Discharge Summary     Patient ID: Jessica Raza  :  1975   MRN: [id number]     ACCOUNT:  [de-identified]   Patient Location :   Patient's PCP: QIAN Naidu CNP  Admit Date: 2023   Discharge Date: 2023     Length of Stay: 1  Code Status:  Prior  Admitting Physician: Nguyen Choudhary MD  Discharge Physician: Nguyen Choudhary MD     Active Discharge Diagnosis :     Primary Problem  Exacerbation of multiple sclerosis [facility])      [facility] Problems    Diagnosis Date Noted    Exacerbation of [facility] ([facility] Albaroe Yamil 2023    Pain in both upper extremities [M79.601, M79.602] 2023    Multiple sclerosis (HCC) [G35]     Hypothyroidism [E03.9]     Class 3 severe obesity with body mass index (BMI) of 40.0 to 44.9 in adult [facility]) [E66.01, Z68.41]     Restless leg [G25.81] 2017       Admission Condition:  fair     Discharged Condition: stable    Hospital Stay:     Hospital Course:  Jessica Raza is a 56 y.o. female who was admitted for the management of   Exacerbation of multiple sclerosis ([facility] , presented to ER with Fatigue and Generalized Body Aches  Patient presented to

## 2023-04-11 NOTE — PROGRESS NOTES
Patient admitted to room 2025, oriented to room, call light and bedside table in reach. Gait steady. No needs voiced at this time.
Patient discharged to home. Discharge instructions given and explained to patient, signature obtained. All patient's belongings packed and taken with patient at time of discharge. Patient stable for discharge.
Physician Progress Note      Cherelle Vizcarra  CSN #:                  [id number]  :                       1975  ADMIT DATE:       2023 7:03 AM  DISCH DATE:        2023 5:45 PM  RESPONDING  PROVIDER #:        Chelita Rolle MD          QUERY TEXT:    Patient admitted with generalized body aches. there is conflicting   documentation whether MS is in exacerbation or not. If possible, please document in progress note: The medical record reflects the following:  Risk Factors: MS  Clinical Indicators: Patient admitted with Generalized Body Aches. H&P states   \"Exacerbation of multiple sclerosis with pain in bilateral upper extremities\". Consult note states \"Recent MRI's ruled out an MS exacerbation/ new enhancing   lesions but did show DJD of the spine\". Discharge summary states \" Patient   contacted neurologist and was directed to go to ER for possible MS   exacerbation requiring IV steroids\"  Treatment: neuro consult , IV Solumedrol  Options provided:  -- MS exacerbation confirmed  -- MS exacerbation ruled out  -- Other - I will add my own diagnosis  -- Disagree - Not applicable / Not valid  -- Disagree - Clinically unable to determine / Unknown  -- Refer to Clinical Documentation Reviewer    PROVIDER RESPONSE TEXT:    After study, MS exacerbation ruled out. Query created by: Hillary Velez on 4/10/2023 8:06 AM      Electronically signed by:   Chelita Rolle MD 2023 1:21 PM
to this visit. Past Medical History:  has a past medical history of Balance problem, COVID-19 virus infection, Dizziness, History of depression, Hypothyroidism, Migraines, Multiple sclerosis ([address] Restless leg syndrome, and Sleep apnea. Past Surgical History:  has a past surgical history that includes Hysterectomy; Cholecystectomy; Varicose vein surgery (2001); Carpal tunnel release (Left, 10-); Carpal tunnel release (Right, 10-); Thoracic spine surgery; back surgery (08/23/2021); and laminectomy (N/A, 8/23/2021). Assessment   Performance deficits / Impairments: Decreased sensation  Assessment: OT evaluation completed this session, w/ pt able to complete all functional mobility and ADL tasks assessed w/ independence. Pt functioning at baseline level, states her pain has been well controlled since hospital admission. Per Dr. Evans Augustin, plan is to recieve OP PT to C5-6 cervical spondylosis with severe foraminal stenosis and radiculopathy. Pt w/ no acute care OT needs at this time. Prognosis: Good  Decision Making: Low Complexity  REQUIRES OT FOLLOW-UP: No  Activity Tolerance  Activity Tolerance: Patient Tolerated treatment well        Plan   Occupational Therapy Plan  Times Per Week: Evaluation only     Restrictions  Restrictions/Precautions  Restrictions/Precautions: Up as Tolerated  Required Braces or Orthoses?: No  Position Activity Restriction  Other position/activity restrictions: RUE IV    Subjective   General  Chart Reviewed: Yes  Patient assessed for rehabilitation services?: Yes  Family / Caregiver Present: No  Subjective  Subjective: Pt seated upright in bed upon entry to room, pleasant and cooperative w/ therapy evaluation.  Pt states her pain in her UEs has been better controlled since admission to hospital.     Social/Functional History  Social/Functional History  Lives With: Significant other  Type of Home: House  Home Layout: One level  Home Access: Stairs to enter without
Independent  Sit to Supine: Independent  Transfers  Sit to Stand: Independent  Stand to Sit: Independent  Ambulation  Surface: Level tile  Device: No Device  Assistance: Independent  Gait Deviations: None  Distance: 50 feet     Balance  Posture: Good  Sitting - Static: Good  Sitting - Dynamic: Good  Standing - Static: Good  Standing - Dynamic: Good  Single Leg Stance R Le  Single Leg Stance L Le             AM-PAC Score  AM-PAC Inpatient Mobility Raw Score : 24 (23 114)  AM-PAC Inpatient T-Scale Score : 61.14 (23)  Mobility Inpatient CMS 0-100% Score: 0 (23)  Mobility Inpatient CMS G-Code Modifier : CH (23)                           Education   Role of Therapy; Plan of Care      Therapy Time   Individual Concurrent Group Co-treatment   Time In [address] [place] [address]         Time Out 0837         Minutes 18                 Esthela Saez, PT
General: No scleral icterus. Conjunctiva/sclera: Conjunctivae normal.      Pupils: Pupils are equal, round, and reactive to light. Neck:      Thyroid: No thyromegaly. Vascular: No JVD. Cardiovascular:      Rate and Rhythm: Normal rate and regular rhythm. Pulses:           Dorsalis pedis pulses are 2+ on the right side and 2+ on the left side. Heart sounds: Normal heart sounds. No murmur heard. Pulmonary:      Effort: Pulmonary effort is normal.      Breath sounds: Normal breath sounds. No wheezing or rales. Abdominal:      Palpations: Abdomen is soft. There is no mass. Tenderness: There is no abdominal tenderness. Musculoskeletal:      Cervical back: Full passive range of motion without pain and neck supple. Lymphadenopathy:      Head:      Right side of head: No submandibular adenopathy. Left side of head: No submandibular adenopathy. Cervical: No cervical adenopathy. Skin:     General: Skin is warm. Neurological:      Mental Status: She is alert and oriented to person, place, and time. Motor: No tremor. Psychiatric:         Behavior: Behavior is cooperative.          Laboratory findings:    Recent Labs     04/04/23  0750   WBC 3.6   HGB 14.5   HCT 43.5      SEDRATE 5     Recent Labs     04/04/23  0750 04/05/23  0617    140   K 3.6* 3.7   * 105   CO2 21 19*   GLUCOSE 120* 220*   BUN 17 11   CREATININE 0.63 0.73   CALCIUM 8.5* 9.2     Recent Labs     04/04/23  0750   CKTOTAL 121          Specific Gravity, UA   Date Value Ref Range Status   06/20/2022 1.020 1.005 - 1.030 Final     Protein, UA   Date Value Ref Range Status   06/20/2022 NEGATIVE NEGATIVE Final     RBC, UA   Date Value Ref Range Status   06/20/2022 0 TO 2 0 - 2 /HPF Final     Bacteria, UA   Date Value Ref Range Status   08/02/2021 RARE (A) None Final     Nitrite, Urine   Date Value Ref Range Status   06/20/2022 NEGATIVE NEGATIVE Final     WBC, UA   Date Value Ref Range Status

## 2023-05-01 ENCOUNTER — HOSPITAL ENCOUNTER (OUTPATIENT)
Dept: PHYSICAL THERAPY | Facility: CLINIC | Age: 48
Setting detail: THERAPIES SERIES
Discharge: HOME | End: 2023-05-01

## 2023-05-01 NOTE — FLOWSHEET NOTE
[] [facility]) [facility]  Therapy  [address]    [phone]  F: [phone]   [x] [address]  [facility]   [address]  P: [phone]  F: [phone]  [] [address]  [address]  P: [phone]  F: [phone] [] [address]  P: [phone]  F: [phone]  [] [address]  [address] [facility] [address]   [address]   [place]: [phone]  F: [phone]   [] [place] [address] [facility] [address]  [place]: [phone]   F: [phone]     Physical Therapy Cancel/No Show note    Date: 2023  Patient: Shannan Banerjee  : 1975  MRN: [id number]    Cancels/No Shows to date:  (eval)    For today's appointment patient:    [x]  Cancelled    [x] Rescheduled appointment    [] No-show     Reason given by patient:    []  Patient ill    []  Conflicting appointment    [] No transportation      [] Conflict with work    [x] No reason given    [] Weather related    [] COVID-19    [] Other:      Comments:        [x] Next appointment was confirmed    Electronically signed by: Carey Jones PT

## 2023-05-12 ENCOUNTER — HOSPITAL ENCOUNTER (OUTPATIENT)
Dept: PHYSICAL THERAPY | Facility: CLINIC | Age: 48
Setting detail: THERAPIES SERIES
Discharge: HOME | End: 2023-05-12

## 2023-08-02 ENCOUNTER — HOSPITAL ENCOUNTER (OUTPATIENT)
Dept: PREADMISSION TESTING | Age: 48
Discharge: HOME | End: 2023-08-02
Payer: COMMERCIAL

## 2023-08-02 VITALS
BODY MASS INDEX: 40.19 KG/M2 | OXYGEN SATURATION: 97 % | DIASTOLIC BLOOD PRESSURE: 88 MMHG | WEIGHT: 256.1 LBS | TEMPERATURE: 96.9 F | SYSTOLIC BLOOD PRESSURE: 142 MMHG | RESPIRATION RATE: 16 BRPM | HEIGHT: 67 IN

## 2023-08-02 LAB
ANION GAP SERPL CALCULATED.3IONS-SCNC: 11 MMOL/L (ref 9–17)
BACTERIA URNS QL MICRO: ABNORMAL
BASOPHILS # BLD: 0.06 K/UL (ref 0–0.2)
BASOPHILS NFR BLD: 1 % (ref 0–2)
BILIRUB UR QL STRIP: NEGATIVE
BUN SERPL-MCNC: 13 MG/DL (ref 6–20)
BUN/CREAT SERPL: 19 (ref 9–20)
CALCIUM SERPL-MCNC: 9.2 MG/DL (ref 8.6–10.4)
CHLORIDE SERPL-SCNC: 108 MMOL/L (ref 98–107)
CLARITY UR: ABNORMAL
CO2 SERPL-SCNC: 22 MMOL/L (ref 20–31)
COLOR UR: YELLOW
CREAT SERPL-MCNC: 0.7 MG/DL (ref 0.5–0.9)
EOSINOPHIL # BLD: 0.29 K/UL (ref 0–0.44)
EOSINOPHILS RELATIVE PERCENT: 5 % (ref 1–4)
EPI CELLS #/AREA URNS HPF: ABNORMAL /HPF (ref 0–5)
ERYTHROCYTE [DISTWIDTH] IN BLOOD BY AUTOMATED COUNT: 13.5 % (ref 11.8–14.4)
GFR SERPL CREATININE-BSD FRML MDRD: >60 ML/MIN/1.73M2
GLUCOSE SERPL-MCNC: 124 MG/DL (ref 70–99)
GLUCOSE UR STRIP-MCNC: NEGATIVE MG/DL
HCT VFR BLD AUTO: 46.6 % (ref 36.3–47.1)
HGB BLD-MCNC: 15.7 G/DL (ref 11.9–15.1)
HGB UR QL STRIP.AUTO: ABNORMAL
IMM GRANULOCYTES # BLD AUTO: 0.06 K/UL (ref 0–0.3)
IMM GRANULOCYTES NFR BLD: 1 %
INR PPP: 1
KETONES UR STRIP-MCNC: NEGATIVE MG/DL
LEUKOCYTE ESTERASE UR QL STRIP: ABNORMAL
LYMPHOCYTES NFR BLD: 0.52 K/UL (ref 1.1–3.7)
LYMPHOCYTES RELATIVE PERCENT: 9 % (ref 24–43)
MCH RBC QN AUTO: 31.5 PG (ref 25.2–33.5)
MCHC RBC AUTO-ENTMCNC: 33.7 G/DL (ref 28.4–34.8)
MCV RBC AUTO: 93.6 FL (ref 82.6–102.9)
MONOCYTES NFR BLD: 0.64 K/UL (ref 0.1–1.2)
MONOCYTES NFR BLD: 11 % (ref 3–12)
NEUTROPHILS NFR BLD: 73 % (ref 36–65)
NEUTS SEG NFR BLD: 4.23 K/UL (ref 1.5–8.1)
NITRITE UR QL STRIP: NEGATIVE
NRBC BLD-RTO: 0 PER 100 WBC
PARTIAL THROMBOPLASTIN TIME: 23.8 SEC (ref 23.9–33.8)
PH UR STRIP: 6 [PH] (ref 5–8)
PLATELET # BLD AUTO: 262 K/UL (ref 138–453)
PMV BLD AUTO: 9.4 FL (ref 8.1–13.5)
POTASSIUM SERPL-SCNC: 3.5 MMOL/L (ref 3.7–5.3)
PROT UR STRIP-MCNC: NEGATIVE MG/DL
PROTHROMBIN TIME: 12.7 SEC (ref 11.5–14.2)
RBC # BLD AUTO: 4.98 M/UL (ref 3.95–5.11)
RBC #/AREA URNS HPF: ABNORMAL /HPF (ref 0–2)
SODIUM SERPL-SCNC: 141 MMOL/L (ref 135–144)
SP GR UR STRIP: 1.02 (ref 1–1.03)
UROBILINOGEN UR STRIP-ACNC: NORMAL EU/DL (ref 0–1)
WBC #/AREA URNS HPF: ABNORMAL /HPF (ref 0–5)
WBC OTHER # BLD: 5.8 K/UL (ref 3.5–11.3)

## 2023-08-02 PROCEDURE — 85730 THROMBOPLASTIN TIME PARTIAL: CPT

## 2023-08-02 PROCEDURE — 85025 COMPLETE CBC W/AUTO DIFF WBC: CPT

## 2023-08-02 PROCEDURE — 81001 URINALYSIS AUTO W/SCOPE: CPT

## 2023-08-02 PROCEDURE — 85610 PROTHROMBIN TIME: CPT

## 2023-08-02 PROCEDURE — 36415 COLL VENOUS BLD VENIPUNCTURE: CPT

## 2023-08-02 PROCEDURE — 80048 BASIC METABOLIC PNL TOTAL CA: CPT

## 2023-08-02 PROCEDURE — 93005 ELECTROCARDIOGRAM TRACING: CPT | Performed by: ORTHOPAEDIC SURGERY

## 2023-08-02 PROCEDURE — 87086 URINE CULTURE/COLONY COUNT: CPT

## 2023-08-02 RX ORDER — CLINDAMYCIN PHOSPHATE 900 MG/50ML
900 INJECTION INTRAVENOUS ONCE
OUTPATIENT
Start: 2023-08-23

## 2023-08-02 RX ORDER — IBUPROFEN 800 MG/1
800 TABLET ORAL EVERY 6 HOURS PRN
COMMUNITY

## 2023-08-02 RX ORDER — DIHYDROERGOTAMINE MESYLATE 4 MG/ML
SPRAY, METERED NASAL
COMMUNITY

## 2023-08-02 RX ORDER — DEXTROAMPHETAMINE SACCHARATE, AMPHETAMINE ASPARTATE, DEXTROAMPHETAMINE SULFATE AND AMPHETAMINE SULFATE 2.5; 2.5; 2.5; 2.5 MG/1; MG/1; MG/1; MG/1
10 TABLET ORAL DAILY PRN
COMMUNITY

## 2023-08-02 RX ORDER — ACETAMINOPHEN AND CODEINE PHOSPHATE 300; 30 MG/1; MG/1
1 TABLET ORAL EVERY 4 HOURS PRN
COMMUNITY

## 2023-08-02 RX ORDER — ETODOLAC 400 MG/1
400 TABLET, FILM COATED ORAL 2 TIMES DAILY
COMMUNITY

## 2023-08-02 RX ORDER — DEXTROAMPHETAMINE SACCHARATE, AMPHETAMINE ASPARTATE, DEXTROAMPHETAMINE SULFATE AND AMPHETAMINE SULFATE 7.5; 7.5; 7.5; 7.5 MG/1; MG/1; MG/1; MG/1
30 TABLET ORAL DAILY
COMMUNITY

## 2023-08-02 ASSESSMENT — PAIN DESCRIPTION - LOCATION: LOCATION: ARM

## 2023-08-02 ASSESSMENT — PAIN SCALES - GENERAL: PAINLEVEL_OUTOF10: 3

## 2023-08-02 ASSESSMENT — PAIN DESCRIPTION - ORIENTATION: ORIENTATION: LEFT;RIGHT

## 2023-08-02 ASSESSMENT — PAIN DESCRIPTION - DESCRIPTORS: DESCRIPTORS: ACHING

## 2023-08-02 NOTE — PRE-PROCEDURE INSTRUCTIONS
On the Day of Your Surgery, Wednesday, 8/23, Please Arrive At 0545 AM     Enter the hospital through the Main Entrance, take the lobby elevators to the second floor and check in at the Surgery Registration desk. Continue to take your home medications as you normally do up to and including the night before surgery with the exception of blood thinning medications. Blood Thinning Medications:  Please stop prescription blood thinning medications such as Apixaban (Eliquis); Clopidogrel (Plavix); Dabigatran (Pradaxa); Prasugrel (Effient); Rivaroxaban (Xarelto); Ticagrelor (Brilinta); Warfarin (Coumadin) only as directed by your surgeon and/or the prescribing physician    Some common examples of other medications that can thin your blood are: Aspirin, Ibuprofen (Advil, Motrin), Naproxen (Aleve), Meloxicam (Mobic), Celecoxib (Celebrex), Fish Oil, many Herbal Supplements. These medications should usually be stopped at least 7 days prior to surgery. Ibuprofen, etodolac,     Tylenol is OK to take for pain the week prior to surgery. Failure to stop certain medications may interfere with your scheduled surgery. If you receive instructions from your surgeon regarding what medications to stop prior to surgery, please follow those specific instructions. Please take the following medication(s) the day of surgery with small sips of water:              Levothyroxine, venlafexine, topiramate, tylenol with codiene    Showering Before Surgery: You can play an important role in your own health by carefully washing before surgery. Shower the night before and the morning of surgery using the instructions below. If you are allergic to Chlorhexidine Gluconate (CHG) use antibacterial soap instead. If you were given Chlorhexidine soap, please follow the instructions included with the soap to shower the night before and the morning of surgery.   If you were not given Chlorhexidine, please shower or bathe the morning of may also bring your hearing aids. If you have dentures, most surgical procedures involving anesthesia will require that you remove them prior to surgery. 4. If you sleep with a CPAP or BiPAP machine at home and plan on staying in the hospital overnight after surgery, please bring your machine with you. 5. Do not wear any jewelry or body piercings the day of surgery. No nail polish on the operative extremity (arm/hand or leg/foot surgeries)   6. If you are staying overnight with us, you may bring a small bag of necessary personal items. 7. Please wear loose, comfortable clothing. If you are potentially going to have a cast, sling, brace or bulky dressing, make sure to wear clothing that will fit over it. 8. In case of illness - If you have cold or flu like symptoms (high fever, runny nose, sore throat, cough, etc.) rash, nausea, vomiting, loose stools, and/or recent contact with someone who has a contagious disease (chicken pox, measles, COVID-19, etc.). Please call your surgeon before coming to the hospital.    Transportation After Your Surgery/Procedure: If you are going home the same day of surgery you need someone to drive you home. Your  must be at least 25years of age. A taxi cab or other nonmedical public transportation is not acceptable unless you have someone to ride home in the vehicle with you. For your safety, someone must remain with you for the first 24 hours after your surgery if you receive anesthesia or medication. If you do not have someone to stay with you, your procedure may be cancelled. As a patient at [facility] you can expect quality medical and nursing care that is centered on you individual needs. Our goal is to make your surgical experience as comfortable as possible.     Any questions about preparing for your surgery please call [phone].      ____________________________   ____________________________  Signature (Patient)

## 2023-08-03 LAB
EKG ATRIAL RATE: 91 BPM
EKG P AXIS: 28 DEGREES
EKG P-R INTERVAL: 154 MS
EKG Q-T INTERVAL: 354 MS
EKG QRS DURATION: 84 MS
EKG QTC CALCULATION (BAZETT): 435 MS
EKG R AXIS: 20 DEGREES
EKG T AXIS: 26 DEGREES
EKG VENTRICULAR RATE: 91 BPM
MICROORGANISM SPEC CULT: NORMAL
SPECIMEN DESCRIPTION: NORMAL

## 2023-08-23 ENCOUNTER — ANESTHESIA EVENT (OUTPATIENT)
Dept: OPERATING ROOM | Age: 48
End: 2023-08-23
Payer: COMMERCIAL

## 2023-08-23 ENCOUNTER — APPOINTMENT (OUTPATIENT)
Dept: GENERAL RADIOLOGY | Age: 48
End: 2023-08-23
Attending: ORTHOPAEDIC SURGERY
Payer: COMMERCIAL

## 2023-08-23 ENCOUNTER — HOSPITAL ENCOUNTER (OUTPATIENT)
Age: 48
Discharge: HOME | End: 2023-08-24
Attending: ORTHOPAEDIC SURGERY | Admitting: ORTHOPAEDIC SURGERY
Payer: COMMERCIAL

## 2023-08-23 ENCOUNTER — ANESTHESIA (OUTPATIENT)
Dept: OPERATING ROOM | Age: 48
End: 2023-08-23
Payer: COMMERCIAL

## 2023-08-23 DIAGNOSIS — M50.10 HERNIATION OF CERVICAL INTERVERTEBRAL DISC WITH RADICULOPATHY: Primary | Chronic | ICD-10-CM

## 2023-08-23 PROCEDURE — 3700000000 HC ANESTHESIA ATTENDED CARE: Performed by: ORTHOPAEDIC SURGERY

## 2023-08-23 PROCEDURE — 87641 MR-STAPH DNA AMP PROBE: CPT

## 2023-08-23 PROCEDURE — 7100000000 HC PACU RECOVERY - FIRST 15 MIN: Performed by: ORTHOPAEDIC SURGERY

## 2023-08-23 PROCEDURE — 7100000001 HC PACU RECOVERY - ADDTL 15 MIN: Performed by: ORTHOPAEDIC SURGERY

## 2023-08-23 PROCEDURE — 97161 PT EVAL LOW COMPLEX 20 MIN: CPT

## 2023-08-23 PROCEDURE — C9399 UNCLASSIFIED DRUGS OR BIOLOG: HCPCS | Performed by: SPECIALIST

## 2023-08-23 PROCEDURE — 2709999900 HC NON-CHARGEABLE SUPPLY: Performed by: ORTHOPAEDIC SURGERY

## 2023-08-23 PROCEDURE — 6360000002 HC RX W HCPCS: Performed by: SPECIALIST

## 2023-08-23 PROCEDURE — C1889 IMPLANT/INSERT DEVICE, NOC: HCPCS | Performed by: ORTHOPAEDIC SURGERY

## 2023-08-23 PROCEDURE — 2500000003 HC RX 250 WO HCPCS: Performed by: ORTHOPAEDIC SURGERY

## 2023-08-23 PROCEDURE — 99221 1ST HOSP IP/OBS SF/LOW 40: CPT | Performed by: FAMILY MEDICINE

## 2023-08-23 PROCEDURE — 2580000003 HC RX 258: Performed by: ANESTHESIOLOGY

## 2023-08-23 PROCEDURE — 6360000002 HC RX W HCPCS: Performed by: ORTHOPAEDIC SURGERY

## 2023-08-23 PROCEDURE — A4217 STERILE WATER/SALINE, 500 ML: HCPCS | Performed by: ORTHOPAEDIC SURGERY

## 2023-08-23 PROCEDURE — 2580000003 HC RX 258: Performed by: ORTHOPAEDIC SURGERY

## 2023-08-23 PROCEDURE — 3700000001 HC ADD 15 MINUTES (ANESTHESIA): Performed by: ORTHOPAEDIC SURGERY

## 2023-08-23 PROCEDURE — 6370000000 HC RX 637 (ALT 250 FOR IP): Performed by: ORTHOPAEDIC SURGERY

## 2023-08-23 PROCEDURE — 97530 THERAPEUTIC ACTIVITIES: CPT

## 2023-08-23 PROCEDURE — 3600000004 HC SURGERY LEVEL 4 BASE: Performed by: ORTHOPAEDIC SURGERY

## 2023-08-23 PROCEDURE — 2500000003 HC RX 250 WO HCPCS: Performed by: SPECIALIST

## 2023-08-23 PROCEDURE — 2720000010 HC SURG SUPPLY STERILE: Performed by: ORTHOPAEDIC SURGERY

## 2023-08-23 PROCEDURE — 6360000002 HC RX W HCPCS: Performed by: ANESTHESIOLOGY

## 2023-08-23 PROCEDURE — 6370000000 HC RX 637 (ALT 250 FOR IP): Performed by: ANESTHESIOLOGY

## 2023-08-23 PROCEDURE — 99222 1ST HOSP IP/OBS MODERATE 55: CPT | Performed by: FAMILY MEDICINE

## 2023-08-23 PROCEDURE — C1713 ANCHOR/SCREW BN/BN,TIS/BN: HCPCS | Performed by: ORTHOPAEDIC SURGERY

## 2023-08-23 PROCEDURE — 3600000014 HC SURGERY LEVEL 4 ADDTL 15MIN: Performed by: ORTHOPAEDIC SURGERY

## 2023-08-23 DEVICE — SCREW 7713515 ZEVO VAR SD 3.5MM X 15MM
Type: IMPLANTABLE DEVICE | Status: FUNCTIONAL
Brand: ZEVO™ ANTERIOR CERVICAL PLATE SYSTEM

## 2023-08-23 DEVICE — SUBSTITUTE BONE GRAFT SM PROPEL PUTTY: Type: IMPLANTABLE DEVICE | Status: FUNCTIONAL

## 2023-08-23 DEVICE — INTERBODY FUSION DEVICE  6 DEGREE MEDIUM 8MM
Type: IMPLANTABLE DEVICE | Status: FUNCTIONAL
Brand: ENDOSKELETON® TC NANOLOCK® SURFACE TECHNOLOGY

## 2023-08-23 DEVICE — PLATE 3001019 ZEVO 19MM 1 LVL
Type: IMPLANTABLE DEVICE | Status: FUNCTIONAL
Brand: ZEVO™ ANTERIOR CERVICAL PLATE SYSTEM

## 2023-08-23 RX ORDER — SENNA AND DOCUSATE SODIUM 50; 8.6 MG/1; MG/1
1 TABLET, FILM COATED ORAL 2 TIMES DAILY
Status: DISCONTINUED | OUTPATIENT
Start: 2023-08-23 | End: 2023-08-24 | Stop reason: HOSPADM

## 2023-08-23 RX ORDER — OXYCODONE HYDROCHLORIDE 5 MG/1
5 TABLET ORAL
Status: COMPLETED | OUTPATIENT
Start: 2023-08-23 | End: 2023-08-23

## 2023-08-23 RX ORDER — SODIUM CHLORIDE 0.9 % (FLUSH) 0.9 %
5-40 SYRINGE (ML) INJECTION PRN
Status: DISCONTINUED | OUTPATIENT
Start: 2023-08-23 | End: 2023-08-24 | Stop reason: HOSPADM

## 2023-08-23 RX ORDER — LIDOCAINE HYDROCHLORIDE 10 MG/ML
1 INJECTION, SOLUTION EPIDURAL; INFILTRATION; INTRACAUDAL; PERINEURAL
Status: DISCONTINUED | OUTPATIENT
Start: 2023-08-24 | End: 2023-08-23 | Stop reason: HOSPADM

## 2023-08-23 RX ORDER — TOPIRAMATE 25 MG/1
25 TABLET ORAL 2 TIMES DAILY
Status: DISCONTINUED | OUTPATIENT
Start: 2023-08-23 | End: 2023-08-24 | Stop reason: HOSPADM

## 2023-08-23 RX ORDER — TIZANIDINE 4 MG/1
4 TABLET ORAL EVERY 8 HOURS PRN
Status: DISCONTINUED | OUTPATIENT
Start: 2023-08-23 | End: 2023-08-24 | Stop reason: HOSPADM

## 2023-08-23 RX ORDER — OXYCODONE HYDROCHLORIDE AND ACETAMINOPHEN 5; 325 MG/1; MG/1
2 TABLET ORAL EVERY 4 HOURS PRN
Status: DISCONTINUED | OUTPATIENT
Start: 2023-08-23 | End: 2023-08-24 | Stop reason: HOSPADM

## 2023-08-23 RX ORDER — SODIUM CHLORIDE 9 MG/ML
INJECTION, SOLUTION INTRAVENOUS PRN
Status: DISCONTINUED | OUTPATIENT
Start: 2023-08-23 | End: 2023-08-24 | Stop reason: HOSPADM

## 2023-08-23 RX ORDER — ONDANSETRON 2 MG/ML
4 INJECTION INTRAMUSCULAR; INTRAVENOUS EVERY 6 HOURS PRN
Status: DISCONTINUED | OUTPATIENT
Start: 2023-08-23 | End: 2023-08-24 | Stop reason: HOSPADM

## 2023-08-23 RX ORDER — ONDANSETRON 2 MG/ML
4 INJECTION INTRAMUSCULAR; INTRAVENOUS
Status: DISCONTINUED | OUTPATIENT
Start: 2023-08-23 | End: 2023-08-23 | Stop reason: HOSPADM

## 2023-08-23 RX ORDER — DEXAMETHASONE SODIUM PHOSPHATE 10 MG/ML
6 INJECTION, SOLUTION INTRAMUSCULAR; INTRAVENOUS EVERY 8 HOURS
Status: COMPLETED | OUTPATIENT
Start: 2023-08-23 | End: 2023-08-24

## 2023-08-23 RX ORDER — SODIUM CHLORIDE 9 MG/ML
INJECTION, SOLUTION INTRAVENOUS CONTINUOUS
Status: DISCONTINUED | OUTPATIENT
Start: 2023-08-23 | End: 2023-08-23

## 2023-08-23 RX ORDER — SODIUM CHLORIDE 0.9 % (FLUSH) 0.9 %
5-40 SYRINGE (ML) INJECTION EVERY 12 HOURS SCHEDULED
Status: DISCONTINUED | OUTPATIENT
Start: 2023-08-23 | End: 2023-08-24 | Stop reason: HOSPADM

## 2023-08-23 RX ORDER — MORPHINE SULFATE 4 MG/ML
4 INJECTION, SOLUTION INTRAMUSCULAR; INTRAVENOUS
Status: DISCONTINUED | OUTPATIENT
Start: 2023-08-23 | End: 2023-08-24 | Stop reason: HOSPADM

## 2023-08-23 RX ORDER — MORPHINE SULFATE 2 MG/ML
2 INJECTION, SOLUTION INTRAMUSCULAR; INTRAVENOUS
Status: DISCONTINUED | OUTPATIENT
Start: 2023-08-23 | End: 2023-08-24 | Stop reason: HOSPADM

## 2023-08-23 RX ORDER — POLYETHYLENE GLYCOL 3350 17 G/17G
17 POWDER, FOR SOLUTION ORAL DAILY
Status: DISCONTINUED | OUTPATIENT
Start: 2023-08-23 | End: 2023-08-24 | Stop reason: HOSPADM

## 2023-08-23 RX ORDER — KETAMINE HCL IN NACL, ISO-OSM 100MG/10ML
SYRINGE (ML) INJECTION PRN
Status: DISCONTINUED | OUTPATIENT
Start: 2023-08-23 | End: 2023-08-23 | Stop reason: SDUPTHER

## 2023-08-23 RX ORDER — FENTANYL CITRATE 50 UG/ML
25 INJECTION, SOLUTION INTRAMUSCULAR; INTRAVENOUS EVERY 5 MIN PRN
Status: DISCONTINUED | OUTPATIENT
Start: 2023-08-23 | End: 2023-08-23 | Stop reason: HOSPADM

## 2023-08-23 RX ORDER — SODIUM CHLORIDE, SODIUM LACTATE, POTASSIUM CHLORIDE, CALCIUM CHLORIDE 600; 310; 30; 20 MG/100ML; MG/100ML; MG/100ML; MG/100ML
INJECTION, SOLUTION INTRAVENOUS CONTINUOUS
Status: DISCONTINUED | OUTPATIENT
Start: 2023-08-23 | End: 2023-08-23

## 2023-08-23 RX ORDER — SODIUM CHLORIDE 0.9 % (FLUSH) 0.9 %
5-40 SYRINGE (ML) INJECTION EVERY 12 HOURS SCHEDULED
Status: DISCONTINUED | OUTPATIENT
Start: 2023-08-23 | End: 2023-08-23 | Stop reason: HOSPADM

## 2023-08-23 RX ORDER — SODIUM CHLORIDE 9 MG/ML
INJECTION, SOLUTION INTRAVENOUS CONTINUOUS
Status: DISCONTINUED | OUTPATIENT
Start: 2023-08-23 | End: 2023-08-24 | Stop reason: HOSPADM

## 2023-08-23 RX ORDER — HYDROXYZINE HYDROCHLORIDE 10 MG/1
10 TABLET, FILM COATED ORAL EVERY 8 HOURS PRN
Status: DISCONTINUED | OUTPATIENT
Start: 2023-08-23 | End: 2023-08-24 | Stop reason: HOSPADM

## 2023-08-23 RX ORDER — MIDAZOLAM HYDROCHLORIDE 1 MG/ML
INJECTION INTRAMUSCULAR; INTRAVENOUS PRN
Status: DISCONTINUED | OUTPATIENT
Start: 2023-08-23 | End: 2023-08-23 | Stop reason: SDUPTHER

## 2023-08-23 RX ORDER — PROMETHAZINE HYDROCHLORIDE 12.5 MG/1
12.5 TABLET ORAL EVERY 6 HOURS PRN
Status: DISCONTINUED | OUTPATIENT
Start: 2023-08-23 | End: 2023-08-24 | Stop reason: HOSPADM

## 2023-08-23 RX ORDER — DIPHENHYDRAMINE HYDROCHLORIDE 50 MG/ML
12.5 INJECTION INTRAMUSCULAR; INTRAVENOUS
Status: DISCONTINUED | OUTPATIENT
Start: 2023-08-23 | End: 2023-08-23 | Stop reason: HOSPADM

## 2023-08-23 RX ORDER — DEXAMETHASONE SODIUM PHOSPHATE 10 MG/ML
INJECTION, SOLUTION INTRAMUSCULAR; INTRAVENOUS PRN
Status: DISCONTINUED | OUTPATIENT
Start: 2023-08-23 | End: 2023-08-23 | Stop reason: SDUPTHER

## 2023-08-23 RX ORDER — DEXTROAMPHETAMINE SACCHARATE, AMPHETAMINE ASPARTATE, DEXTROAMPHETAMINE SULFATE AND AMPHETAMINE SULFATE 7.5; 7.5; 7.5; 7.5 MG/1; MG/1; MG/1; MG/1
30 TABLET ORAL DAILY
Status: DISCONTINUED | OUTPATIENT
Start: 2023-08-23 | End: 2023-08-23

## 2023-08-23 RX ORDER — PROPOFOL 10 MG/ML
INJECTION, EMULSION INTRAVENOUS PRN
Status: DISCONTINUED | OUTPATIENT
Start: 2023-08-23 | End: 2023-08-23 | Stop reason: SDUPTHER

## 2023-08-23 RX ORDER — FENTANYL CITRATE 50 UG/ML
50 INJECTION, SOLUTION INTRAMUSCULAR; INTRAVENOUS EVERY 5 MIN PRN
Status: COMPLETED | OUTPATIENT
Start: 2023-08-23 | End: 2023-08-23

## 2023-08-23 RX ORDER — SODIUM CHLORIDE 9 MG/ML
INJECTION, SOLUTION INTRAVENOUS PRN
Status: DISCONTINUED | OUTPATIENT
Start: 2023-08-23 | End: 2023-08-23 | Stop reason: HOSPADM

## 2023-08-23 RX ORDER — ROPINIROLE 1 MG/1
2 TABLET, FILM COATED ORAL 2 TIMES DAILY
Status: DISCONTINUED | OUTPATIENT
Start: 2023-08-23 | End: 2023-08-24 | Stop reason: HOSPADM

## 2023-08-23 RX ORDER — ROCURONIUM BROMIDE 10 MG/ML
INJECTION, SOLUTION INTRAVENOUS PRN
Status: DISCONTINUED | OUTPATIENT
Start: 2023-08-23 | End: 2023-08-23 | Stop reason: SDUPTHER

## 2023-08-23 RX ORDER — OXYCODONE HYDROCHLORIDE AND ACETAMINOPHEN 5; 325 MG/1; MG/1
1 TABLET ORAL EVERY 4 HOURS PRN
Status: DISCONTINUED | OUTPATIENT
Start: 2023-08-23 | End: 2023-08-24 | Stop reason: HOSPADM

## 2023-08-23 RX ORDER — SODIUM CHLORIDE 0.9 % (FLUSH) 0.9 %
5-40 SYRINGE (ML) INJECTION PRN
Status: DISCONTINUED | OUTPATIENT
Start: 2023-08-23 | End: 2023-08-23 | Stop reason: HOSPADM

## 2023-08-23 RX ORDER — ONDANSETRON 2 MG/ML
INJECTION INTRAMUSCULAR; INTRAVENOUS PRN
Status: DISCONTINUED | OUTPATIENT
Start: 2023-08-23 | End: 2023-08-23 | Stop reason: SDUPTHER

## 2023-08-23 RX ORDER — LIDOCAINE HYDROCHLORIDE 20 MG/ML
INJECTION, SOLUTION EPIDURAL; INFILTRATION; INTRACAUDAL; PERINEURAL PRN
Status: DISCONTINUED | OUTPATIENT
Start: 2023-08-23 | End: 2023-08-23 | Stop reason: SDUPTHER

## 2023-08-23 RX ORDER — VENLAFAXINE 37.5 MG/1
37.5 TABLET ORAL 2 TIMES DAILY
Status: DISCONTINUED | OUTPATIENT
Start: 2023-08-23 | End: 2023-08-24 | Stop reason: HOSPADM

## 2023-08-23 RX ORDER — FENTANYL CITRATE 50 UG/ML
INJECTION, SOLUTION INTRAMUSCULAR; INTRAVENOUS PRN
Status: DISCONTINUED | OUTPATIENT
Start: 2023-08-23 | End: 2023-08-23 | Stop reason: SDUPTHER

## 2023-08-23 RX ADMIN — SODIUM CHLORIDE, PRESERVATIVE FREE 10 ML: 5 INJECTION INTRAVENOUS at 20:07

## 2023-08-23 RX ADMIN — OXYCODONE AND ACETAMINOPHEN 2 TABLET: 325; 5 TABLET ORAL at 16:58

## 2023-08-23 RX ADMIN — SODIUM CHLORIDE, POTASSIUM CHLORIDE, SODIUM LACTATE AND CALCIUM CHLORIDE: 600; 310; 30; 20 INJECTION, SOLUTION INTRAVENOUS at 09:30

## 2023-08-23 RX ADMIN — SENNOSIDES AND DOCUSATE SODIUM 1 TABLET: 50; 8.6 TABLET ORAL at 12:07

## 2023-08-23 RX ADMIN — MORPHINE SULFATE 2 MG: 2 INJECTION, SOLUTION INTRAMUSCULAR; INTRAVENOUS at 14:42

## 2023-08-23 RX ADMIN — SODIUM CHLORIDE, POTASSIUM CHLORIDE, SODIUM LACTATE AND CALCIUM CHLORIDE: 600; 310; 30; 20 INJECTION, SOLUTION INTRAVENOUS at 06:44

## 2023-08-23 RX ADMIN — ONDANSETRON 4 MG: 2 INJECTION INTRAMUSCULAR; INTRAVENOUS at 09:24

## 2023-08-23 RX ADMIN — SODIUM CHLORIDE: 9 INJECTION, SOLUTION INTRAVENOUS at 19:59

## 2023-08-23 RX ADMIN — ROCURONIUM BROMIDE 50 MG: 10 INJECTION, SOLUTION INTRAVENOUS at 07:40

## 2023-08-23 RX ADMIN — MORPHINE SULFATE 2 MG: 2 INJECTION, SOLUTION INTRAMUSCULAR; INTRAVENOUS at 20:08

## 2023-08-23 RX ADMIN — FENTANYL CITRATE 50 MCG: 50 INJECTION INTRAMUSCULAR; INTRAVENOUS at 09:11

## 2023-08-23 RX ADMIN — OXYCODONE AND ACETAMINOPHEN 2 TABLET: 325; 5 TABLET ORAL at 22:35

## 2023-08-23 RX ADMIN — Medication 2000 MG: at 12:23

## 2023-08-23 RX ADMIN — DEXAMETHASONE SODIUM PHOSPHATE 10 MG: 10 INJECTION, SOLUTION INTRAMUSCULAR; INTRAVENOUS at 07:57

## 2023-08-23 RX ADMIN — Medication 25 MG: at 08:44

## 2023-08-23 RX ADMIN — OXYCODONE HYDROCHLORIDE 5 MG: 5 TABLET ORAL at 10:56

## 2023-08-23 RX ADMIN — ROCURONIUM BROMIDE 20 MG: 10 INJECTION, SOLUTION INTRAVENOUS at 08:27

## 2023-08-23 RX ADMIN — MIDAZOLAM 2 MG: 1 INJECTION INTRAMUSCULAR; INTRAVENOUS at 07:33

## 2023-08-23 RX ADMIN — SODIUM CHLORIDE: 9 INJECTION, SOLUTION INTRAVENOUS at 11:49

## 2023-08-23 RX ADMIN — PROPOFOL 200 MG: 10 INJECTION, EMULSION INTRAVENOUS at 07:40

## 2023-08-23 RX ADMIN — TOPIRAMATE 25 MG: 25 TABLET, FILM COATED ORAL at 20:04

## 2023-08-23 RX ADMIN — OXYCODONE AND ACETAMINOPHEN 1 TABLET: 325; 5 TABLET ORAL at 12:21

## 2023-08-23 RX ADMIN — FENTANYL CITRATE 50 MCG: 50 INJECTION, SOLUTION INTRAMUSCULAR; INTRAVENOUS at 10:45

## 2023-08-23 RX ADMIN — FENTANYL CITRATE 50 MCG: 50 INJECTION, SOLUTION INTRAMUSCULAR; INTRAVENOUS at 10:30

## 2023-08-23 RX ADMIN — FENTANYL CITRATE 50 MCG: 50 INJECTION, SOLUTION INTRAMUSCULAR; INTRAVENOUS at 10:18

## 2023-08-23 RX ADMIN — SUGAMMADEX 200 MG: 100 INJECTION, SOLUTION INTRAVENOUS at 09:24

## 2023-08-23 RX ADMIN — ROPINIROLE HYDROCHLORIDE 2 MG: 1 TABLET, FILM COATED ORAL at 20:04

## 2023-08-23 RX ADMIN — LIDOCAINE HYDROCHLORIDE 100 MG: 20 INJECTION, SOLUTION EPIDURAL; INFILTRATION; INTRACAUDAL; PERINEURAL at 07:40

## 2023-08-23 RX ADMIN — Medication 25 MG: at 08:09

## 2023-08-23 RX ADMIN — Medication 2000 MG: at 07:47

## 2023-08-23 RX ADMIN — FENTANYL CITRATE 100 MCG: 50 INJECTION INTRAMUSCULAR; INTRAVENOUS at 07:40

## 2023-08-23 RX ADMIN — DEXAMETHASONE SODIUM PHOSPHATE 6 MG: 10 INJECTION INTRAMUSCULAR; INTRAVENOUS at 20:03

## 2023-08-23 RX ADMIN — PHENOL 1 SPRAY: 1.5 LIQUID ORAL at 22:36

## 2023-08-23 RX ADMIN — Medication 2000 MG: at 20:00

## 2023-08-23 RX ADMIN — DEXAMETHASONE SODIUM PHOSPHATE 6 MG: 10 INJECTION INTRAMUSCULAR; INTRAVENOUS at 12:07

## 2023-08-23 RX ADMIN — FENTANYL CITRATE 50 MCG: 50 INJECTION, SOLUTION INTRAMUSCULAR; INTRAVENOUS at 10:09

## 2023-08-23 RX ADMIN — VENLAFAXINE HYDROCHLORIDE 37.5 MG: 37.5 TABLET ORAL at 22:36

## 2023-08-23 RX ADMIN — SENNOSIDES AND DOCUSATE SODIUM 1 TABLET: 50; 8.6 TABLET ORAL at 20:04

## 2023-08-23 ASSESSMENT — PAIN DESCRIPTION - ONSET: ONSET: SUDDEN

## 2023-08-23 ASSESSMENT — PAIN DESCRIPTION - LOCATION
LOCATION: NECK

## 2023-08-23 ASSESSMENT — PAIN SCALES - GENERAL
PAINLEVEL_OUTOF10: 10
PAINLEVEL_OUTOF10: 8
PAINLEVEL_OUTOF10: 9
PAINLEVEL_OUTOF10: 6
PAINLEVEL_OUTOF10: 9
PAINLEVEL_OUTOF10: 8
PAINLEVEL_OUTOF10: 7
PAINLEVEL_OUTOF10: 8
PAINLEVEL_OUTOF10: 9
PAINLEVEL_OUTOF10: 9
PAINLEVEL_OUTOF10: 7

## 2023-08-23 ASSESSMENT — PAIN DESCRIPTION - ORIENTATION
ORIENTATION: ANTERIOR

## 2023-08-23 ASSESSMENT — PAIN DESCRIPTION - DESCRIPTORS
DESCRIPTORS: ACHING
DESCRIPTORS: DULL

## 2023-08-23 ASSESSMENT — ENCOUNTER SYMPTOMS
COUGH: 0
NAUSEA: 0
RHINORRHEA: 0
WHEEZING: 0
BLOOD IN STOOL: 0
CHEST TIGHTNESS: 0
DIARRHEA: 0
ABDOMINAL PAIN: 0
SHORTNESS OF BREATH: 0
SHORTNESS OF BREATH: 0
VOMITING: 0
CONSTIPATION: 0

## 2023-08-23 ASSESSMENT — PAIN DESCRIPTION - PAIN TYPE: TYPE: SURGICAL PAIN

## 2023-08-23 ASSESSMENT — PAIN - FUNCTIONAL ASSESSMENT
PAIN_FUNCTIONAL_ASSESSMENT: ACTIVITIES ARE NOT PREVENTED
PAIN_FUNCTIONAL_ASSESSMENT: 0-10
PAIN_FUNCTIONAL_ASSESSMENT: ACTIVITIES ARE NOT PREVENTED

## 2023-08-23 ASSESSMENT — PAIN DESCRIPTION - FREQUENCY: FREQUENCY: CONTINUOUS

## 2023-08-23 NOTE — PROGRESS NOTES
Physical Therapy  DATE: 2023    NAME: Gregorio Winston  MRN: [id number]   : 1975    Patient not seen this date for Physical Therapy due to:      [] Cancel by RN or physician due to:    [] Hemodialysis    [] Critical Lab Value Level     [] Blood transfusion in progress    [] Acute or unstable cardiovascular status   _MAP < 55 or more than >115  _HR < 40 or > 130    [] Acute or unstable pulmonary status   -FiO2 > 60%   _RR < 5 or >40    _O2 sats < 85%    [] Strict Bedrest    [] Off Unit for surgery or procedure    [] Off Unit for testing       [] Pending imaging to R/O fracture    [x] Refusal by Patient, stated her Pain is not controlled     [] Other      [] PT being discontinued at this time. Patient independent. No further needs. [] PT being discontinued at this time as the patient has been transferred to hospice care. No further needs.       [address], PT

## 2023-08-23 NOTE — PLAN OF CARE
Problem: Discharge Planning  Goal: Discharge to home or other facility with appropriate resources  Outcome: Progressing  Flowsheets (Taken 8/23/2023 1145)  Discharge to home or other facility with appropriate resources:   Arrange for needed discharge resources and transportation as appropriate   Identify barriers to discharge with patient and caregiver   Identify discharge learning needs (meds, wound care, etc)   Refer to discharge planning if patient needs post-hospital services based on physician order or complex needs related to functional status, cognitive ability or social support system     Problem: Pain  Goal: Verbalizes/displays adequate comfort level or baseline comfort level  Outcome: Progressing

## 2023-08-23 NOTE — ANESTHESIA POSTPROCEDURE EVALUATION
Department of Anesthesiology  Postprocedure Note    Patient: Azalea Villaseñor  MRN: [id number]  YOB: 1975  Date of evaluation: 8/23/2023      Procedure Summary     Date: 08/23/23 Room / Location: [address] - INPATIENT    Anesthesia Start: 7586 Anesthesia Stop: 0945    Procedure: C5-6  CERVICAL DISCECTOMY FUSION ANTERIOR ONE LEVEL (Neck) Diagnosis:       Foraminal stenosis of cervical region      (Foraminal stenosis of cervical region [M48.02])    Surgeons: Ashley Emmanuel MD Responsible Provider: Imtiaz Boles MD    Anesthesia Type: general ASA Status: 3          Anesthesia Type: No value filed.     Dillan Phase I: Dillan Score: 9    Dillan Phase II:        Anesthesia Post Evaluation    Complications: no

## 2023-08-23 NOTE — OP NOTE
Operative Note      Patient: Linda Hawkins  YOB: 1975  MRN: [id number]    Date of Procedure: 8/23/2023    SURGEON:  Reyes Helms MD    ANESTHESIA:  General endotracheal anesthesia. PREOPERATIVE DIAGNOSIS:   C5-C6 cervical disc herniation with  radiculopathy. POSTOPERATIVE DIAGNOSIS:   C5-C6 cervical disc herniation with  radiculopathy. PROCEDURE:  1. C5-C6 anterior cervical diskectomy and fusion. 2. Insertion of interbody cage for spinal fusion at C5-C6. 3. Insertion of anterior cervical plate and screws at A4-O4      utilizing Medtronic Zevo plate and screws. 4. Rena Lara of local bone for spinal fusion. 5. Use of allograft bone for spinal fusion to include an      DBM  6. Intraoperative use of C-arm fluoroscopy. ESTIMATED BLOOD LOSS:  30 mL. FLUIDS:  Per anesthesia record. COMPLICATIONS:  None. INDICATIONS:  This is a pleasant 50-year-old female with a  longstanding history of significant neck pain radiating to her  left and right upper extremities. She had done extensive conservative  management to include physical therapy, medications, and pain  management all with minimal long-standing benefit. Due to her  continued symptoms and failure of conservative management, it was  discussed with her the option of performing a C5-C6 anterior  cervical diskectomy and fusion. After MRI did show significant  Disc herniation and spondylosis consistent with her symptoms at C5-C6. Risks and benefits were discussed including bleeding, infection,  injury to nerves, vessels, anesthetic risk, need for possible  further future surgery as well as the possibility for continued  pain, continued symptoms, possible nonunion. She did understand  all these risks, did wish to proceed. Informed consent was  obtained. PROCEDURE:  Patient was taken to the operating room and placed  supine on the operating table.   She was intubated and placed  under general anesthesia by anesthesiologist.  She

## 2023-08-23 NOTE — PROGRESS NOTES
Pt admitted to room 2023 per bed in drowsy condition from PACU  Oriented to room and surroundings  Bed in lowest position, wheels locked, 2/4 side rails up  Call light in reach, room free of clutter, adequate lighting provided  Denies any further questions at this time  Instructed to call out with any questions/concerns/new onset of pain and/or n/v   White board updated  Continue to monitor with hourly rounding  STAY WITH ME protocol initiated   Bed alarm on/Fall Risk signs in place/Fall risk sticker to wrist band  Non-skid socks on/at bedside  Pt refusing EPCs to be placed.

## 2023-08-23 NOTE — ANESTHESIA PRE PROCEDURE
Department of Anesthesiology  Preprocedure Note       Name:  Keron Curtis   Age:  53 y.o.  :  1975                                          MRN:  [id number]         Date:  2023      Surgeon: Lincoln Mcardle):  Hillary Brandt MD    Procedure: Procedure(s):  C5-6  CERVICAL DISCECTOMY FUSION ANTERIOR ONE LEVEL    Medications prior to admission:   Prior to Admission medications    Medication Sig Start Date End Date Taking? Authorizing Provider   amphetamine-dextroamphetamine (ADDERALL) 30 MG tablet Take 1 tablet by mouth daily. Historical Provider, MD   amphetamine-dextroamphetamine (ADDERALL) 10 MG tablet Take 1 tablet by mouth daily as needed. Historical Provider, MD   acetaminophen-codeine (TYLENOL #3) 300-30 MG per tablet Take 1 tablet by mouth every 4 hours as needed for Pain.     Historical Provider, MD   ibuprofen (ADVIL;MOTRIN) 800 MG tablet Take 1 tablet by mouth every 6 hours as needed for Pain    Historical Provider, MD   etodolac (LODINE) 400 MG tablet Take 1 tablet by mouth 2 times daily    Historical Provider, MD   Dihydroergotamine Mesylate HFA (TRUDHESA) 0.725 MG/ACT AERS by Nasal route    Historical Provider, MD   Ofatumumab (KESIMPTA) 20 MG/0.4ML SOAJ Inject 1 Dose into the skin every 30 days Second Tuesday of the month    Historical Provider, MD   venlafaxine (EFFEXOR) 37.5 MG tablet Take 1 tablet by mouth 2 times daily    Historical Provider, MD   Ubrogepant (UBRELVY) 100 MG TABS Take 100 mg by mouth as needed (migraine)    Historical Provider, MD   ondansetron (ZOFRAN-ODT) 4 MG disintegrating tablet Take 1 tablet by mouth daily as needed for Nausea or Vomiting    Historical Provider, MD   tiZANidine (ZANAFLEX) 4 MG tablet Take 1 tablet by mouth every 8 hours as needed (spasm) 21   Hillary Brandt MD   topiramate (TOPAMAX) 25 MG tablet Take 1 tablet by mouth 2 times daily    Historical Provider, MD   rOPINIRole (REQUIP) 2 MG tablet Take 1 tablet by mouth 2 times daily    Historical

## 2023-08-23 NOTE — CONSULTS
[facility]  Office: [id number]  1826, DO, Zaire Balderas, DO, Armando , DO, Jana Hallyves Bui, DO, Rabia Jones MD, Gisele Hernández MD, Faye Martínez MD, Vickie Guajardo MD,  Natali Mcdonald MD, Jerson Dinh MD, Zenaida Martinze, DO, Quinten Small MD,  Titus Robert DO, Rita Kerns MD, Seda Tineo MD, Kathie Gao DO, Rubin Bell MD,  Destini López DO, Mario Ortiz MD, Yoni Marin MD, Kera Dunham MD, Chantelle Vallejo MD,  Alpesh Baron MD, Reid Marino MD, Isabel Arriaga MD, Carlos Grayson DO, Luiz Upton MD,  Mario Castillo MD, Yao Neff, CNP,  Logan Govea, CNP, Davi Sims, CNP, Debbie Peterson, CNP,  Glo Chime, DNP, Franca Cruz, CNP, Radu Santa, CNP, Layla Taylor, CNP, Cong Mead, CNP, Brad Proctor, CNP, Nikos Gonzalez PA-C, Brian Rahman, CNS, Geri Pa, CNP, Silva Burns, [facility]      Consultation Note       Admit Date: 8/23/2023  Bed/Room No.  2023/2023-01  Admitting Physician : Todd Carrillo MD  Code Status :Full Code  Hospital Day:  LOS: 0 days   Patient is currently admitted for  treatment of  Herniation of cervical intervertebral disc with radiculopathy  Reason for Consult:  Medical Management   Requesting Physician: MD Carlton Champion MD    HISTORY OF PRESENT ILLNESS:   The patient is a 53 y.o. female who is admitted for surgical treatment of cervical stenosis and underwent anterior C5-C6 discectomy fusion. Patient was having cervical neck pain along with radiculopathy and numbness in the upper extremities. She also reported a fall about 4 months ago and evaluation found cervical stenosis. Patient had prior back surgery with T10-T11 posterior lateral fusion. Patient reports that she has failed ADL. She still works full-time HR . Patient has underlying history of hypothyroidism, multiple sclerosis, sleep apnea. Surgery was uneventful.

## 2023-08-23 NOTE — PROGRESS NOTES
Physical Therapy        Quick note - more detailed to follow. Pt evaluation completed 8/23. Pt functioning independently for bed mobility, transfers, and gait x 30 ft x 2 w/ no device. Reviewed brace / positioning / offloading UE's to decrease strain on neck. Reviewed safety w/ scanning environment due to loss of visual field with neck collar. Pt reporting no pain radiating, localized pain controlled. Toilet transfer independent. Pt able to tolerate up to chair x 9 minutes before she became dizzy; reports significant fatigue once she returned to supine. Pt positioned neutral alignment with good demonstration of mobility on same day. Will continue to progress while hospitalized. [facility].     Charline Mccallum, PT

## 2023-08-24 VITALS
DIASTOLIC BLOOD PRESSURE: 92 MMHG | RESPIRATION RATE: 17 BRPM | WEIGHT: 256.1 LBS | SYSTOLIC BLOOD PRESSURE: 130 MMHG | HEIGHT: 67 IN | HEART RATE: 91 BPM | TEMPERATURE: 97.5 F | OXYGEN SATURATION: 97 % | BODY MASS INDEX: 40.19 KG/M2

## 2023-08-24 LAB
ANION GAP SERPL CALCULATED.3IONS-SCNC: 12 MMOL/L (ref 9–17)
BASOPHILS # BLD: 0 K/UL (ref 0–0.2)
BASOPHILS NFR BLD: 0 % (ref 0–2)
BUN SERPL-MCNC: 7 MG/DL (ref 6–20)
BUN/CREAT SERPL: 12 (ref 9–20)
CALCIUM SERPL-MCNC: 9.1 MG/DL (ref 8.6–10.4)
CHLORIDE SERPL-SCNC: 104 MMOL/L (ref 98–107)
CO2 SERPL-SCNC: 21 MMOL/L (ref 20–31)
CREAT SERPL-MCNC: 0.6 MG/DL (ref 0.5–0.9)
EOSINOPHIL # BLD: 0 K/UL (ref 0–0.44)
EOSINOPHILS RELATIVE PERCENT: 0 % (ref 1–4)
ERYTHROCYTE [DISTWIDTH] IN BLOOD BY AUTOMATED COUNT: 13.7 % (ref 11.8–14.4)
GFR SERPL CREATININE-BSD FRML MDRD: >60 ML/MIN/1.73M2
GLUCOSE SERPL-MCNC: 169 MG/DL (ref 70–99)
HCT VFR BLD AUTO: 47.6 % (ref 36.3–47.1)
HGB BLD-MCNC: 15.7 G/DL (ref 11.9–15.1)
IMM GRANULOCYTES # BLD AUTO: 0.26 K/UL (ref 0–0.3)
IMM GRANULOCYTES NFR BLD: 1 %
LYMPHOCYTES NFR BLD: 0.51 K/UL (ref 1.1–3.7)
LYMPHOCYTES RELATIVE PERCENT: 2 % (ref 24–43)
MCH RBC QN AUTO: 31.5 PG (ref 25.2–33.5)
MCHC RBC AUTO-ENTMCNC: 33 G/DL (ref 28.4–34.8)
MCV RBC AUTO: 95.6 FL (ref 82.6–102.9)
MONOCYTES NFR BLD: 0.77 K/UL (ref 0.1–1.2)
MONOCYTES NFR BLD: 3 % (ref 3–12)
MRSA, DNA, NASAL: NEGATIVE
NEUTROPHILS NFR BLD: 94 % (ref 36–65)
NEUTS SEG NFR BLD: 24.16 K/UL (ref 1.5–8.1)
NRBC BLD-RTO: 0 PER 100 WBC
PLATELET # BLD AUTO: 267 K/UL (ref 138–453)
PMV BLD AUTO: 9.9 FL (ref 8.1–13.5)
POTASSIUM SERPL-SCNC: 4.6 MMOL/L (ref 3.7–5.3)
RBC # BLD AUTO: 4.98 M/UL (ref 3.95–5.11)
SODIUM SERPL-SCNC: 137 MMOL/L (ref 135–144)
SPECIMEN DESCRIPTION: NORMAL
WBC OTHER # BLD: 25.7 K/UL (ref 3.5–11.3)

## 2023-08-24 PROCEDURE — 97530 THERAPEUTIC ACTIVITIES: CPT

## 2023-08-24 PROCEDURE — 2580000003 HC RX 258: Performed by: ORTHOPAEDIC SURGERY

## 2023-08-24 PROCEDURE — 36415 COLL VENOUS BLD VENIPUNCTURE: CPT

## 2023-08-24 PROCEDURE — 6360000002 HC RX W HCPCS: Performed by: ORTHOPAEDIC SURGERY

## 2023-08-24 PROCEDURE — 99231 SBSQ HOSP IP/OBS SF/LOW 25: CPT | Performed by: FAMILY MEDICINE

## 2023-08-24 PROCEDURE — 6370000000 HC RX 637 (ALT 250 FOR IP): Performed by: ORTHOPAEDIC SURGERY

## 2023-08-24 PROCEDURE — 85025 COMPLETE CBC W/AUTO DIFF WBC: CPT

## 2023-08-24 PROCEDURE — 80048 BASIC METABOLIC PNL TOTAL CA: CPT

## 2023-08-24 RX ORDER — TIZANIDINE 4 MG/1
4 TABLET ORAL EVERY 8 HOURS PRN
Qty: 50 TABLET | Refills: 0 | Status: SHIPPED | OUTPATIENT
Start: 2023-08-24

## 2023-08-24 RX ORDER — OXYCODONE HYDROCHLORIDE AND ACETAMINOPHEN 5; 325 MG/1; MG/1
1-2 TABLET ORAL EVERY 4 HOURS PRN
Qty: 60 TABLET | Refills: 0 | Status: SHIPPED | OUTPATIENT
Start: 2023-08-24 | End: 2023-08-31

## 2023-08-24 RX ADMIN — VENLAFAXINE HYDROCHLORIDE 37.5 MG: 37.5 TABLET ORAL at 09:27

## 2023-08-24 RX ADMIN — ROPINIROLE HYDROCHLORIDE 2 MG: 1 TABLET, FILM COATED ORAL at 09:27

## 2023-08-24 RX ADMIN — TOPIRAMATE 25 MG: 25 TABLET, FILM COATED ORAL at 09:27

## 2023-08-24 RX ADMIN — OXYCODONE AND ACETAMINOPHEN 2 TABLET: 325; 5 TABLET ORAL at 04:07

## 2023-08-24 RX ADMIN — OXYCODONE AND ACETAMINOPHEN 2 TABLET: 325; 5 TABLET ORAL at 08:37

## 2023-08-24 RX ADMIN — SENNOSIDES AND DOCUSATE SODIUM 1 TABLET: 50; 8.6 TABLET ORAL at 09:27

## 2023-08-24 RX ADMIN — DEXAMETHASONE SODIUM PHOSPHATE 6 MG: 10 INJECTION INTRAMUSCULAR; INTRAVENOUS at 04:07

## 2023-08-24 RX ADMIN — MORPHINE SULFATE 2 MG: 2 INJECTION, SOLUTION INTRAMUSCULAR; INTRAVENOUS at 02:19

## 2023-08-24 RX ADMIN — SODIUM CHLORIDE: 9 INJECTION, SOLUTION INTRAVENOUS at 04:14

## 2023-08-24 ASSESSMENT — ENCOUNTER SYMPTOMS
NAUSEA: 0
WHEEZING: 0
COUGH: 0
CONSTIPATION: 0
BLOOD IN STOOL: 0
DIARRHEA: 0
ABDOMINAL PAIN: 0
SHORTNESS OF BREATH: 0
VOMITING: 0
CHEST TIGHTNESS: 0
RHINORRHEA: 0

## 2023-08-24 ASSESSMENT — PAIN DESCRIPTION - ORIENTATION
ORIENTATION: ANTERIOR

## 2023-08-24 ASSESSMENT — PAIN DESCRIPTION - DESCRIPTORS
DESCRIPTORS: ACHING

## 2023-08-24 ASSESSMENT — PAIN DESCRIPTION - LOCATION
LOCATION: NECK
LOCATION: NECK;THROAT

## 2023-08-24 ASSESSMENT — PAIN SCALES - GENERAL
PAINLEVEL_OUTOF10: 6
PAINLEVEL_OUTOF10: 6
PAINLEVEL_OUTOF10: 4
PAINLEVEL_OUTOF10: 8

## 2023-08-24 ASSESSMENT — PAIN - FUNCTIONAL ASSESSMENT: PAIN_FUNCTIONAL_ASSESSMENT: ACTIVITIES ARE NOT PREVENTED

## 2023-08-24 ASSESSMENT — PAIN DESCRIPTION - FREQUENCY: FREQUENCY: INTERMITTENT

## 2023-08-24 NOTE — PLAN OF CARE
Problem: Discharge Planning  Goal: Discharge to home or other facility with appropriate resources  8/24/2023 0117 by Aishwarya Bowden RN  Outcome: Progressing     Problem: Pain  Goal: Verbalizes/displays adequate comfort level or baseline comfort level  8/24/2023 0117 by Aishwarya Bowden RN  Outcome: Progressing     Problem: Skin/Tissue Integrity - Adult  Goal: Skin integrity remains intact  Outcome: Progressing

## 2023-08-24 NOTE — PROGRESS NOTES
CLINICAL PHARMACY NOTE: MEDS TO BEDS    Total # of Prescriptions Filled: 2   The following medications were delivered to the patient:  Percocet 5-325  Tizanidine 4mg    Additional Documentation:

## 2023-08-24 NOTE — PROGRESS NOTES
[facility]  Office: [phone]  Cinthya Abdullahi, DO, Orion Alcaraz, DO, Maren Peterson, DO, Conner Bui, DO, Sang Taylor MD, Cyndy See MD, Paola Glover MD, Ebbie Meigs, MD,  Terrell Florian MD, Henri Urrutia MD, Deonte Martinez, DO, Seema Mcdonald MD,  Logan Dunlap MD, Yao Burgess MD, Lois Davis DO, Blanche Mackay MD,  Janny Ledezma DO, Verner Crandall, MD, Martha Chong MD, Helena Baig MD, Linda Trejo MD,  Navin Cordero MD, Magalis Hernandes MD, Urszula Chapman MD, Evelyn Watson DO, Chiquita Gillette MD,  Bubba Alexander MD, Frida Carreon, Dicie Meckel, CNP, Samson Lynn, CNP, Vasyl Goldstein, CNP,  Suellen Lees, [facility], Carlos A Treviño, CNP, Lisa Hare, CNP, Rosangela Hall, CNP, Marly Faye, CNP, Zakiya Mabry, CNP, Rachael Barney PA-C, Siva Brock, RADHA, Kourtney Yoon, CNP, Richie Rosas, [address] [place] [address]      Daily Progress Note     Admit Date: 8/23/2023  Bed/Room No.  2023/2023-01  Admitting Physician : Mikel Mendoza MD  Code Status :[facility] Day:  LOS: 0 days   Chief Complaint:     No chief complaint on file. Principal Problem:    Herniation of cervical intervertebral disc with radiculopathy  Resolved Problems:    * No resolved hospital problems. *    Subjective : Interval History/Significant events :  08/24/23    Patient reports neck pain is controlled. She has no new complaints. Patient eating and drinking well. She has been ambulatory. No overnight changes. Denies any numbness, tingling. Strength in upper extremities is improved. Vitals - Stable afebrile  Labs - leucocytosis. Nursing notes , Consults notes reviewed. Overnight events and updates discussed with Nursing staff . Background History:         Sandrita Gupta is 53 y.o. female  Who was admitted to the hospital on 8/23/2023 for treatment of Herniation of cervical intervertebral disc with radiculopathy.

## 2023-08-24 NOTE — PROGRESS NOTES
[address]  NOTE    Room # 2023/2023-01   Name: He Yu            Restoration: [address] [place] [address]     Reason for visit: Routine    I visited the patient. Admit Date & Time: 8/23/2023  6:04 AM    Assessment:  He Yu is a 53 y.o. female in the hospital because she has had procedure on herniated disk. Upon entering the room the patient is found sitting up in chair next to bed. She is awake, alert and wearing neck brace. Intervention:  I introduced myself and my title as  I offered space for patient  to express feelings, needs, and concerns and provided a ministry presence. Patient is hopeful that she can return home soon. Outcome:  Patient calm and coping. Plan:  Chaplains will remain available to offer spiritual and emotional support as needed. Electronically signed by Juliana Delvalle on 8/23/2023 at 8:08 PM.  [address]      08/23/23 2007   Encounter Summary   Service Provided For: Patient   Referral/Consult From: [facility]   Support System Unknown   Last Encounter  08/23/23   Complexity of Encounter Low   Begin Time 1610   End Time  1630   Total Time Calculated 20 min   Spiritual/Emotional needs   Type Spiritual Support   Assessment/Intervention/Outcome   Assessment Calm;Coping; Hopeful   Intervention Active listening;Explored/Affirmed feelings, thoughts, concerns;Nurtured Hope;Sustaining Presence/Ministry of presence   Outcome Comfort; Connection/Belonging;Expressed feelings, needs, and concerns

## 2023-08-24 NOTE — PROGRESS NOTES
Physical Therapy  Facility/Department: [facility]  Physical Therapy Treatment    Name: Gregorio Winston  : 1975  MRN: [id number]  Date of Service: 2023    Discharge Recommendations:  No therapy recommended at discharge          Patient Diagnosis(es): The encounter diagnosis was Herniation of cervical intervertebral disc with radiculopathy. Past Medical History:  has a past medical history of Balance problem, COVID-19 virus infection, Dizziness, History of depression, Hypothyroidism, Migraines, Multiple sclerosis ([address]), OCD (obsessive compulsive disorder), Restless leg syndrome, Sleep apnea, and Under care of team.  Past Surgical History:  has a past surgical history that includes Hysterectomy; Cholecystectomy; Varicose vein surgery (); Carpal tunnel release (Left, 10/16/2014); Carpal tunnel release (Right, 10/30/2014); Thoracic spine surgery; back surgery (2021); laminectomy (N/A, 2021); Endoscopy, colon, diagnostic; and cervical fusion (N/A, 2023). Assessment     Assessment: Pt presents pod #1 will continued safe mobiltiy and controlled pain. No further therapy needs identified. Would expect a safe d/c home today.  - RN aware.     Therapy Prognosis: Good  Activity Tolerance  Activity Tolerance: Patient tolerated evaluation without incident     Plan   Physcial Therapy Plan  General Plan: Discharge     Restrictions  Restrictions/Precautions  Restrictions/Precautions: General Precautions, Fall Risk  Required Braces or Orthoses?: Yes  Required Braces or Orthoses  Cervical: c-collar  Position Activity Restriction  Spinal Precautions: No Bending, No Lifting, No Twisting  Other position/activity restrictions: ambulate, activity as tolerated, pt to wear cervical collar, dysphagia precautions(SOFT AND BITE SIZED), hand IV     Subjective   General  Chart Reviewed: Yes  Patient assessed for rehabilitation services?: Yes  Response To Previous Treatment: Not applicable  Family /

## 2023-08-24 NOTE — PROGRESS NOTES
Pt discharged to home in good condition with belongings  Discharge instructions given  \"Meds To Beds\" medication at bedside  Pt denies having any further questions at this time  personal items given to patient at discharge  Patient/family state they have everything they were admitted with.   Dressing change supplies and hibiclens sent home with patient

## 2023-08-24 NOTE — PROGRESS NOTES
Physical Therapy  Facility/Department: [facility]  Physical Therapy Initial Assessment    Name: Addy Yoder  : 1975  MRN: [id number]  Date of Service: 2023    Discharge Recommendations:  No therapy recommended at discharge          Patient Diagnosis(es): The encounter diagnosis was Herniation of cervical intervertebral disc with radiculopathy. Past Medical History:  has a past medical history of Balance problem, COVID-19 virus infection, Dizziness, History of depression, Hypothyroidism, Migraines, Multiple sclerosis ([address]), OCD (obsessive compulsive disorder), Restless leg syndrome, Sleep apnea, and Under care of team.  Past Surgical History:  has a past surgical history that includes Hysterectomy; Cholecystectomy; Varicose vein surgery (); Carpal tunnel release (Left, 10/16/2014); Carpal tunnel release (Right, 10/30/2014); Thoracic spine surgery; back surgery (2021); laminectomy (N/A, 2021); Endoscopy, colon, diagnostic; and cervical fusion (N/A, 2023). Assessment   Assessment: Pt presents same day s/p Cx fusion with good tolerance to OOB same day. Pt engaged and highly motivated. Pt demonstrating equal strength krystal UE and has no complaints this date. Pain controlled. Pt did become exhausted with up to chair - will see for one more visit for community distance walking and review of education.     Therapy Prognosis: Good  Decision Making: Low Complexity  Clinical Presentation: stable  Activity Tolerance  Activity Tolerance: Patient tolerated evaluation without incident     Plan   Physcial Therapy Plan  General Plan: 1 time a day 7 days a week  Safety Devices  Type of Devices: Left in bed, Nurse notified     Restrictions  Restrictions/Precautions  Restrictions/Precautions: General Precautions, Fall Risk  Required Braces or Orthoses?: Yes  Required Braces or Orthoses  Cervical: c-collar  Position Activity Restriction  Spinal Precautions: No Bending, No Lifting, No

## (undated) DEVICE — RETRACTOR SURG MAS TLIF HOOP SHIM DISP MAXCESS

## (undated) DEVICE — SUTURE MCRYL SZ 4-0 L27IN ABSRB UD L19MM PS-2 1/2 CIR PRIM Y426H

## (undated) DEVICE — TOTAL TRAY, DB, 100% SILI FOLEY, 16FR 10: Brand: MEDLINE

## (undated) DEVICE — GOWN,AURORA,NONRNF,XL,30/CS: Brand: MEDLINE

## (undated) DEVICE — ELECTRODE PT RET AD L9FT HI MOIST COND ADH HYDRGEL CORDED

## (undated) DEVICE — TUBING, SUCTION, 1/4" X 12', STRAIGHT: Brand: MEDLINE

## (undated) DEVICE — NEEDLE NRV STIM BVL TIP INSUL PEDCL ACCS SYS FOR EMG MON

## (undated) DEVICE — DRESSING PETRO W3XL8IN OIL EMUL N ADH GZ KNIT IMPREG CELOS

## (undated) DEVICE — GLOVE SURG SZ 85 CRM LTX FREE POLYISOPRENE POLYMER BEAD ANTI

## (undated) DEVICE — CORD,CAUTERY,BIPOLAR,STERILE: Brand: MEDLINE

## (undated) DEVICE — 4.0MM PRECISION ROUND

## (undated) DEVICE — RESERVOIR,SUCTION,100CC,SILICONE: Brand: MEDLINE

## (undated) DEVICE — YANKAUER,FLEXIBLE HANDLE,REGLR CAPACITY: Brand: MEDLINE INDUSTRIES, INC.

## (undated) DEVICE — ULTRACLEAN ACCESSORY ELECTRODE, 1 INCH COATED NEEDLE WITH EXTENDED INSULATION: Brand: ULTRACLEAN

## (undated) DEVICE — GAUZE, BORDER, 3"X6", 1.5"X4"PAD, STERIL: Brand: MEDLINE INDUSTRIES, INC.

## (undated) DEVICE — SUTURE VCRL SZ 0 L36IN ABSRB UD L36MM CT-1 1/2 CIR J946H

## (undated) DEVICE — HYPODERMIC SAFETY NEEDLE: Brand: MAGELLAN

## (undated) DEVICE — DRAIN SURG FLAT W7MMXL20CM FULL PERF

## (undated) DEVICE — SUTURE VCRL SZ 2-0 L36IN ABSRB UD L36MM CT-1 1/2 CIR J945H

## (undated) DEVICE — AGENT HEMSTAT 8ML FLX TIP MTRX + DISP SURGIFLO

## (undated) DEVICE — LIQUIBAND RAPID ADHESIVE 36/CS 0.8ML: Brand: MEDLINE

## (undated) DEVICE — Device

## (undated) DEVICE — C-ARM: Brand: UNBRANDED

## (undated) DEVICE — SPONGE,PEANUT,XRAY,ST,SM,3/8",5/CARD: Brand: MEDLINE INDUSTRIES, INC.

## (undated) DEVICE — K WIRE FIX NIT BVL BLNT TIP PRECEPT
Type: IMPLANTABLE DEVICE | Site: BACK | Status: NON-FUNCTIONAL
Removed: 2021-08-23

## (undated) DEVICE — 1010 S-DRAPE TOWEL DRAPE 10/BX: Brand: STERI-DRAPE™

## (undated) DEVICE — APPLICATOR MEDICATED 26 CC SOLUTION HI LT ORNG CHLORAPREP

## (undated) DEVICE — BLANKET WRM W29.9XL79.1IN UP BODY FORC AIR MISTRAL-AIR

## (undated) DEVICE — BLANKET WRM W40.2XL55.9IN IORT LO BODY + MISTRAL AIR

## (undated) DEVICE — SUTURE VCRL SZ 3-0 L27IN ABSRB UD L26MM SH 1/2 CIR J416H

## (undated) DEVICE — TOWEL,OR,DSP,ST,BLUE,DLX,XR,4/PK,20PK/CS: Brand: MEDLINE

## (undated) DEVICE — GLOVE SURG SZ 85 L12IN FNGR THK79MIL GRN LTX FREE

## (undated) DEVICE — COVER,TABLE,HEAVY DUTY,50"X90",STRL: Brand: MEDLINE

## (undated) DEVICE — PROTECTOR EYE PT SELF ADH NS OPT GRD LF

## (undated) DEVICE — DRILL BIT 7080510 11 MM DRILL BIT S

## (undated) DEVICE — INSERT CUSHION HEAD PRONEVIEW

## (undated) DEVICE — DRAPE,REIN 53X77,STERILE: Brand: MEDLINE

## (undated) DEVICE — ABS MED DISTRACTION PIN, 14MM PATIENT (INNER): Brand: ABS MED DISTRACTION PIN

## (undated) DEVICE — APPLICATOR MEDICATED 10.5 CC SOLUTION HI LT ORNG CHLORAPREP

## (undated) DEVICE — DRESSING ADH N ADH 8X35 IN 6X175 IN SFT CLTH MEDIPORE +

## (undated) DEVICE — SPONGE LAP W18XL18IN WHT COT 4 PLY FLD STRUNG RADPQ DISP ST

## (undated) DEVICE — INTENDED FOR TISSUE SEPARATION, AND OTHER PROCEDURES THAT REQUIRE A SHARP SURGICAL BLADE TO PUNCTURE OR CUT.: Brand: BARD-PARKER ® CARBON RIB-BACK BLADES

## (undated) DEVICE — ADHESIVE SKIN CLSR 0.7ML TOP DERMBND ADV

## (undated) DEVICE — CODMAN® SURGICAL PATTIES 1" X 1" (2.54CM X 2.54CM): Brand: CODMAN®

## (undated) DEVICE — SPONGE,NEURO,1"X1",XR,STRL,LF,10/PK: Brand: MEDLINE